# Patient Record
Sex: FEMALE | Race: WHITE | Employment: UNEMPLOYED | URBAN - NONMETROPOLITAN AREA
[De-identification: names, ages, dates, MRNs, and addresses within clinical notes are randomized per-mention and may not be internally consistent; named-entity substitution may affect disease eponyms.]

---

## 2017-11-16 DIAGNOSIS — R51.9 CHRONIC NONINTRACTABLE HEADACHE, UNSPECIFIED HEADACHE TYPE: ICD-10-CM

## 2017-11-16 DIAGNOSIS — G89.29 CHRONIC NONINTRACTABLE HEADACHE, UNSPECIFIED HEADACHE TYPE: ICD-10-CM

## 2017-11-16 NOTE — TELEPHONE ENCOUNTER
From: Susan Ellis  Sent: 11/16/2017 2:30 PM EST  Subject: Medication Renewal Request    Susan Ellis would like a refill of the following medications:  butalbital-acetaminophen-caffeine (FIORICET, ESGIC) -40 MG per tablet Yao Martinez NP]  meloxicam (MOBIC) 15 MG tablet Yao Martinez NP]    Preferred pharmacy: [facility] [address],  Emili Botello [id number] - F [phone]    Comment:

## 2017-11-20 RX ORDER — BUTALBITAL, ACETAMINOPHEN AND CAFFEINE 50; 325; 40 MG/1; MG/1; MG/1
1 TABLET ORAL EVERY 6 HOURS PRN
Qty: 30 TABLET | Refills: 3 | Status: SHIPPED | OUTPATIENT
Start: 2017-11-20 | End: 2017-11-22 | Stop reason: SDUPTHER

## 2017-11-20 RX ORDER — MELOXICAM 15 MG/1
15 TABLET ORAL DAILY
Qty: 30 TABLET | Refills: 3 | Status: SHIPPED | OUTPATIENT
Start: 2017-11-20 | End: 2017-11-22 | Stop reason: SDUPTHER

## 2017-11-22 ENCOUNTER — OFFICE VISIT (OUTPATIENT)
Dept: FAMILY MEDICINE CLINIC | Age: 62
End: 2017-11-22

## 2017-11-22 VITALS
SYSTOLIC BLOOD PRESSURE: 112 MMHG | HEART RATE: 72 BPM | DIASTOLIC BLOOD PRESSURE: 72 MMHG | HEIGHT: 65 IN | BODY MASS INDEX: 22.06 KG/M2 | WEIGHT: 132.4 LBS | TEMPERATURE: 97.5 F | OXYGEN SATURATION: 99 %

## 2017-11-22 DIAGNOSIS — G89.29 CHRONIC NONINTRACTABLE HEADACHE, UNSPECIFIED HEADACHE TYPE: Primary | ICD-10-CM

## 2017-11-22 DIAGNOSIS — Z12.39 BREAST CANCER SCREENING: ICD-10-CM

## 2017-11-22 DIAGNOSIS — Z12.11 COLON CANCER SCREENING: ICD-10-CM

## 2017-11-22 DIAGNOSIS — R51.9 CHRONIC NONINTRACTABLE HEADACHE, UNSPECIFIED HEADACHE TYPE: Primary | ICD-10-CM

## 2017-11-22 PROCEDURE — 99213 OFFICE O/P EST LOW 20 MIN: CPT | Performed by: NURSE PRACTITIONER

## 2017-11-22 RX ORDER — BUTALBITAL, ACETAMINOPHEN AND CAFFEINE 50; 325; 40 MG/1; MG/1; MG/1
1 TABLET ORAL EVERY 6 HOURS PRN
Qty: 30 TABLET | Refills: 3 | Status: SHIPPED | OUTPATIENT
Start: 2017-11-22 | End: 2019-05-02 | Stop reason: SDUPTHER

## 2017-11-22 RX ORDER — MELOXICAM 15 MG/1
15 TABLET ORAL DAILY
Qty: 30 TABLET | Refills: 3 | Status: SHIPPED | OUTPATIENT
Start: 2017-11-22 | End: 2021-10-21 | Stop reason: CLARIF

## 2017-11-22 ASSESSMENT — ENCOUNTER SYMPTOMS: SHORTNESS OF BREATH: 0

## 2017-11-22 NOTE — PROGRESS NOTES
Subjective  Chief Complaint   Patient presents with    Annual Exam     LOV 4/2016    Headache    Health Maintenance     mammogram and flu declined. HPI   Pt here for her annual exam    Still gets headaches from time to time  Current medication regimen is working well  Has jaw pain, feels like it's stiff and pops  Her dentist is aware but her dental insurance doesn't cover TMJ    There are no active problems to display for this patient. Past Medical History:   Diagnosis Date    Osteoarthritis     neck     Past Surgical History:   Procedure Laterality Date    BREAST CYST EXCISION      in pt's 20's     Family History   Problem Relation Age of Onset    Diabetes Father      Social History     Social History    Marital status:      Spouse name: N/A    Number of children: N/A    Years of education: N/A     Social History Main Topics    Smoking status: Never Smoker    Smokeless tobacco: Never Used    Alcohol use No    Drug use: No    Sexual activity: Not Asked     Other Topics Concern    None     Social History Narrative    None     Current Outpatient Prescriptions on File Prior to Visit   Medication Sig Dispense Refill    cetirizine (ZYRTEC ALLERGY) 10 MG tablet Take 1 tablet by mouth daily 30 tablet 3    fluticasone (FLONASE) 50 MCG/ACT nasal spray 1 spray by Nasal route daily 1 Bottle 3    olopatadine (PATADAY) 0.2 % SOLN ophthalmic solution 1 drop as needed       No current facility-administered medications on file prior to visit. Allergies   Allergen Reactions    Pcn [Penicillins] Swelling and Rash       Review of Systems   Constitutional: Negative for activity change, fatigue and fever. Respiratory: Negative for shortness of breath. Cardiovascular: Negative for chest pain and leg swelling. Neurological: Positive for headaches. Negative for dizziness, syncope and light-headedness.        Objective  Vitals:    11/22/17 1254   BP: 112/72   Site: Left Arm   Position: Sitting rationale and result expectations have been discussed with the patient who expresses understanding and desires to proceed. Close follow up to evaluate treatment results and for coordination of care. I have reviewed the patient's medical history in detail and updated the computerized patient record. As always, patient is advised that if symptoms worsen in any way they will proceed to the nearest emergency room. Return in about 1 year (around 11/22/2018).     Margot Perez NP

## 2017-11-29 ENCOUNTER — HOSPITAL ENCOUNTER (OUTPATIENT)
Dept: WOMENS IMAGING | Age: 62
Discharge: HOME | End: 2017-11-29
Payer: COMMERCIAL

## 2017-11-29 DIAGNOSIS — Z12.39 BREAST CANCER SCREENING: ICD-10-CM

## 2017-11-29 PROCEDURE — 77063 BREAST TOMOSYNTHESIS BI: CPT

## 2017-12-11 DIAGNOSIS — Z12.11 COLON CANCER SCREENING: ICD-10-CM

## 2017-12-11 LAB — HEMOCCULT STL QL: NORMAL

## 2019-05-02 ENCOUNTER — OFFICE VISIT (OUTPATIENT)
Dept: FAMILY MEDICINE CLINIC | Age: 64
End: 2019-05-02
Payer: COMMERCIAL

## 2019-05-02 VITALS
HEART RATE: 76 BPM | SYSTOLIC BLOOD PRESSURE: 104 MMHG | OXYGEN SATURATION: 98 % | DIASTOLIC BLOOD PRESSURE: 60 MMHG | BODY MASS INDEX: 22.73 KG/M2 | HEIGHT: 65 IN | TEMPERATURE: 96.8 F | WEIGHT: 136.4 LBS

## 2019-05-02 DIAGNOSIS — Z13.220 LIPID SCREENING: ICD-10-CM

## 2019-05-02 DIAGNOSIS — Z12.11 COLON CANCER SCREENING: ICD-10-CM

## 2019-05-02 DIAGNOSIS — R51.9 CHRONIC NONINTRACTABLE HEADACHE, UNSPECIFIED HEADACHE TYPE: Primary | ICD-10-CM

## 2019-05-02 DIAGNOSIS — G89.29 CHRONIC NONINTRACTABLE HEADACHE, UNSPECIFIED HEADACHE TYPE: Primary | ICD-10-CM

## 2019-05-02 LAB
CHOLESTEROL, TOTAL: 236 MG/DL (ref 0–199)
HDLC SERPL-MCNC: 59 MG/DL (ref 40–59)
LDL CHOLESTEROL CALCULATED: 152 MG/DL (ref 0–129)
TRIGL SERPL-MCNC: 126 MG/DL (ref 0–150)

## 2019-05-02 PROCEDURE — 99213 OFFICE O/P EST LOW 20 MIN: CPT | Performed by: NURSE PRACTITIONER

## 2019-05-02 RX ORDER — MELOXICAM 15 MG/1
15 TABLET ORAL DAILY
Qty: 30 TABLET | Refills: 3 | Status: CANCELLED | OUTPATIENT
Start: 2019-05-02

## 2019-05-02 RX ORDER — CYCLOBENZAPRINE HCL 5 MG
5 TABLET ORAL NIGHTLY PRN
Qty: 30 TABLET | Refills: 1 | Status: SHIPPED | OUTPATIENT
Start: 2019-05-02 | End: 2019-05-12

## 2019-05-02 RX ORDER — BUTALBITAL, ACETAMINOPHEN AND CAFFEINE 50; 325; 40 MG/1; MG/1; MG/1
1 TABLET ORAL EVERY 6 HOURS PRN
Qty: 30 TABLET | Refills: 3 | Status: SHIPPED | OUTPATIENT
Start: 2019-05-02 | End: 2020-10-22 | Stop reason: SDUPTHER

## 2019-05-02 RX ORDER — CELECOXIB 200 MG/1
200 CAPSULE ORAL DAILY
Qty: 30 CAPSULE | Refills: 3 | Status: SHIPPED | OUTPATIENT
Start: 2019-05-02 | End: 2020-10-22 | Stop reason: SDUPTHER

## 2019-05-02 ASSESSMENT — PATIENT HEALTH QUESTIONNAIRE - PHQ9
1. LITTLE INTEREST OR PLEASURE IN DOING THINGS: 0
2. FEELING DOWN, DEPRESSED OR HOPELESS: 0
SUM OF ALL RESPONSES TO PHQ QUESTIONS 1-9: 0
SUM OF ALL RESPONSES TO PHQ9 QUESTIONS 1 & 2: 0
SUM OF ALL RESPONSES TO PHQ QUESTIONS 1-9: 0

## 2019-05-02 ASSESSMENT — ENCOUNTER SYMPTOMS
BLURRED VISION: 0
PHOTOPHOBIA: 1
VOMITING: 0
NAUSEA: 1

## 2019-05-02 NOTE — PROGRESS NOTES
None     Attends [employer]an service: None     Active member of club or organization: None     Attends meetings of clubs or organizations: None     Relationship status: None    Intimate partner violence:     Fear of current or ex partner: None     Emotionally abused: None     Physically abused: None     Forced sexual activity: None   Other Topics Concern    None   Social History Narrative    None     Current Outpatient Medications on File Prior to Visit   Medication Sig Dispense Refill    Tetrahydrozoline-Zn Sulfate (EYE DROPS ALLERGY RELIEF OP) Apply to eye as needed      meloxicam (MOBIC) 15 MG tablet Take 1 tablet by mouth daily 30 tablet 3    cetirizine (ZYRTEC ALLERGY) 10 MG tablet Take 1 tablet by mouth daily 30 tablet 3    fluticasone (FLONASE) 50 MCG/ACT nasal spray 1 spray by Nasal route daily 1 Bottle 3     No current facility-administered medications on file prior to visit. Allergies   Allergen Reactions    Pcn [Penicillins] Swelling and Rash       Review of Systems   Eyes: Positive for photophobia. Negative for blurred vision and visual disturbance. Gastrointestinal: Positive for nausea. Negative for vomiting. Neurological: Positive for headaches. Negative for light-headedness. Objective  Vitals:    05/02/19 0918   BP: 104/60   Pulse: 76   Temp: 96.8 °F (36 °C)   SpO2: 98%   Weight: 136 lb 6.4 oz (61.9 kg)   Height: 5' 5\" (1.651 m)     Physical Exam   Constitutional: She is oriented to person, place, and time. She appears well-developed and well-nourished. HENT:   Head: Normocephalic. Eyes: Pupils are equal, round, and reactive to light. Conjunctivae and EOM are normal.   Neck: Neck supple. No thyromegaly present. Cardiovascular: Normal rate, regular rhythm, normal heart sounds and intact distal pulses. Pulmonary/Chest: Effort normal and breath sounds normal.   Lymphadenopathy:     She has no cervical adenopathy.    Neurological: She is alert and oriented to person, place, and time.   Psychiatric: She has a normal mood and affect. Her behavior is normal. Judgment and thought content normal.   Nursing note and vitals reviewed. Assessment & Plan     Diagnosis Orders   1. Chronic nonintractable headache, unspecified headache type  butalbital-acetaminophen-caffeine (FIORICET, ESGIC) -40 MG per tablet    cyclobenzaprine (FLEXERIL) 5 MG tablet    celecoxib (CELEBREX) 200 MG capsule   2. Lipid screening  Lipid Panel   3. Colon cancer screening  COLOGUARD       Orders Placed This Encounter   Procedures    COLOGUARD     This test is performed by an external laboratory and is used for result attachment only. Please fill out the appropriate paperwork required by the processing laboratory. See [email or web address] for further information. Standing Status:   Future     Standing Expiration Date:   5/2/2020    Lipid Panel     Standing Status:   Future     Standing Expiration Date:   5/2/2020     Order Specific Question:   Is Patient Fasting?/# of Hours     Answer:   12       Orders Placed This Encounter   Medications    butalbital-acetaminophen-caffeine (FIORICET, ESGIC) -40 MG per tablet     Sig: Take 1 tablet by mouth every 6 hours as needed for Headaches     Dispense:  30 tablet     Refill:  3    cyclobenzaprine (FLEXERIL) 5 MG tablet     Sig: Take 1 tablet by mouth nightly as needed for Muscle spasms     Dispense:  30 tablet     Refill:  1    celecoxib (CELEBREX) 200 MG capsule     Sig: Take 1 capsule by mouth daily     Dispense:  30 capsule     Refill:  3     Side effects, adverse effects of the medication prescribed today, as well as treatment plan/ rationale and result expectations have been discussed with the patient who expresses understanding and desires to proceed. Close follow up to evaluate treatment results and for coordination of care. I have reviewed the patient's medical history in detail and updated the computerized patient record.     As always, patient is

## 2019-05-23 ENCOUNTER — TELEPHONE (OUTPATIENT)
Dept: FAMILY MEDICINE CLINIC | Age: 64
End: 2019-05-23

## 2019-05-23 DIAGNOSIS — Z12.11 COLON CANCER SCREENING: ICD-10-CM

## 2019-06-03 ENCOUNTER — OFFICE VISIT (OUTPATIENT)
Dept: FAMILY MEDICINE CLINIC | Age: 64
End: 2019-06-03
Payer: COMMERCIAL

## 2019-06-03 VITALS
SYSTOLIC BLOOD PRESSURE: 120 MMHG | HEART RATE: 74 BPM | HEIGHT: 65 IN | WEIGHT: 137.2 LBS | TEMPERATURE: 97 F | OXYGEN SATURATION: 99 % | DIASTOLIC BLOOD PRESSURE: 74 MMHG | BODY MASS INDEX: 22.86 KG/M2

## 2019-06-03 DIAGNOSIS — G89.29 CHRONIC NONINTRACTABLE HEADACHE, UNSPECIFIED HEADACHE TYPE: Primary | ICD-10-CM

## 2019-06-03 DIAGNOSIS — R51.9 CHRONIC NONINTRACTABLE HEADACHE, UNSPECIFIED HEADACHE TYPE: Primary | ICD-10-CM

## 2019-06-03 PROCEDURE — 99212 OFFICE O/P EST SF 10 MIN: CPT | Performed by: NURSE PRACTITIONER

## 2019-06-03 ASSESSMENT — ENCOUNTER SYMPTOMS
COUGH: 0
SHORTNESS OF BREATH: 0

## 2019-06-03 NOTE — PROGRESS NOTES
Subjective  Chief Complaint   Patient presents with    1 Month Follow-Up    Medication Check       HPI     Pt here for a fu of headaches. Has only taken pills-fioricet 3 times since last time she was here. Has found that falling asleep on her back tends to trigger headaches. Feels like it got worse when going to the dentist.     There are no active problems to display for this patient.     Past Medical History:   Diagnosis Date    Osteoarthritis     neck     Past Surgical History:   Procedure Laterality Date    BREAST CYST EXCISION      in pt's 20's     Family History   Problem Relation Age of Onset    Diabetes Father      Social History     Socioeconomic History    Marital status:      Spouse name: None    Number of children: None    Years of education: None    Highest education level: None   Occupational History    None   Social Needs    Financial resource strain: None    Food insecurity:     Worry: None     Inability: None    Transportation needs:     Medical: None     Non-medical: None   Tobacco Use    Smoking status: Never Smoker    Smokeless tobacco: Never Used   Substance and Sexual Activity    Alcohol use: No    Drug use: No    Sexual activity: None   Lifestyle    Physical activity:     Days per week: None     Minutes per session: None    Stress: None   Relationships    Social connections:     Talks on phone: None     Gets together: None     Attends Denominational service: None     Active member of club or organization: None     Attends meetings of clubs or organizations: None     Relationship status: None    Intimate partner violence:     Fear of current or ex partner: None     Emotionally abused: None     Physically abused: None     Forced sexual activity: None   Other Topics Concern    None   Social History Narrative    None     Current Outpatient Medications on File Prior to Visit   Medication Sig Dispense Refill    Tetrahydrozoline-Zn Sulfate (EYE DROPS ALLERGY RELIEF OP) Apply to eye as needed      butalbital-acetaminophen-caffeine (FIORICET, ESGIC) -40 MG per tablet Take 1 tablet by mouth every 6 hours as needed for Headaches 30 tablet 3    celecoxib (CELEBREX) 200 MG capsule Take 1 capsule by mouth daily 30 capsule 3    meloxicam (MOBIC) 15 MG tablet Take 1 tablet by mouth daily 30 tablet 3    cetirizine (ZYRTEC ALLERGY) 10 MG tablet Take 1 tablet by mouth daily 30 tablet 3    fluticasone (FLONASE) 50 MCG/ACT nasal spray 1 spray by Nasal route daily 1 Bottle 3     No current facility-administered medications on file prior to visit. Allergies   Allergen Reactions    Pcn [Penicillins] Swelling and Rash       Review of Systems   Respiratory: Negative for cough and shortness of breath. Cardiovascular: Negative for chest pain. Neurological: Positive for headaches (improved). Objective  Vitals:    06/03/19 0917   BP: 120/74   Pulse: 74   Temp: 97 °F (36.1 °C)   SpO2: 99%   Weight: 137 lb 3.2 oz (62.2 kg)   Height: 5' 5\" (1.651 m)     Physical Exam   Constitutional: She is oriented to person, place, and time. She appears well-developed and well-nourished. Neurological: She is alert and oriented to person, place, and time. Psychiatric: She has a normal mood and affect. Her behavior is normal. Judgment and thought content normal.   Nursing note and vitals reviewed. Assessment & Plan     Diagnosis Orders   1. Chronic nonintractable headache, unspecified headache type       Pt is going to continue to tx headaches pormptly and try to avoid triggers. Fu prn    Side effects, adverse effects of the medication prescribed today, as well as treatment plan/ rationale and result expectations have been discussed with the patient who expresses understanding and desires to proceed. Close follow up to evaluate treatment results and for coordination of care. I have reviewed the patient's medical history in detail and updated the computerized patient record.     As always, patient is advised that if symptoms worsen in any way they will proceed to the nearest emergency room.        Anna Feliz, APRN - CNP

## 2019-06-27 ENCOUNTER — OFFICE VISIT (OUTPATIENT)
Dept: FAMILY MEDICINE CLINIC | Age: 64
End: 2019-06-27
Payer: COMMERCIAL

## 2019-06-27 VITALS
OXYGEN SATURATION: 98 % | BODY MASS INDEX: 22.76 KG/M2 | TEMPERATURE: 98.2 F | HEART RATE: 76 BPM | SYSTOLIC BLOOD PRESSURE: 118 MMHG | WEIGHT: 136.6 LBS | DIASTOLIC BLOOD PRESSURE: 74 MMHG | HEIGHT: 65 IN

## 2019-06-27 DIAGNOSIS — H66.001 NON-RECURRENT ACUTE SUPPURATIVE OTITIS MEDIA OF RIGHT EAR WITHOUT SPONTANEOUS RUPTURE OF TYMPANIC MEMBRANE: Primary | ICD-10-CM

## 2019-06-27 PROCEDURE — 99213 OFFICE O/P EST LOW 20 MIN: CPT | Performed by: NURSE PRACTITIONER

## 2019-06-27 RX ORDER — AZITHROMYCIN 250 MG/1
TABLET, FILM COATED ORAL
Qty: 6 TABLET | Refills: 0 | Status: SHIPPED | OUTPATIENT
Start: 2019-06-27 | End: 2019-07-02

## 2019-06-27 ASSESSMENT — ENCOUNTER SYMPTOMS
NAUSEA: 0
RHINORRHEA: 0
ALLERGIC/IMMUNOLOGIC NEGATIVE: 1
SHORTNESS OF BREATH: 0
SORE THROAT: 0
EYES NEGATIVE: 1
VOMITING: 0
WHEEZING: 0
BACK PAIN: 0
COUGH: 1
ABDOMINAL PAIN: 0

## 2019-06-27 NOTE — PROGRESS NOTES
Subjective  Serina Rank, 59 y.o. female presents today with:  Chief Complaint   Patient presents with    Otalgia     Right Ear Pain. x6 days Pt also has had dizziness spells some days during this and C/O feeling foggy. Pt is taking zyrtec, flonase, and motrin. Mild cough. Otalgia    There is pain in the right ear. This is a new problem. The current episode started in the past 7 days. There has been no fever. The pain is moderate (Pressure). Associated symptoms include coughing. Pertinent negatives include no abdominal pain, ear discharge, hearing loss, rhinorrhea, sore throat or vomiting. Associated symptoms comments: Has mild dizziness and right ear pressure when bending over. . She has tried nothing for the symptoms. Review of Systems   Constitutional: Negative for appetite change, fatigue, fever and unexpected weight change. HENT: Positive for ear pain. Negative for congestion, ear discharge, hearing loss, rhinorrhea and sore throat. Eyes: Negative. Respiratory: Positive for cough. Negative for shortness of breath and wheezing. Cardiovascular: Negative for chest pain. Gastrointestinal: Negative for abdominal pain, nausea and vomiting. Endocrine: Negative. Genitourinary: Negative for difficulty urinating, dysuria, menstrual problem, pelvic pain, vaginal bleeding, vaginal discharge and vaginal pain. Musculoskeletal: Negative for back pain. Skin: Negative. Allergic/Immunologic: Negative. Neurological: Negative. Hematological: Negative. Psychiatric/Behavioral: Negative.         Past Medical History:   Diagnosis Date    Osteoarthritis     neck     Past Surgical History:   Procedure Laterality Date    BREAST CYST EXCISION      in pt's 20's     Social History     Socioeconomic History    Marital status:      Spouse name: Not on file    Number of children: Not on file    Years of education: Not on file    Highest education level: Not on file Occupational History    Not on file   Social Needs    Financial resource strain: Not on file    Food insecurity:     Worry: Not on file     Inability: Not on file    Transportation needs:     Medical: Not on file     Non-medical: Not on file   Tobacco Use    Smoking status: Never Smoker    Smokeless tobacco: Never Used   Substance and Sexual Activity    Alcohol use: No    Drug use: No    Sexual activity: Not on file   Lifestyle    Physical activity:     Days per week: Not on file     Minutes per session: Not on file    Stress: Not on file   Relationships    Social connections:     Talks on phone: Not on file     Gets together: Not on file     Attends Taoism service: Not on file     Active member of club or organization: Not on file     Attends meetings of clubs or organizations: Not on file     Relationship status: Not on file    Intimate partner violence:     Fear of current or ex partner: Not on file     Emotionally abused: Not on file     Physically abused: Not on file     Forced sexual activity: Not on file   Other Topics Concern    Not on file   Social History Narrative    Not on file     Family History   Problem Relation Age of Onset    Diabetes Father      Allergies   Allergen Reactions    Pcn [Penicillins] Swelling and Rash     Current Outpatient Medications   Medication Sig Dispense Refill    azithromycin (ZITHROMAX) 250 MG tablet 500 mg on day 1, then 250 mg days 2-5. 6 tablet 0    Tetrahydrozoline-Zn Sulfate (EYE DROPS ALLERGY RELIEF OP) Apply to eye as needed      butalbital-acetaminophen-caffeine (FIORICET, ESGIC) -40 MG per tablet Take 1 tablet by mouth every 6 hours as needed for Headaches 30 tablet 3    celecoxib (CELEBREX) 200 MG capsule Take 1 capsule by mouth daily (Patient taking differently: Take 200 mg by mouth as needed ) 30 capsule 3    cetirizine (ZYRTEC ALLERGY) 10 MG tablet Take 1 tablet by mouth daily 30 tablet 3    fluticasone (FLONASE) 50 MCG/ACT nasal spray 1 spray by Nasal route daily 1 Bottle 3    meloxicam (MOBIC) 15 MG tablet Take 1 tablet by mouth daily 30 tablet 3     No current facility-administered medications for this visit. PMH, Surgical Hx, Family Hx, and Social Hxreviewed and updated. Health Maintenance reviewed. Objective    Vitals:    19 1239   BP: 118/74   Pulse: 76   Temp: 98.2 °F (36.8 °C)   SpO2: 98%   Weight: 136 lb 9.6 oz (62 kg)   Height: 5' 5\" (1.651 m)       Physical Exam   Constitutional: She is oriented to person, place, and time. She appears well-developed and well-nourished. No distress. HENT:   Right Ear: External ear normal. A middle ear effusion (TM intact) is present. Left Ear: Tympanic membrane and external ear normal.   Nose: Nose normal.   Mouth/Throat: Oropharynx is clear and moist.   Eyes: Pupils are equal, round, and reactive to light. Neck: Normal range of motion. Cardiovascular: Normal rate and regular rhythm. Pulmonary/Chest: Effort normal and breath sounds normal. No respiratory distress. She has no wheezes. She has no rales. Abdominal: Soft. Bowel sounds are normal. She exhibits no distension and no mass. There is no tenderness. There is no rebound and no guarding. No hernia. Musculoskeletal: Normal range of motion. Neurological: She is alert and oriented to person, place, and time. Skin: Skin is warm and dry. Capillary refill takes less than 2 seconds. Psychiatric: She has a normal mood and affect. Nursing note and vitals reviewed. Assessment & Plan    Diagnosis Orders   1. Non-recurrent acute suppurative otitis media of right ear without spontaneous rupture of tympanic membrane       No orders of the defined types were placed in this encounter. Orders Placed This Encounter   Medications    azithromycin (ZITHROMAX) 250 MG tablet     Si mg on day 1, then 250 mg days 2-5. Dispense:  6 tablet     Refill:  0     There are no discontinued medications.   Return if symptoms worsen or fail to improve. Reviewed with the patient: current clinical status, medications, activities and diet. Side effects, adverse effects of the medication prescribed today, as well as treatment plan/ rationale and resultexpectations have been discussed with the patient who expresses understanding and desires to proceed. Close follow up to evaluate treatment resultsand for coordination of care. I have reviewed the patient's medical history in detail and updated the computerized patient record.     Sebas Kendall RN, NP

## 2020-10-22 RX ORDER — CELECOXIB 200 MG/1
200 CAPSULE ORAL DAILY
Status: CANCELLED | OUTPATIENT
Start: 2020-10-22

## 2020-10-22 RX ORDER — BUTALBITAL, ACETAMINOPHEN AND CAFFEINE 50; 325; 40 MG/1; MG/1; MG/1
1 TABLET ORAL EVERY 6 HOURS PRN
Qty: 30 TABLET | Refills: 0 | Status: SHIPPED | OUTPATIENT
Start: 2020-10-22 | End: 2021-03-26 | Stop reason: SDUPTHER

## 2020-10-22 RX ORDER — CELECOXIB 200 MG/1
200 CAPSULE ORAL DAILY
Qty: 30 CAPSULE | Refills: 0 | Status: SHIPPED | OUTPATIENT
Start: 2020-10-22 | End: 2021-03-26 | Stop reason: SDUPTHER

## 2020-10-27 ENCOUNTER — VIRTUAL VISIT (OUTPATIENT)
Dept: FAMILY MEDICINE CLINIC | Age: 65
End: 2020-10-27
Payer: COMMERCIAL

## 2020-10-27 PROCEDURE — 99213 OFFICE O/P EST LOW 20 MIN: CPT | Performed by: NURSE PRACTITIONER

## 2020-10-27 RX ORDER — CELECOXIB 200 MG/1
200 CAPSULE ORAL DAILY
Qty: 30 CAPSULE | Refills: 0 | Status: CANCELLED | OUTPATIENT
Start: 2020-10-27

## 2020-10-27 RX ORDER — BUTALBITAL, ACETAMINOPHEN AND CAFFEINE 50; 325; 40 MG/1; MG/1; MG/1
1 TABLET ORAL EVERY 6 HOURS PRN
Qty: 30 TABLET | Refills: 0 | Status: CANCELLED | OUTPATIENT
Start: 2020-10-27

## 2020-10-27 RX ORDER — TIZANIDINE 4 MG/1
4 TABLET ORAL NIGHTLY PRN
Qty: 30 TABLET | Refills: 2 | Status: SHIPPED | OUTPATIENT
Start: 2020-10-27 | End: 2021-10-21 | Stop reason: CLARIF

## 2020-10-27 SDOH — ECONOMIC STABILITY: TRANSPORTATION INSECURITY
IN THE PAST 12 MONTHS, HAS LACK OF TRANSPORTATION KEPT YOU FROM MEETINGS, WORK, OR FROM GETTING THINGS NEEDED FOR DAILY LIVING?: NO

## 2020-10-27 SDOH — ECONOMIC STABILITY: FOOD INSECURITY: WITHIN THE PAST 12 MONTHS, YOU WORRIED THAT YOUR FOOD WOULD RUN OUT BEFORE YOU GOT MONEY TO BUY MORE.: NEVER TRUE

## 2020-10-27 SDOH — ECONOMIC STABILITY: FOOD INSECURITY: WITHIN THE PAST 12 MONTHS, THE FOOD YOU BOUGHT JUST DIDN'T LAST AND YOU DIDN'T HAVE MONEY TO GET MORE.: NEVER TRUE

## 2020-10-27 SDOH — ECONOMIC STABILITY: INCOME INSECURITY: HOW HARD IS IT FOR YOU TO PAY FOR THE VERY BASICS LIKE FOOD, HOUSING, MEDICAL CARE, AND HEATING?: NOT HARD AT ALL

## 2020-10-27 SDOH — ECONOMIC STABILITY: TRANSPORTATION INSECURITY
IN THE PAST 12 MONTHS, HAS THE LACK OF TRANSPORTATION KEPT YOU FROM MEDICAL APPOINTMENTS OR FROM GETTING MEDICATIONS?: NO

## 2020-10-27 ASSESSMENT — ENCOUNTER SYMPTOMS
SHORTNESS OF BREATH: 0
COUGH: 0

## 2020-10-27 ASSESSMENT — PATIENT HEALTH QUESTIONNAIRE - PHQ9
SUM OF ALL RESPONSES TO PHQ9 QUESTIONS 1 & 2: 0
2. FEELING DOWN, DEPRESSED OR HOPELESS: 0
1. LITTLE INTEREST OR PLEASURE IN DOING THINGS: 0
SUM OF ALL RESPONSES TO PHQ QUESTIONS 1-9: 0

## 2020-10-27 NOTE — PROGRESS NOTES
ALLERGY) 10 MG tablet Take 1 tablet by mouth daily  Patient not taking: Reported on 10/27/2020  Lurdes Luz, APRN - CNP       Social History     Tobacco Use    Smoking status: Never Smoker    Smokeless tobacco: Never Used   Substance Use Topics    Alcohol use: No    Drug use: No        Allergies   Allergen Reactions    Pcn [Penicillins] Swelling and Rash   ,   Past Medical History:   Diagnosis Date    Osteoarthritis     neck   ,   Past Surgical History:   Procedure Laterality Date    BREAST CYST EXCISION      in pt's 20's   ,   Social History     Tobacco Use    Smoking status: Never Smoker    Smokeless tobacco: Never Used   Substance Use Topics    Alcohol use: No    Drug use: No   ,   Family History   Problem Relation Age of Onset    Diabetes Father        PHYSICAL EXAMINATION:  [ INSTRUCTIONS:  \"[x]\" Indicates a positive item  \"[]\" Indicates a negative item  -- DELETE ALL ITEMS NOT EXAMINED]  [x] Alert  [x] Oriented to person/place/time    [x] No apparent distress  [] Toxic appearing    [] Face flushed appearing [x] Sclera clear  [] Lips are cyanotic      [x] Breathing appears normal  [] Appears tachypneic      [] Rash on visible skin    [x] Cranial Nerves II-XII grossly intact    [x] Motor grossly intact in visible upper extremities    [x] Motor grossly intact in visible lower extremities    [x] Normal Mood  [] Anxious appearing    [] Depressed appearing  [] Confused appearing      [] Poor short term memory  [] Poor long term memory    [] OTHER:      Due to this being a TeleHealth encounter, evaluation of the following organ systems is limited: Vitals/Constitutional/EENT/Resp/CV/GI//MS/Neuro/Skin/Heme-Lymph-Imm. ASSESSMENT/PLAN:  1. Chronic nonintractable headache, unspecified headache type      2. Encounter for screening mammogram for malignant neoplasm of breast    - SHAQU[facility]LE DIGITAL SCREEN W OR WO CAD BILATERAL; Future    3.  Arthralgia of temporomandibular joint, unspecified laterality    - tiZANidine (ZANAFLEX) 4 MG tablet; Take 1 tablet by mouth nightly as needed (jaw pain)  Dispense: 30 tablet; Refill: 2      Side effects, adverse effects of the medication prescribed today, as well as treatment plan/ rationale and result expectations have been discussed with the patient who expresses understanding and desires to proceed. Close follow up to evaluate treatment results and for coordination of care. I have reviewed the patient's medical history in detail and updated the computerized patient record. As always, patient is advised that if symptoms worsen in any way they will proceed to the nearest emergency room. Return in about 1 year (around 10/27/2021). An  electronic signature was used to authenticate this note. --CHAU Spangler CNP on 10/27/2020 at 10:51 AM        Pursuant to the emergency declaration under the [facility] waiver authority and the [employer] and [employer] Act, this Virtual  Visit was conducted, with patient's consent, to reduce the patient's risk of exposure to COVID-19 and provide continuity of care for an established patient. Services were provided through a video synchronous discussion virtually to substitute for in-person clinic visit.

## 2020-12-09 ENCOUNTER — HOSPITAL ENCOUNTER (OUTPATIENT)
Dept: WOMENS IMAGING | Age: 65
Discharge: HOME | End: 2020-12-11
Payer: COMMERCIAL

## 2020-12-09 PROCEDURE — 77063 BREAST TOMOSYNTHESIS BI: CPT

## 2021-03-26 DIAGNOSIS — R51.9 CHRONIC NONINTRACTABLE HEADACHE, UNSPECIFIED HEADACHE TYPE: ICD-10-CM

## 2021-03-26 DIAGNOSIS — G89.29 CHRONIC NONINTRACTABLE HEADACHE, UNSPECIFIED HEADACHE TYPE: ICD-10-CM

## 2021-03-26 RX ORDER — CELECOXIB 200 MG/1
200 CAPSULE ORAL DAILY
Qty: 30 CAPSULE | Refills: 0 | Status: SHIPPED | OUTPATIENT
Start: 2021-03-26 | End: 2021-10-13 | Stop reason: SDUPTHER

## 2021-03-26 RX ORDER — BUTALBITAL, ACETAMINOPHEN AND CAFFEINE 50; 325; 40 MG/1; MG/1; MG/1
1 TABLET ORAL EVERY 6 HOURS PRN
Qty: 30 TABLET | Refills: 0 | Status: SHIPPED | OUTPATIENT
Start: 2021-03-26 | End: 2021-10-13 | Stop reason: SDUPTHER

## 2021-06-22 ENCOUNTER — OFFICE VISIT (OUTPATIENT)
Dept: FAMILY MEDICINE CLINIC | Age: 66
End: 2021-06-22
Payer: COMMERCIAL

## 2021-06-22 VITALS
DIASTOLIC BLOOD PRESSURE: 68 MMHG | HEART RATE: 77 BPM | OXYGEN SATURATION: 99 % | HEIGHT: 65 IN | BODY MASS INDEX: 19.13 KG/M2 | SYSTOLIC BLOOD PRESSURE: 122 MMHG | TEMPERATURE: 97.2 F | WEIGHT: 114.8 LBS

## 2021-06-22 DIAGNOSIS — R42 DIZZINESS: ICD-10-CM

## 2021-06-22 DIAGNOSIS — H65.01 NON-RECURRENT ACUTE SEROUS OTITIS MEDIA OF RIGHT EAR: Primary | ICD-10-CM

## 2021-06-22 DIAGNOSIS — Z20.822 EXPOSURE TO COVID-19 VIRUS: ICD-10-CM

## 2021-06-22 PROCEDURE — 99213 OFFICE O/P EST LOW 20 MIN: CPT | Performed by: NURSE PRACTITIONER

## 2021-06-22 RX ORDER — MECLIZINE HYDROCHLORIDE 25 MG/1
25 TABLET ORAL 3 TIMES DAILY PRN
Qty: 30 TABLET | Refills: 0 | Status: SHIPPED | OUTPATIENT
Start: 2021-06-22 | End: 2021-07-02

## 2021-06-22 RX ORDER — AZITHROMYCIN 250 MG/1
TABLET, FILM COATED ORAL
Qty: 6 TABLET | Refills: 0 | Status: SHIPPED | OUTPATIENT
Start: 2021-06-22 | End: 2021-10-21 | Stop reason: ALTCHOICE

## 2021-06-22 ASSESSMENT — PATIENT HEALTH QUESTIONNAIRE - PHQ9
SUM OF ALL RESPONSES TO PHQ QUESTIONS 1-9: 0
SUM OF ALL RESPONSES TO PHQ QUESTIONS 1-9: 0
2. FEELING DOWN, DEPRESSED OR HOPELESS: 0
1. LITTLE INTEREST OR PLEASURE IN DOING THINGS: 0
SUM OF ALL RESPONSES TO PHQ QUESTIONS 1-9: 0
SUM OF ALL RESPONSES TO PHQ9 QUESTIONS 1 & 2: 0

## 2021-06-22 ASSESSMENT — ENCOUNTER SYMPTOMS
SORE THROAT: 1
SHORTNESS OF BREATH: 0
CHEST TIGHTNESS: 0
TROUBLE SWALLOWING: 0
VOICE CHANGE: 0
RHINORRHEA: 0
WHEEZING: 0
SINUS PAIN: 0
NAUSEA: 1
SINUS PRESSURE: 0
DIARRHEA: 0
ABDOMINAL PAIN: 0
COUGH: 0

## 2021-06-22 NOTE — PATIENT INSTRUCTIONS
Patient Education        Middle Ear Fluid: Care Instructions  Your Care Instructions     Fluid often builds up inside the ear during a cold or allergies. Usually the fluid drains away, but sometimes a small tube in the ear, called the eustachian tube, stays blocked for months. Symptoms of fluid buildup may include:  · Popping, ringing, or a feeling of fullness or pressure in the ear. · Trouble hearing. · Balance problems and dizziness. In most cases, you can treat yourself at home. Follow-up care is a key part of your treatment and safety. Be sure to make and go to all appointments, and call your doctor if you are having problems. It's also a good idea to know your test results and keep a list of the medicines you take. How can you care for yourself at home? · In most cases, the fluid clears up within a few months without treatment. You may need more tests if the fluid does not clear up after 3 months. · If your doctor prescribed antibiotics, take them as directed. Do not stop taking them just because you feel better. You need to take the full course of antibiotics. When should you call for help? Call your doctor now or seek immediate medical care if:    · You have symptoms of infection, such as:  ? Increased pain, swelling, warmth, or redness. ? Pus draining from the area. ? A fever. Watch closely for changes in your health, and be sure to contact your doctor if:    · You notice changes in hearing.     · You do not get better as expected. Where can you learn more? Go to https://[employer] [facility] [employer] [facility]Via6. org and sign in to your [facility] account. Enter U289 in the [facility] box to learn more about \"Middle Ear Fluid: Care Instructions. \"     If you do not have an account, please click on the \"Sign Up Now\" link. Current as of: December 2, 2020               Content Version: 12.9  © 7641-2705 [employer]. Care instructions adapted under license by [facility] ([facility]).  If you have questions about a medical condition or this instruction, always ask your healthcare professional. [place] [address] any warranty or liability for your use of this information.

## 2021-06-22 NOTE — PROGRESS NOTES
Subjective  Althea Jurado, 60 y.o. female presents today with:  Chief Complaint   Patient presents with    Dizziness    Otalgia    Pharyngitis    Headache       HPI  Presents to [facility] otalgia   Right ear feels \"sore\" and \"plugged up\"  Got up yesterday morning and felt dizzy  Dizziness made her nauseous. Emesis 5 times yesterday   Today still dizzy but no emesis or dry heaving   Throat \"scratchy\" \"dry\"  Fatigued today   Head feels \"congested'  Didn't take temperature yesterday   Drinking well today     Unsure of sick contacts   Not wanting COVID-19 vaccines   Does take Zyrtec for allergies                 Past Medical History:   Diagnosis Date    Osteoarthritis     neck      Past Surgical History:   Procedure Laterality Date    BREAST CYST EXCISION      in pt's 20's     Family History   Problem Relation Age of Onset    Diabetes Father              Review of Systems   Constitutional: Positive for appetite change and fatigue. Negative for activity change, chills and diaphoresis. Fever: unsure. not taking temperature. HENT: Positive for ear pain and sore throat. Negative for congestion, ear discharge, rhinorrhea, sinus pressure, sinus pain, trouble swallowing and voice change. Respiratory: Negative for cough, chest tightness, shortness of breath and wheezing. Cardiovascular: Negative for chest pain and palpitations. Gastrointestinal: Positive for nausea. Negative for abdominal pain and diarrhea. Musculoskeletal: Negative for arthralgias and myalgias. Neurological: Positive for dizziness and headaches. Negative for weakness and light-headedness. PMH, Surgical Hx, Family Hx, and Social Hx reviewed and updated. Health Maintenance reviewed.           Objective  Vitals:    06/22/21 1049   BP: 122/68   Site: Right Upper Arm   Position: Sitting   Cuff Size: Medium Adult   Pulse: 77   Temp: 97.2 °F (36.2 °C)   TempSrc: Tympanic   SpO2: 99%   Weight: 114 lb 12.8 oz (52.1 kg)   Height: 5' 5\" (1.651 m)     BP Readings from Last 3 Encounters:   06/22/21 122/68   06/27/19 118/74   06/03/19 120/74     Wt Readings from Last 3 Encounters:   06/22/21 114 lb 12.8 oz (52.1 kg)   06/27/19 136 lb 9.6 oz (62 kg)   06/03/19 137 lb 3.2 oz (62.2 kg)           Physical Exam  Vitals reviewed. Constitutional:       General: She is not in acute distress. Appearance: She is well-developed. She is not ill-appearing or toxic-appearing. HENT:      Right Ear: Ear canal and external ear normal. Tenderness present. No drainage or swelling. A middle ear effusion is present. No foreign body. No mastoid tenderness. Tympanic membrane is retracted. Tympanic membrane is not perforated or erythematous. Left Ear: Tympanic membrane, ear canal and external ear normal.      Nose: Nose normal.      Right Sinus: No maxillary sinus tenderness or frontal sinus tenderness. Left Sinus: No maxillary sinus tenderness or frontal sinus tenderness. Mouth/Throat:      Lips: Pink. Mouth: Mucous membranes are moist.      Pharynx: Oropharynx is clear. Uvula midline. No oropharyngeal exudate, posterior oropharyngeal erythema or uvula swelling. Tonsils: No tonsillar exudate. 0 on the right. 0 on the left. Eyes:      General: Lids are normal.      Conjunctiva/sclera: Conjunctivae normal.   Cardiovascular:      Rate and Rhythm: Normal rate. Pulmonary:      Effort: Pulmonary effort is normal.      Breath sounds: Normal breath sounds and air entry. Musculoskeletal:         General: Normal range of motion. Cervical back: Normal range of motion. No rigidity. No pain with movement. Lymphadenopathy:      Head:      Right side of head: No submental, submandibular, tonsillar, preauricular or posterior auricular adenopathy. Left side of head: No submental, submandibular, tonsillar, preauricular or posterior auricular adenopathy. Cervical: No cervical adenopathy. Skin:     General: Skin is warm and dry. Dispense:  30 tablet     Refill:  0     Return if symptoms worsen or fail to improve, for follow up with PCP. Reviewed with the patient: current clinical status & medications. Side effects, adverse effects of themedication prescribed today, as well as treatment plan/rationale and result expectations have been discussed with the patient who expressed understanding. How can you care for yourself at home? · In most cases, the fluid clears up within a few months without treatment. You may need more tests if the fluid does not clear up after 3 months. · If your doctor prescribed antibiotics, take them as directed. Do not stop taking them just because you feel better. You need to take the full course of antibiotics. When should you call for help? Call your doctor now or seek immediate medical care if:    · You have symptoms of infection, such as:  ? Increased pain, swelling, warmth, or redness. ? Pus draining from the area. ? A fever. Watch closely for changes in your health, and be sure to contact your doctor if:    · You notice changes in hearing.     · You do not get better as expected. Close follow up to evaluate treatment results and for coordination of care. I have reviewed the patient's medical history in detail and updated the computerized patient record.           CHAU Jackson NP

## 2021-06-23 LAB — SARS-COV-2, PCR: NOT DETECTED

## 2021-10-13 DIAGNOSIS — R51.9 CHRONIC NONINTRACTABLE HEADACHE, UNSPECIFIED HEADACHE TYPE: ICD-10-CM

## 2021-10-13 DIAGNOSIS — G89.29 CHRONIC NONINTRACTABLE HEADACHE, UNSPECIFIED HEADACHE TYPE: ICD-10-CM

## 2021-10-14 RX ORDER — CELECOXIB 200 MG/1
200 CAPSULE ORAL DAILY
Qty: 30 CAPSULE | Refills: 0 | Status: SHIPPED | OUTPATIENT
Start: 2021-10-14 | End: 2021-10-21 | Stop reason: SDUPTHER

## 2021-10-14 RX ORDER — BUTALBITAL, ACETAMINOPHEN AND CAFFEINE 50; 325; 40 MG/1; MG/1; MG/1
1 TABLET ORAL EVERY 6 HOURS PRN
Qty: 30 TABLET | Refills: 0 | Status: SHIPPED | OUTPATIENT
Start: 2021-10-14 | End: 2021-10-21 | Stop reason: SDUPTHER

## 2021-10-14 NOTE — TELEPHONE ENCOUNTER
Appointments    This patient does not currently have any appointments scheduled.   Recent Visits    06/22/2021 Non-recurrent acute serous otitis media of right ear [facility]   CHAU Murphy NP    10/27/2020 Encounter for screening mammogram for malignant neoplasm of breast 25 June Alex, APRN - CNP    06/27/2019 Non-recurrent acute suppurative otitis media of right ear without spontaneous rupture of tympanic membrane [facility]   [place] CNP    06/03/2019 Chronic nonintractable headache, unspecified headache type 25 June Alex, CHAU - CNP      Sending [facility] appt reminder, overdue    Both last filled March

## 2021-10-21 ENCOUNTER — OFFICE VISIT (OUTPATIENT)
Dept: FAMILY MEDICINE CLINIC | Age: 66
End: 2021-10-21
Payer: MEDICARE

## 2021-10-21 VITALS
WEIGHT: 128 LBS | SYSTOLIC BLOOD PRESSURE: 112 MMHG | BODY MASS INDEX: 21.33 KG/M2 | DIASTOLIC BLOOD PRESSURE: 82 MMHG | HEART RATE: 86 BPM | OXYGEN SATURATION: 98 % | HEIGHT: 65 IN

## 2021-10-21 DIAGNOSIS — R94.31 ABNORMAL EKG: ICD-10-CM

## 2021-10-21 DIAGNOSIS — L98.9 SKIN LESION: ICD-10-CM

## 2021-10-21 DIAGNOSIS — R53.83 FATIGUE, UNSPECIFIED TYPE: ICD-10-CM

## 2021-10-21 DIAGNOSIS — R06.00 DYSPNEA, UNSPECIFIED TYPE: ICD-10-CM

## 2021-10-21 DIAGNOSIS — D64.9 ANEMIA, UNSPECIFIED TYPE: ICD-10-CM

## 2021-10-21 DIAGNOSIS — R07.9 CHEST PAIN, UNSPECIFIED TYPE: Primary | ICD-10-CM

## 2021-10-21 DIAGNOSIS — R51.9 CHRONIC NONINTRACTABLE HEADACHE, UNSPECIFIED HEADACHE TYPE: ICD-10-CM

## 2021-10-21 DIAGNOSIS — G89.29 CHRONIC NONINTRACTABLE HEADACHE, UNSPECIFIED HEADACHE TYPE: ICD-10-CM

## 2021-10-21 LAB
ALBUMIN SERPL-MCNC: 4.9 G/DL (ref 3.5–4.6)
ALP BLD-CCNC: 77 U/L (ref 40–130)
ALT SERPL-CCNC: 13 U/L (ref 0–33)
ANION GAP SERPL CALCULATED.3IONS-SCNC: 14 MEQ/L (ref 9–15)
AST SERPL-CCNC: 18 U/L (ref 0–35)
BASOPHILS ABSOLUTE: 0 K/UL (ref 0–0.2)
BASOPHILS RELATIVE PERCENT: 0.6 %
BILIRUB SERPL-MCNC: 0.4 MG/DL (ref 0.2–0.7)
BUN BLDV-MCNC: 34 MG/DL (ref 8–23)
CALCIUM SERPL-MCNC: 10.2 MG/DL (ref 8.5–9.9)
CHLORIDE BLD-SCNC: 101 MEQ/L (ref 95–107)
CO2: 26 MEQ/L (ref 20–31)
CREAT SERPL-MCNC: 0.81 MG/DL (ref 0.5–0.9)
EOSINOPHILS ABSOLUTE: 0.1 K/UL (ref 0–0.7)
EOSINOPHILS RELATIVE PERCENT: 1.8 %
GFR AFRICAN AMERICAN: >60
GFR NON-AFRICAN AMERICAN: >60
GLOBULIN: 2.1 G/DL (ref 2.3–3.5)
GLUCOSE BLD-MCNC: 86 MG/DL (ref 70–99)
HCT VFR BLD CALC: 39.3 % (ref 37–47)
HEMOGLOBIN: 13.2 G/DL (ref 12–16)
IRON SATURATION: 31 % (ref 11–46)
IRON: 99 UG/DL (ref 37–145)
LYMPHOCYTES ABSOLUTE: 1.1 K/UL (ref 1–4.8)
LYMPHOCYTES RELATIVE PERCENT: 19.8 %
MCH RBC QN AUTO: 31.8 PG (ref 27–31.3)
MCHC RBC AUTO-ENTMCNC: 33.6 % (ref 33–37)
MCV RBC AUTO: 94.6 FL (ref 82–100)
MONOCYTES ABSOLUTE: 0.4 K/UL (ref 0.2–0.8)
MONOCYTES RELATIVE PERCENT: 7.4 %
NEUTROPHILS ABSOLUTE: 3.9 K/UL (ref 1.4–6.5)
NEUTROPHILS RELATIVE PERCENT: 70.4 %
PDW BLD-RTO: 13.1 % (ref 11.5–14.5)
PLATELET # BLD: 221 K/UL (ref 130–400)
POTASSIUM SERPL-SCNC: 4.6 MEQ/L (ref 3.4–4.9)
RBC # BLD: 4.15 M/UL (ref 4.2–5.4)
SODIUM BLD-SCNC: 141 MEQ/L (ref 135–144)
TOTAL IRON BINDING CAPACITY: 317 UG/DL (ref 178–450)
TOTAL PROTEIN: 7 G/DL (ref 6.3–8)
WBC # BLD: 5.6 K/UL (ref 4.8–10.8)

## 2021-10-21 PROCEDURE — 99214 OFFICE O/P EST MOD 30 MIN: CPT | Performed by: NURSE PRACTITIONER

## 2021-10-21 PROCEDURE — 93000 ELECTROCARDIOGRAM COMPLETE: CPT | Performed by: NURSE PRACTITIONER

## 2021-10-21 RX ORDER — CELECOXIB 200 MG/1
200 CAPSULE ORAL DAILY
Qty: 30 CAPSULE | Refills: 5 | Status: SHIPPED | OUTPATIENT
Start: 2021-10-21 | End: 2022-10-26 | Stop reason: SDUPTHER

## 2021-10-21 RX ORDER — BUTALBITAL, ACETAMINOPHEN AND CAFFEINE 50; 325; 40 MG/1; MG/1; MG/1
1 TABLET ORAL EVERY 6 HOURS PRN
Qty: 30 TABLET | Refills: 0 | Status: SHIPPED | OUTPATIENT
Start: 2021-10-21 | End: 2021-12-06 | Stop reason: SDUPTHER

## 2021-10-21 ASSESSMENT — ENCOUNTER SYMPTOMS
SHORTNESS OF BREATH: 1
COUGH: 0
CONSTIPATION: 0
DIARRHEA: 0

## 2021-10-28 ENCOUNTER — TELEPHONE (OUTPATIENT)
Dept: FAMILY MEDICINE CLINIC | Age: 66
End: 2021-10-28

## 2021-10-28 ENCOUNTER — OFFICE VISIT (OUTPATIENT)
Dept: FAMILY MEDICINE CLINIC | Age: 66
End: 2021-10-28
Payer: MEDICARE

## 2021-10-28 VITALS — OXYGEN SATURATION: 98 % | HEART RATE: 82 BPM | TEMPERATURE: 96.8 F

## 2021-10-28 DIAGNOSIS — L57.0 ACTINIC KERATOSES: ICD-10-CM

## 2021-10-28 DIAGNOSIS — E75.5 XANTHOMA: Primary | ICD-10-CM

## 2021-10-28 DIAGNOSIS — L82.1 SEBORRHEIC KERATOSES: ICD-10-CM

## 2021-10-28 PROCEDURE — 99214 OFFICE O/P EST MOD 30 MIN: CPT | Performed by: FAMILY MEDICINE

## 2021-10-28 PROCEDURE — 17000 DESTRUCT PREMALG LESION: CPT | Performed by: FAMILY MEDICINE

## 2021-10-28 SDOH — ECONOMIC STABILITY: FOOD INSECURITY: WITHIN THE PAST 12 MONTHS, YOU WORRIED THAT YOUR FOOD WOULD RUN OUT BEFORE YOU GOT MONEY TO BUY MORE.: NEVER TRUE

## 2021-10-28 SDOH — ECONOMIC STABILITY: FOOD INSECURITY: WITHIN THE PAST 12 MONTHS, THE FOOD YOU BOUGHT JUST DIDN'T LAST AND YOU DIDN'T HAVE MONEY TO GET MORE.: NEVER TRUE

## 2021-10-28 ASSESSMENT — SOCIAL DETERMINANTS OF HEALTH (SDOH): HOW HARD IS IT FOR YOU TO PAY FOR THE VERY BASICS LIKE FOOD, HOUSING, MEDICAL CARE, AND HEATING?: NOT HARD AT ALL

## 2021-10-28 ASSESSMENT — ENCOUNTER SYMPTOMS: COLOR CHANGE: 1

## 2021-10-28 NOTE — PROGRESS NOTES
Diagnosis Orders   1. Xanthoma     2. Actinic keratoses  [place] DESTRUC PREMALIGNANT, FIRST LESION   3. Seborrheic keratoses         Return if symptoms worsen or fail to improve. Orders Placed This Encounter   Procedures     [facility], [address] [facility]. was seen today for skin problem. Diagnoses and all orders for this visit:    Xanthoma    Actinic keratoses  -     [place] DESTRUC PREMALIGNANT, FIRST LESION    Seborrheic keratoses        Return if symptoms worsen or fail to improve. Patient Instructions   Cryotherapy instructions    Post op instructions given. A printed copy provided. It is best to leave blisters alone if they form for the first 1-3 days to allow the desired damaged tissue (precancer lesion, wart, or whatever lesion is being removed) to separate from healthy tissue. The area should be covered with a bandage to prevent blister breakage and dirt exposure. The wounds should remain dry while there is a blister, therefore if this is a sweaty location like the foot you may need to change socks multiple times per day. When the blister(s) pop or patient removes the top as instructed between day 3-5, apply antibiotic (NOT triple antibiotic, one brand is Neosporin) ointment and a bandage to affected area(s). The ointment should be applied to the open area as long as it is not covered with skin. Exposed tissue is meant to be moist.      Once a scab is formed the patient may stop applying ointment. The scab may appear yellow while moist, don't confuse this with infection. If the wound is infection pus will drain from the site. If this treatment was for a large wart you may note that a plug of skin may fall out of the area that was treated. That is the center of the wart and it is appropriate for it to come out. If exposed skin remains, treat that area as you would a ruptured blister as mentioned above.     Bacitracin sample supplied                      Pt is her for changing lesion in her right eyebrow. Also has a few other lesions she questions    No prior history of skin cancer      Current Outpatient Medications on File Prior to Visit   Medication Sig Dispense Refill    butalbital-acetaminophen-caffeine (FIORICET, ESGIC) -40 MG per tablet Take 1 tablet by mouth every 6 hours as needed for Headaches 30 tablet 0    celecoxib (CELEBREX) 200 MG capsule Take 1 capsule by mouth daily 30 capsule 5    cetirizine (ZYRTEC ALLERGY) 10 MG tablet Take 1 tablet by mouth daily 30 tablet 3     No current facility-administered medications on file prior to visit. Pcn [penicillins]    Review of Systems   Constitutional: Negative for chills and fever. Skin: Positive for color change. Allergic/Immunologic: Negative for environmental allergies, food allergies and immunocompromised state. Hematological: Negative for adenopathy. Does not bruise/bleed easily. Psychiatric/Behavioral: Negative for behavioral problems and sleep disturbance. Pulse 82   Temp 96.8 °F (36 °C)   SpO2 98%   Physical Exam  Constitutional:       General: She is not in acute distress. Appearance: Normal appearance. She is well-developed. She is not toxic-appearing. HENT:      Head: Normocephalic and atraumatic. Right Ear: Hearing and tympanic membrane normal.      Left Ear: Hearing and tympanic membrane normal.      Nose: Nose normal. No nasal deformity. Eyes:      General: Lids are normal.         Right eye: No discharge. Left eye: No discharge. Conjunctiva/sclera: Conjunctivae normal.      Pupils: Pupils are equal, round, and reactive to light. Neck:      Thyroid: No thyroid mass or thyromegaly. Vascular: No JVD. Trachea: Trachea and phonation normal.   Cardiovascular:      Rate and Rhythm: Normal rate and regular rhythm. Pulmonary:      Effort: No accessory muscle usage or respiratory distress.    Musculoskeletal:      Cervical back: Full passive range of motion without pain. Comments: FROM all large muscle groups and joints as witnessed when walking to exam table, getting on, and getting off the exam table. Skin:     General: Skin is warm and dry. Findings: No rash. Comments: R eyebrow and L face above the L upper lip yellow/white raised smooth regular lesion    L nare 1 mm pearly vascular lesion   Neurological:      Mental Status: She is alert. Motor: No tremor or atrophy. Gait: Gait normal.   Psychiatric:         Speech: Speech normal.         Behavior: Behavior normal.         Thought Content: Thought content normal.           Procedure consent L edmundo  The procedure was discussed with the patient. All questions were answered and alternative options discussed. The patient is aware of the risks of bleeding, infection, unsatisfactory scar result. Informed consent paperwork was signed by the patient. Liquid nitrogen was applied for 2-6 seconds to affected areas with thaw allowed between dosing for a total of 2 applications. Applied to 1 lesion(s). The patient tolerated the procedure well. Diagnosis Orders   1. Xanthoma     2. Actinic keratoses  [place] DESTRUC PREMALIG[place]NT, FIRST LESION   3. Seborrheic keratoses         Return if symptoms worsen or fail to improve. Orders Placed This Encounter   Procedures     [facility], FIRST LESION               Patient Instructions   Cryotherapy instructions    Post op instructions given. A printed copy provided. It is best to leave blisters alone if they form for the first 1-3 days to allow the desired damaged tissue (precancer lesion, wart, or whatever lesion is being removed) to separate from healthy tissue. The area should be covered with a bandage to prevent blister breakage and dirt exposure. The wounds should remain dry while there is a blister, therefore if this is a sweaty location like the foot you may need to change socks multiple times per day. When the blister(s) pop or patient removes the top as instructed between day 3-5, apply antibiotic (NOT triple antibiotic, one brand is Neosporin) ointment and a bandage to affected area(s). The ointment should be applied to the open area as long as it is not covered with skin. Exposed tissue is meant to be moist.      Once a scab is formed the patient may stop applying ointment. The scab may appear yellow while moist, don't confuse this with infection. If the wound is infection pus will drain from the site. If this treatment was for a large wart you may note that a plug of skin may fall out of the area that was treated. That is the center of the wart and it is appropriate for it to come out. If exposed skin remains, treat that area as you would a ruptured blister as mentioned above. Bacitracin sample supplied                  DO NOT USE TRIPLE ANTIBIOTIC ON THE WOUND    It is best to leave blisters alone if they form. They should be covered with a bandage to prevent blister breakage and dirt exposure. The wounds should remain dry while there is a blister, therefore if this is a sweaty location like the foot you may need to change socks multiple times per day. If blister(s) pop or patient pops with a sterilized needle, apply antibiotic (NOT triple antibiotic, one brand is Neosporin) ointment and a bandage to affected area(s). The ointment should be applied to the open area as long as it is not covered with skin. Exposed tissue is meant to be moist.  Once a scab formed she may stop applying ointment. If this treatment was for a large wart you may note that a plug of skin may fall out of the area that was treated. That is the center of the wart and it is appropriate for it to come out. If exposed skin remains, treat that area as you would a ruptured blister as mentioned above.

## 2021-10-28 NOTE — TELEPHONE ENCOUNTER
Spoke with Giselle Jensen at scheduling- clarified the orders.  Giselle Jensen will call the patient to schedule the nuclear med stress test.

## 2021-10-28 NOTE — TELEPHONE ENCOUNTER
Cedrick Stokes from scheduling calling -   Ph. [phone]    Pt scheduled for a stress test for tomorrow. Nuclear medicine stress test was ordered. However Pt wanting the exercise stress test.     Pt is currently scheduled for exercise stress test.     If ok to complete the exercise one, a new order needs placed. If wanting the nuclear medicine one, scheduling will need to be called at the number listed above. Please advise.

## 2021-10-28 NOTE — PATIENT INSTRUCTIONS
Cryotherapy instructions    Post op instructions given. A printed copy provided. It is best to leave blisters alone if they form for the first 1-3 days to allow the desired damaged tissue (precancer lesion, wart, or whatever lesion is being removed) to separate from healthy tissue. The area should be covered with a bandage to prevent blister breakage and dirt exposure. The wounds should remain dry while there is a blister, therefore if this is a sweaty location like the foot you may need to change socks multiple times per day. When the blister(s) pop or patient removes the top as instructed between day 3-5, apply antibiotic (NOT triple antibiotic, one brand is Neosporin) ointment and a bandage to affected area(s). The ointment should be applied to the open area as long as it is not covered with skin. Exposed tissue is meant to be moist.      Once a scab is formed the patient may stop applying ointment. The scab may appear yellow while moist, don't confuse this with infection. If the wound is infection pus will drain from the site. If this treatment was for a large wart you may note that a plug of skin may fall out of the area that was treated. That is the center of the wart and it is appropriate for it to come out. If exposed skin remains, treat that area as you would a ruptured blister as mentioned above.     Bacitracin sample supplied

## 2021-11-10 ENCOUNTER — HOSPITAL ENCOUNTER (OUTPATIENT)
Dept: NUCLEAR MEDICINE | Age: 66
Discharge: HOME | End: 2021-11-12
Payer: MEDICARE

## 2021-11-10 ENCOUNTER — HOSPITAL ENCOUNTER (OUTPATIENT)
Dept: NON INVASIVE DIAGNOSTICS | Age: 66
Discharge: HOME | End: 2021-11-10
Payer: MEDICARE

## 2021-11-10 DIAGNOSIS — R06.00 DYSPNEA, UNSPECIFIED TYPE: ICD-10-CM

## 2021-11-10 DIAGNOSIS — R94.31 ABNORMAL EKG: ICD-10-CM

## 2021-11-10 DIAGNOSIS — R07.9 CHEST PAIN, UNSPECIFIED TYPE: ICD-10-CM

## 2021-11-10 LAB
LV EF: 69 %
LVEF MODALITY: NORMAL

## 2021-11-10 PROCEDURE — 78452 HT MUSCLE IMAGE SPECT MULT: CPT

## 2021-11-10 PROCEDURE — 78452 HT MUSCLE IMAGE SPECT MULT: CPT | Performed by: INTERNAL MEDICINE

## 2021-11-10 PROCEDURE — 2580000003 HC RX 258: Performed by: NURSE PRACTITIONER

## 2021-11-10 PROCEDURE — A9502 TC99M TETROFOSMIN: HCPCS | Performed by: NURSE PRACTITIONER

## 2021-11-10 PROCEDURE — 93017 CV STRESS TEST TRACING ONLY: CPT

## 2021-11-10 PROCEDURE — 3430000000 HC RX DIAGNOSTIC RADIOPHARMACEUTICAL: Performed by: NURSE PRACTITIONER

## 2021-11-10 RX ORDER — SODIUM CHLORIDE 0.9 % (FLUSH) 0.9 %
10 SYRINGE (ML) INJECTION PRN
Status: COMPLETED | OUTPATIENT
Start: 2021-11-10 | End: 2021-11-10

## 2021-11-10 RX ADMIN — SODIUM CHLORIDE, PRESERVATIVE FREE 10 ML: 5 INJECTION INTRAVENOUS at 10:09

## 2021-11-10 RX ADMIN — TETROFOSMIN 32 MILLICURIE: 1.38 INJECTION, POWDER, LYOPHILIZED, FOR SOLUTION INTRAVENOUS at 10:05

## 2021-11-10 RX ADMIN — SODIUM CHLORIDE, PRESERVATIVE FREE 10 ML: 5 INJECTION INTRAVENOUS at 08:54

## 2021-11-10 RX ADMIN — SODIUM CHLORIDE, PRESERVATIVE FREE 10 ML: 5 INJECTION INTRAVENOUS at 10:10

## 2021-11-10 RX ADMIN — TETROFOSMIN 11.8 MILLICURIE: 1.38 INJECTION, POWDER, LYOPHILIZED, FOR SOLUTION INTRAVENOUS at 08:54

## 2021-11-10 NOTE — PROGRESS NOTES
Hx,allergies and medications reviewed. Patient took no medications prior to testing. [address] [place] [address] here. Injected patient with isotope and Myoview. Tolerated procedure fairly well for 3:15 minutes. Reached 97 % target HR. SOB reported. Returned to baseline in recovery. Denied chest pain or pressure. EKG shows no noted ectopy.

## 2021-11-11 ENCOUNTER — TELEPHONE (OUTPATIENT)
Dept: FAMILY MEDICINE CLINIC | Age: 66
End: 2021-11-11

## 2021-12-06 ENCOUNTER — OFFICE VISIT (OUTPATIENT)
Dept: FAMILY MEDICINE CLINIC | Age: 66
End: 2021-12-06
Payer: MEDICARE

## 2021-12-06 VITALS
WEIGHT: 131 LBS | DIASTOLIC BLOOD PRESSURE: 72 MMHG | OXYGEN SATURATION: 99 % | BODY MASS INDEX: 21.83 KG/M2 | HEART RATE: 68 BPM | HEIGHT: 65 IN | SYSTOLIC BLOOD PRESSURE: 122 MMHG

## 2021-12-06 DIAGNOSIS — R53.81 PHYSICAL DECONDITIONING: Primary | ICD-10-CM

## 2021-12-06 DIAGNOSIS — G89.29 CHRONIC NONINTRACTABLE HEADACHE, UNSPECIFIED HEADACHE TYPE: ICD-10-CM

## 2021-12-06 DIAGNOSIS — R51.9 CHRONIC NONINTRACTABLE HEADACHE, UNSPECIFIED HEADACHE TYPE: ICD-10-CM

## 2021-12-06 PROCEDURE — 99213 OFFICE O/P EST LOW 20 MIN: CPT | Performed by: NURSE PRACTITIONER

## 2021-12-06 RX ORDER — BUTALBITAL, ACETAMINOPHEN AND CAFFEINE 50; 325; 40 MG/1; MG/1; MG/1
1 TABLET ORAL EVERY 6 HOURS PRN
Qty: 30 TABLET | Refills: 2 | Status: SHIPPED | OUTPATIENT
Start: 2021-12-06 | End: 2022-10-26 | Stop reason: SDUPTHER

## 2021-12-06 ASSESSMENT — ENCOUNTER SYMPTOMS
CHEST TIGHTNESS: 0
SHORTNESS OF BREATH: 0

## 2021-12-06 NOTE — PROGRESS NOTES
Subjective  Chief Complaint   Patient presents with   [facility] Maintenance     pt declined flu, dexa    Follow-up     discuss imaging results       HPI     Here to discuss stress test.     Overall results were normal with exception of deconditioning. Pt states that she feels like she works herself up before physical exercise  Admits to not participating in much physical activity     There are no problems to display for this patient. Past Medical History:   Diagnosis Date    Osteoarthritis     neck     Past Surgical History:   Procedure Laterality Date    BREAST CYST EXCISION      in pt's 20's     Family History   Problem Relation Age of Onset    Diabetes Father      Social History     Socioeconomic History    Marital status:      Spouse name: None    Number of children: None    Years of education: None    Highest education level: None   Occupational History    None   Tobacco Use    Smoking status: Never Smoker    Smokeless tobacco: Never Used   Substance and Sexual Activity    Alcohol use: No    Drug use: No    Sexual activity: None   Other Topics Concern    None   Social History Narrative    None     Social Determinants of Health     Financial Resource Strain: Low Risk     Difficulty of Paying Living Expenses: Not hard at all   Food Insecurity: No Food Insecurity    Worried About Running Out of Food in the Last Year: Never true    [address] in the Last Year: Never true   Transportation Needs:     Lack of Transportation (Medical): Not on file    Lack of Transportation (Non-Medical):  Not on file   Physical Activity:     Days of Exercise per Week: Not on file    Minutes of Exercise per Session: Not on file   Stress:     Feeling of Stress : Not on file   Social Connections:     Frequency of Communication with Friends and Family: Not on file    Frequency of Social Gatherings with Friends and Family: Not on file    Attends Mu-ism Services: Not on file   [employer] of Clubs or Organizations: Not on file    Attends Club or Organization Meetings: Not on file    Marital Status: Not on file   Intimate Partner Violence:     Fear of Current or Ex-Partner: Not on file    Emotionally Abused: Not on file    Physically Abused: Not on file    Sexually Abused: Not on file   Housing Stability:     Unable to Pay for Housing in the Last Year: Not on file    Number of [place] in the Last Year: Not on file    Unstable Housing in the Last Year: Not on file     Current Outpatient Medications on File Prior to Visit   Medication Sig Dispense Refill    celecoxib (CELEBREX) 200 MG capsule Take 1 capsule by mouth daily 30 capsule 5    cetirizine (ZYRTEC ALLERGY) 10 MG tablet Take 1 tablet by mouth daily 30 tablet 3     No current facility-administered medications on file prior to visit. Allergies   Allergen Reactions    Pcn [Penicillins] Swelling and Rash       Review of Systems   Constitutional: Negative. HENT: Negative. Respiratory: Negative for chest tightness and shortness of breath. Cardiovascular: Negative for chest pain and palpitations. Musculoskeletal:        Bilateral knee pain    Neurological: Negative. Psychiatric/Behavioral: The patient is nervous/anxious. Objective  Vitals:    12/06/21 1546   BP: 122/72   Pulse: 68   SpO2: 99%   Weight: 131 lb (59.4 kg)   Height: 5' 5\" (1.651 m)     Physical Exam  Vitals and nursing note reviewed. Exam conducted with a chaperone present. Constitutional:       Appearance: Normal appearance. She is normal weight. HENT:      Head: Normocephalic. Eyes:      Pupils: Pupils are equal, round, and reactive to light. Cardiovascular:      Rate and Rhythm: Normal rate and regular rhythm. Pulses: Normal pulses. Heart sounds: Normal heart sounds. Pulmonary:      Effort: Pulmonary effort is normal.      Breath sounds: Normal breath sounds. Abdominal:      General: Abdomen is flat.    Skin:     General: Skin

## 2021-12-29 ENCOUNTER — HOSPITAL ENCOUNTER (INPATIENT)
Age: 66
LOS: 2 days | Discharge: HOME | DRG: 100 | End: 2021-12-31
Attending: INTERNAL MEDICINE | Admitting: INTERNAL MEDICINE
Payer: MEDICARE

## 2021-12-29 ENCOUNTER — APPOINTMENT (OUTPATIENT)
Dept: CT IMAGING | Age: 66
DRG: 100 | End: 2021-12-29
Payer: MEDICARE

## 2021-12-29 ENCOUNTER — APPOINTMENT (OUTPATIENT)
Dept: GENERAL RADIOLOGY | Age: 66
DRG: 100 | End: 2021-12-29
Payer: MEDICARE

## 2021-12-29 ENCOUNTER — APPOINTMENT (OUTPATIENT)
Dept: MRI IMAGING | Age: 66
DRG: 100 | End: 2021-12-29
Payer: MEDICARE

## 2021-12-29 DIAGNOSIS — R56.9 SEIZURE (HCC): Primary | ICD-10-CM

## 2021-12-29 DIAGNOSIS — U07.1 COVID-19: ICD-10-CM

## 2021-12-29 LAB
ALBUMIN SERPL-MCNC: 3.8 G/DL (ref 3.5–4.6)
ALP BLD-CCNC: 85 U/L (ref 40–130)
ALT SERPL-CCNC: 17 U/L (ref 0–33)
ANION GAP SERPL CALCULATED.3IONS-SCNC: 11 MEQ/L (ref 9–15)
APTT: 38.2 SEC (ref 24.4–36.8)
AST SERPL-CCNC: 25 U/L (ref 0–35)
BASOPHILS ABSOLUTE: 0 K/UL (ref 0–0.2)
BASOPHILS RELATIVE PERCENT: 0.1 %
BILIRUB SERPL-MCNC: 0.4 MG/DL (ref 0.2–0.7)
BILIRUBIN URINE: NEGATIVE
BLOOD, URINE: NEGATIVE
BUN BLDV-MCNC: 20 MG/DL (ref 8–23)
CALCIUM SERPL-MCNC: 9.2 MG/DL (ref 8.5–9.9)
CHLORIDE BLD-SCNC: 101 MEQ/L (ref 95–107)
CHP ED QC CHECK: NORMAL
CLARITY: CLEAR
CO2: 25 MEQ/L (ref 20–31)
COLOR: YELLOW
CREAT SERPL-MCNC: 1.01 MG/DL (ref 0.5–0.9)
D DIMER: <0.27 MG/L FEU (ref 0–0.5)
EKG ATRIAL RATE: 70 BPM
EKG P AXIS: 74 DEGREES
EKG P-R INTERVAL: 152 MS
EKG Q-T INTERVAL: 406 MS
EKG QRS DURATION: 78 MS
EKG QTC CALCULATION (BAZETT): 438 MS
EKG R AXIS: 40 DEGREES
EKG T AXIS: 38 DEGREES
EKG VENTRICULAR RATE: 70 BPM
EOSINOPHILS ABSOLUTE: 0 K/UL (ref 0–0.7)
EOSINOPHILS RELATIVE PERCENT: 0.1 %
GFR AFRICAN AMERICAN: >60
GFR AFRICAN AMERICAN: >60
GFR NON-AFRICAN AMERICAN: 54.9
GFR NON-AFRICAN AMERICAN: 56
GLOBULIN: 3.3 G/DL (ref 2.3–3.5)
GLUCOSE BLD-MCNC: 113 MG/DL
GLUCOSE BLD-MCNC: 113 MG/DL (ref 60–115)
GLUCOSE BLD-MCNC: 135 MG/DL (ref 70–99)
GLUCOSE URINE: NEGATIVE MG/DL
HCT VFR BLD CALC: 35.1 % (ref 37–47)
HEMOGLOBIN: 11.7 G/DL (ref 12–16)
INFLUENZA A BY PCR: NEGATIVE
INFLUENZA B BY PCR: NEGATIVE
INR BLD: 1
KETONES, URINE: NEGATIVE MG/DL
LACTIC ACID: 1.2 MMOL/L (ref 0.5–2.2)
LEUKOCYTE ESTERASE, URINE: NEGATIVE
LYMPHOCYTES ABSOLUTE: 0.7 K/UL (ref 1–4.8)
LYMPHOCYTES RELATIVE PERCENT: 12 %
MAGNESIUM: 2.4 MG/DL (ref 1.7–2.4)
MCH RBC QN AUTO: 30.5 PG (ref 27–31.3)
MCHC RBC AUTO-ENTMCNC: 33.3 % (ref 33–37)
MCV RBC AUTO: 91.7 FL (ref 82–100)
MONOCYTES ABSOLUTE: 0.4 K/UL (ref 0.2–0.8)
MONOCYTES RELATIVE PERCENT: 7.4 %
NEUTROPHILS ABSOLUTE: 4.5 K/UL (ref 1.4–6.5)
NEUTROPHILS RELATIVE PERCENT: 80.4 %
NITRITE, URINE: NEGATIVE
PDW BLD-RTO: 13.2 % (ref 11.5–14.5)
PERFORMED ON: ABNORMAL
PERFORMED ON: NORMAL
PH UA: 6 (ref 5–9)
PLATELET # BLD: 172 K/UL (ref 130–400)
POC CREATININE WHOLE BLOOD: 1
POC CREATININE: 1 MG/DL (ref 0.6–1.2)
POC SAMPLE TYPE: ABNORMAL
POTASSIUM SERPL-SCNC: 3.9 MEQ/L (ref 3.4–4.9)
PRO-BNP: 54 PG/ML
PROCALCITONIN: 0.07 NG/ML (ref 0–0.15)
PROLACTIN: 30.9 NG/ML
PROTEIN UA: NEGATIVE MG/DL
PROTHROMBIN TIME: 13.2 SEC (ref 12.3–14.9)
RBC # BLD: 3.83 M/UL (ref 4.2–5.4)
SARS-COV-2, NAAT: DETECTED
SODIUM BLD-SCNC: 137 MEQ/L (ref 135–144)
SPECIFIC GRAVITY UA: 1.02 (ref 1–1.03)
TOTAL CK: 65 U/L (ref 0–170)
TOTAL PROTEIN: 7.1 G/DL (ref 6.3–8)
TROPONIN: <0.01 NG/ML (ref 0–0.01)
URINE REFLEX TO CULTURE: NORMAL
UROBILINOGEN, URINE: 0.2 E.U./DL
WBC # BLD: 5.5 K/UL (ref 4.8–10.8)

## 2021-12-29 PROCEDURE — G0378 HOSPITAL OBSERVATION PER HR: HCPCS

## 2021-12-29 PROCEDURE — 6360000004 HC RX CONTRAST MEDICATION: Performed by: STUDENT IN AN ORGANIZED HEALTH CARE EDUCATION/TRAINING PROGRAM

## 2021-12-29 PROCEDURE — 85025 COMPLETE CBC W/AUTO DIFF WBC: CPT

## 2021-12-29 PROCEDURE — 2580000003 HC RX 258: Performed by: PSYCHIATRY & NEUROLOGY

## 2021-12-29 PROCEDURE — 2580000003 HC RX 258

## 2021-12-29 PROCEDURE — 96365 THER/PROPH/DIAG IV INF INIT: CPT

## 2021-12-29 PROCEDURE — 93005 ELECTROCARDIOGRAM TRACING: CPT | Performed by: STUDENT IN AN ORGANIZED HEALTH CARE EDUCATION/TRAINING PROGRAM

## 2021-12-29 PROCEDURE — 70553 MRI BRAIN STEM W/O & W/DYE: CPT

## 2021-12-29 PROCEDURE — 36415 COLL VENOUS BLD VENIPUNCTURE: CPT

## 2021-12-29 PROCEDURE — 6360000002 HC RX W HCPCS: Performed by: STUDENT IN AN ORGANIZED HEALTH CARE EDUCATION/TRAINING PROGRAM

## 2021-12-29 PROCEDURE — 85730 THROMBOPLASTIN TIME PARTIAL: CPT

## 2021-12-29 PROCEDURE — 6830039000 HC L3 TRAUMA ALERT

## 2021-12-29 PROCEDURE — 2580000003 HC RX 258: Performed by: STUDENT IN AN ORGANIZED HEALTH CARE EDUCATION/TRAINING PROGRAM

## 2021-12-29 PROCEDURE — 96376 TX/PRO/DX INJ SAME DRUG ADON: CPT

## 2021-12-29 PROCEDURE — 6370000000 HC RX 637 (ALT 250 FOR IP): Performed by: INTERNAL MEDICINE

## 2021-12-29 PROCEDURE — 87502 INFLUENZA DNA AMP PROBE: CPT

## 2021-12-29 PROCEDURE — 83880 ASSAY OF NATRIURETIC PEPTIDE: CPT

## 2021-12-29 PROCEDURE — 6360000002 HC RX W HCPCS: Performed by: PSYCHIATRY & NEUROLOGY

## 2021-12-29 PROCEDURE — 70450 CT HEAD/BRAIN W/O DYE: CPT

## 2021-12-29 PROCEDURE — 99291 CRITICAL CARE FIRST HOUR: CPT | Performed by: PSYCHIATRY & NEUROLOGY

## 2021-12-29 PROCEDURE — 80053 COMPREHEN METABOLIC PANEL: CPT

## 2021-12-29 PROCEDURE — A9579 GAD-BASE MR CONTRAST NOS,1ML: HCPCS | Performed by: PSYCHIATRY & NEUROLOGY

## 2021-12-29 PROCEDURE — 71275 CT ANGIOGRAPHY CHEST: CPT

## 2021-12-29 PROCEDURE — 83735 ASSAY OF MAGNESIUM: CPT

## 2021-12-29 PROCEDURE — 87635 SARS-COV-2 COVID-19 AMP PRB: CPT

## 2021-12-29 PROCEDURE — 99285 EMERGENCY DEPT VISIT HI MDM: CPT

## 2021-12-29 PROCEDURE — 6360000002 HC RX W HCPCS: Performed by: INTERNAL MEDICINE

## 2021-12-29 PROCEDURE — 82550 ASSAY OF CK (CPK): CPT

## 2021-12-29 PROCEDURE — 85379 FIBRIN DEGRADATION QUANT: CPT

## 2021-12-29 PROCEDURE — 1210000000 HC MED SURG R&B

## 2021-12-29 PROCEDURE — 6360000004 HC RX CONTRAST MEDICATION: Performed by: PSYCHIATRY & NEUROLOGY

## 2021-12-29 PROCEDURE — 84146 ASSAY OF PROLACTIN: CPT

## 2021-12-29 PROCEDURE — 84484 ASSAY OF TROPONIN QUANT: CPT

## 2021-12-29 PROCEDURE — 81003 URINALYSIS AUTO W/O SCOPE: CPT

## 2021-12-29 PROCEDURE — 96361 HYDRATE IV INFUSION ADD-ON: CPT

## 2021-12-29 PROCEDURE — 93010 ELECTROCARDIOGRAM REPORT: CPT | Performed by: INTERNAL MEDICINE

## 2021-12-29 PROCEDURE — 83605 ASSAY OF LACTIC ACID: CPT

## 2021-12-29 PROCEDURE — 96372 THER/PROPH/DIAG INJ SC/IM: CPT

## 2021-12-29 PROCEDURE — 84145 PROCALCITONIN (PCT): CPT

## 2021-12-29 PROCEDURE — 85610 PROTHROMBIN TIME: CPT

## 2021-12-29 PROCEDURE — 87040 BLOOD CULTURE FOR BACTERIA: CPT

## 2021-12-29 PROCEDURE — 72125 CT NECK SPINE W/O DYE: CPT

## 2021-12-29 RX ORDER — LORAZEPAM 2 MG/ML
1 INJECTION INTRAMUSCULAR
Status: ACTIVE | OUTPATIENT
Start: 2021-12-29 | End: 2021-12-29

## 2021-12-29 RX ORDER — SODIUM CHLORIDE 0.9 % (FLUSH) 0.9 %
5-40 SYRINGE (ML) INJECTION EVERY 12 HOURS SCHEDULED
Status: DISCONTINUED | OUTPATIENT
Start: 2021-12-29 | End: 2021-12-31 | Stop reason: HOSPADM

## 2021-12-29 RX ORDER — ONDANSETRON 4 MG/1
4 TABLET, ORALLY DISINTEGRATING ORAL EVERY 8 HOURS PRN
Status: DISCONTINUED | OUTPATIENT
Start: 2021-12-29 | End: 2021-12-31 | Stop reason: HOSPADM

## 2021-12-29 RX ORDER — ACETAMINOPHEN 325 MG/1
650 TABLET ORAL EVERY 6 HOURS PRN
Status: DISCONTINUED | OUTPATIENT
Start: 2021-12-29 | End: 2021-12-31 | Stop reason: HOSPADM

## 2021-12-29 RX ORDER — 0.9 % SODIUM CHLORIDE 0.9 %
1000 INTRAVENOUS SOLUTION INTRAVENOUS ONCE
Status: COMPLETED | OUTPATIENT
Start: 2021-12-29 | End: 2021-12-29

## 2021-12-29 RX ORDER — SODIUM CHLORIDE 9 MG/ML
25 INJECTION, SOLUTION INTRAVENOUS PRN
Status: DISCONTINUED | OUTPATIENT
Start: 2021-12-29 | End: 2021-12-31 | Stop reason: HOSPADM

## 2021-12-29 RX ORDER — SODIUM CHLORIDE 0.9 % (FLUSH) 0.9 %
5-40 SYRINGE (ML) INJECTION PRN
Status: DISCONTINUED | OUTPATIENT
Start: 2021-12-29 | End: 2021-12-31 | Stop reason: HOSPADM

## 2021-12-29 RX ORDER — ACETAMINOPHEN 650 MG/1
650 SUPPOSITORY RECTAL EVERY 6 HOURS PRN
Status: DISCONTINUED | OUTPATIENT
Start: 2021-12-29 | End: 2021-12-31 | Stop reason: HOSPADM

## 2021-12-29 RX ORDER — POLYETHYLENE GLYCOL 3350 17 G/17G
17 POWDER, FOR SOLUTION ORAL DAILY PRN
Status: DISCONTINUED | OUTPATIENT
Start: 2021-12-29 | End: 2021-12-31 | Stop reason: HOSPADM

## 2021-12-29 RX ORDER — LORAZEPAM 2 MG/ML
1 INJECTION INTRAMUSCULAR EVERY 5 MIN PRN
Status: DISCONTINUED | OUTPATIENT
Start: 2021-12-29 | End: 2021-12-31 | Stop reason: HOSPADM

## 2021-12-29 RX ORDER — ONDANSETRON 2 MG/ML
4 INJECTION INTRAMUSCULAR; INTRAVENOUS EVERY 6 HOURS PRN
Status: DISCONTINUED | OUTPATIENT
Start: 2021-12-29 | End: 2021-12-31 | Stop reason: HOSPADM

## 2021-12-29 RX ORDER — GUAIFENESIN 600 MG/1
600 TABLET, EXTENDED RELEASE ORAL 2 TIMES DAILY
Status: DISCONTINUED | OUTPATIENT
Start: 2021-12-29 | End: 2021-12-31 | Stop reason: HOSPADM

## 2021-12-29 RX ORDER — SODIUM CHLORIDE 0.9 % (FLUSH) 0.9 %
10 SYRINGE (ML) INJECTION PRN
Status: DISCONTINUED | OUTPATIENT
Start: 2021-12-29 | End: 2021-12-31 | Stop reason: HOSPADM

## 2021-12-29 RX ORDER — SODIUM CHLORIDE 0.9 % (FLUSH) 0.9 %
10 SYRINGE (ML) INJECTION 2 TIMES DAILY
Status: DISCONTINUED | OUTPATIENT
Start: 2021-12-29 | End: 2021-12-31 | Stop reason: HOSPADM

## 2021-12-29 RX ADMIN — LEVETIRACETAM 1000 MG: 100 INJECTION INTRAVENOUS at 06:29

## 2021-12-29 RX ADMIN — GUAIFENESIN 600 MG: 600 TABLET ORAL at 20:52

## 2021-12-29 RX ADMIN — Medication 10 ML: at 20:49

## 2021-12-29 RX ADMIN — LEVETIRACETAM 750 MG: 100 INJECTION INTRAVENOUS at 13:26

## 2021-12-29 RX ADMIN — IOPAMIDOL 100 ML: 612 INJECTION, SOLUTION INTRAVENOUS at 06:49

## 2021-12-29 RX ADMIN — SODIUM CHLORIDE 1000 ML: 9 INJECTION, SOLUTION INTRAVENOUS at 06:31

## 2021-12-29 RX ADMIN — SODIUM CHLORIDE 1000 ML: 9 INJECTION, SOLUTION INTRAVENOUS at 09:08

## 2021-12-29 RX ADMIN — ENOXAPARIN SODIUM 40 MG: 100 INJECTION SUBCUTANEOUS at 12:04

## 2021-12-29 RX ADMIN — GADOTERIDOL 15 ML: 279.3 INJECTION, SOLUTION INTRAVENOUS at 14:19

## 2021-12-29 ASSESSMENT — ENCOUNTER SYMPTOMS
BACK PAIN: 0
DIARRHEA: 1
WHEEZING: 0
TROUBLE SWALLOWING: 0
SHORTNESS OF BREATH: 1
VOMITING: 0
SHORTNESS OF BREATH: 0
COLOR CHANGE: 0
CHOKING: 0
CONSTIPATION: 0
ABDOMINAL PAIN: 0
COUGH: 1
PHOTOPHOBIA: 0
ABDOMINAL DISTENTION: 0
NAUSEA: 0

## 2021-12-29 ASSESSMENT — PAIN DESCRIPTION - LOCATION: LOCATION: NECK

## 2021-12-29 ASSESSMENT — PAIN DESCRIPTION - PAIN TYPE: TYPE: ACUTE PAIN

## 2021-12-29 ASSESSMENT — PAIN SCALES - GENERAL: PAINLEVEL_OUTOF10: 5

## 2021-12-29 NOTE — ED NOTES
Patient resting in bed with lights on and call light within reach. Equal rise and fall of chest. No distress noted. Nurse will continue to monitor.       Jefferson Malagon RN  12/29/21 [id number]

## 2021-12-29 NOTE — ED NOTES
With patient permission, updated patients  on POC. All questions answered.       Miryam An RN  12/29/21 [id number]

## 2021-12-29 NOTE — ED NOTES
Patient medicated per MAR. All questions answered and allergies reviewed. Will continue to monitor. MRI tech at bedside.      Jong Goss RN  12/29/21 [id number]

## 2021-12-29 NOTE — ED NOTES
Pt provided with lunch tray. Will medicate patient once med is received from [facility].       Darrian Valladares RN  12/29/21 0491 Fany Dumont RN  12/29/21 9345

## 2021-12-29 NOTE — ED NOTES
Patient asleep in room. Equal chest rise and fall. No distress noted. Will continue to monitor.       Gladys Vallecillo RN  12/29/21 [id number]

## 2021-12-29 NOTE — ED NOTES
Patient resting in bed with lights on and call light within reach. Equal rise and fall of chest. No distress noted. Nurse will continue to monitor.       Kandy Motta RN  12/29/21 6036

## 2021-12-29 NOTE — ED NOTES
Pt requesting Mucinex. Physician perfect served at [phone].       Jefferson Malagon, RN  12/29/21 9054

## 2021-12-29 NOTE — ED PROVIDER NOTES
[address] [facility] ED  EMERGENCY DEPARTMENT ENCOUNTER      Pt Name: Luna Elliott  MRN: [id number]  [place] 1955  Date of evaluation: 12/29/2021  Provider: DIMPLE Brown    CHIEF COMPLAINT     No chief complaint on file. HISTORY OF PRESENT ILLNESS   (Location/Symptom, Timing/Onset, Context/Setting, Quality, Duration, Modifying Factors, Severity)  Note limiting factors. Luna Elliott is a 73 y.o. female who per chart review has pmhx of OA presents to the emergency department for evaluation of possible seizure-like activity. Patient has been sick with flulike symptoms since last Tuesday (1 week). She has had dry cough, fatigue, poor appetite, diarrhea, chest pain with coughing only, chills, sweating. Has not been able to eat or drink for about 3-4 days. Taking Mucinex at home.  has been sick with similar sx, had a positive home test. Pt has not been tested for covid yet. Was not vaccinated. They have been sleeping in separate beds,  heard her get up to use the bathroom tonight and then a large thud. Found her in the bathroom with one had draped over the skin and rest of the body on the floor. He rolled her over and she had \"glossed over\" eyes and was not responding with facial grimacing, states she was totally limp. This lasted about 1 minute. She was a bit confused after, at baseline now. She did hit the back of her head. No thinners. No hx of CVA or seizures. In triage, triage nurse said she turned her head to the right was not responding and left leg appeared to be stiff, only lasting about 30 seconds. She states she has felt very lightheaded since being sick, cannot stand up without feeling lightheaded. She does not remember the episode but knows it happened after she stood up from the toilet. She reports neck pain since the fall.  She denies current chest pain, sob, abd pain,  Nausea vomiting, back pain, leg swelling, hemoptysis, headache, dizziness, visual changes, numbness, tingling, weakness, facial droop, urinary sx. HPI    Nursing Notes were reviewed. REVIEW OF SYSTEMS    (2-9 systems for level 4, 10 or more for level 5)     Review of Systems   Constitutional: Positive for activity change, appetite change, chills, diaphoresis and fatigue. Negative for fever. HENT: Negative for congestion. Eyes: Negative for photophobia. Respiratory: Positive for cough. Negative for shortness of breath and wheezing. Cardiovascular: Positive for chest pain (with coughing only). Negative for palpitations and leg swelling. Gastrointestinal: Positive for diarrhea. Negative for abdominal distention, abdominal pain, constipation, nausea and vomiting. Genitourinary: Negative for dysuria, frequency and hematuria. Musculoskeletal: Positive for neck pain. Negative for myalgias. Skin: Negative for rash. Allergic/Immunologic: Negative for immunocompromised state. Neurological: Positive for seizures, syncope and light-headedness. Negative for dizziness, facial asymmetry, weakness, numbness and headaches. All other systems reviewed and are negative. Except as noted above the remainder of the review of systems was reviewed and negative.        PAST MEDICAL HISTORY     Past Medical History:   Diagnosis Date    Osteoarthritis     neck    Seizure ([facility] [address] 12/29/2021         SURGICAL HISTORY       Past Surgical History:   Procedure Laterality Date    BREAST CYST EXCISION      in pt's 20's         CURRENT MEDICATIONS       Previous Medications    BUTALBITAL-ACETAMINOPHEN-CAFFEINE (FIORICET, ESGIC) -40 MG PER TABLET    Take 1 tablet by mouth every 6 hours as needed for Headaches    CELECOXIB (CELEBREX) 200 MG CAPSULE    Take 1 capsule by mouth daily    CETIRIZINE (ZYRTEC ALLERGY) 10 MG TABLET    Take 1 tablet by mouth daily       ALLERGIES     Pcn [penicillins]    FAMILY HISTORY       Family History   Problem Relation Age of Onset    Diabetes Father           SOCIAL Response: Obeys commands  Kelsie Coma Scale Score: 15                     [facility] Assessment  BP: 90/64  Pulse: 79                 PHYSICAL EXAM    (up to 7 for level 4, 8 or more for level 5)     ED Triage Vitals   BP Temp Temp src Pulse Resp SpO2 Height Weight   -- -- -- -- -- -- -- --       Physical Exam  Constitutional:       General: She is not in acute distress. Appearance: She is well-developed. She is ill-appearing. She is not toxic-appearing or diaphoretic. Comments: pale   HENT:      Head: Normocephalic and atraumatic. Nose: Nose normal.      Mouth/Throat:      Mouth: Mucous membranes are moist.   Eyes:      Pupils: Pupils are equal, round, and reactive to light. Cardiovascular:      Rate and Rhythm: Normal rate and regular rhythm. Heart sounds: No murmur heard. No friction rub. No gallop. Pulmonary:      Effort: Pulmonary effort is normal.      Breath sounds: Rales (bilateral lower lobes) present. No wheezing or rhonchi. Abdominal:      General: Bowel sounds are normal. There is no distension. Palpations: Abdomen is soft. Tenderness: There is no abdominal tenderness. There is no guarding or rebound. Musculoskeletal:         General: No swelling. Cervical back: Normal range of motion. Tenderness present. No edema, deformity, erythema or crepitus. No pain with movement. Thoracic back: Normal.      Lumbar back: Normal.      Left lower leg: No edema. Comments: c-collar in place    Skin:     General: Skin is warm and dry. Capillary Refill: Capillary refill takes less than 2 seconds. Findings: No rash. Neurological:      General: No focal deficit present. Mental Status: She is alert and oriented to person, place, and time. GCS: GCS eye subscore is 4. GCS verbal subscore is 5. GCS motor subscore is 6. Cranial Nerves: Cranial nerves are intact. Sensory: Sensation is intact.       Motor: Weakness (generalized, no focal deficit) present. Coordination: Coordination is intact. Deep Tendon Reflexes: Reflexes are normal and symmetric. DIAGNOSTIC RESULTS     EKG: All EKG's are interpreted by the Emergency Department Physician who either signs or Co-signs this chart in the absence of a cardiologist.    EKG shows NSR with HR 70, normal axis, normal intervals, no ST changes.         RADIOLOGY:   Non-plain film images such as CT, Ultrasound and MRI are read by the radiologist. Plain radiographic images are visualized and preliminarily interpreted by the emergency physician with the below findings:      Interpretation per the Radiologist below, if available at the time of this note:    CTA Chest W WO  (PE study)    (Results Pending)   CT HEAD WO CONTRAST    (Results Pending)   CT CERVICAL SPINE WO CONTRAST    (Results Pending)         ED BEDSIDE ULTRASOUND:   Performed by ED Physician - none    LABS:  Labs Reviewed   COVID-19, RAPID - Abnormal; Notable for the following components:       Result Value    SARS-CoV-2, NAAT DETECTED (*)     All other components within normal limits    Narrative:     CALL  Scales  [facility] tel. [phone],   COMPREHENSIVE METABOLIC PANEL - Abnormal; Notable for the following components:    Glucose 135 (*)     CREATININE 1.01 (*)     GFR Non- 54.9 (*)     All other components within normal limits   CBC WITH AUTO DIFFERENTIAL - Abnormal; Notable for the following components:    RBC 3.83 (*)     Hemoglobin 11.7 (*)     Hematocrit 35.1 (*)     Lymphocytes Absolute 0.7 (*)     All other components within normal limits   APTT - Abnormal; Notable for the following components:    aPTT 38.2 (*)     All other components within normal limits   POCT GLUCOSE - Normal   POCT CREATININE - URINE - Normal   RAPID INFLUENZA A/B ANTIGENS   CULTURE, BLOOD 1   CULTURE, BLOOD 2   BRAIN NATRIURETIC PEPTIDE   LACTIC ACID, PLASMA   MAGNESIUM   TROPONIN   URINE RT REFLEX TO CULTURE   PROTIME-INR   PROCALCITONIN PROLACTIN   D-DIMER, QUANTITATIVE   POCT GLUCOSE       All other labs were within normal range or not returned as of this dictation. EMERGENCY DEPARTMENT COURSE and DIFFERENTIAL DIAGNOSIS/MDM:   Vitals:    Vitals:    12/29/21 0630 12/29/21 0845 12/29/21 0900 12/29/21 1100   BP: (!) 100/57 (!) 96/58 95/68 90/64   Pulse: 74 74 101 79   Resp: 16 14 14 16   Temp:       TempSrc:       SpO2: 99% 94% 95% 99%   Weight:       Height:             MDM       73year old female patient presents to ED for seizure-like activity. Reported to have 1 episode of 1 minute seizure at home. Episode described as body going limp with patient being \"glossed over. \"  No noted tonic-clonic activity. She fell and hit her head at this time, no thinners, presents with neck pain. Noted to have similar episode in ED triage with staring off and stiff left leg. No history of seizure like activity. Denies recollection of both events. Patient continues to complain of lightheadedness as only symptom. No focal neurologic symptoms. Given 1g Keppra in ED. Patient has also had FLS for the past 8 days is covid positive in ED. No respiratory complaints or hypoxia. Given 2L IVF states she has not eaten/drank much over past several days due to illness. Afebrile hemodynamically stable. Lactic normal. CT cervical spine negative for acute fracture or subluxation, notes moderate degenerative disc disease worst at C6/7. CT head negative for acute process, notable for age related cerebral volume loss. CTA chest remarkable for mild bilateral pneumonia, negative for acute embolus or vascular changes. With new onset seizure activity will admit Dr. Lisbeth Grimm hospitalist accepts. Page placed to Dr. Marcos Watkins who will see on consult. REASSESSMENT          CRITICAL CARE TIME   Total Critical Care time was 0 minutes, excluding separately reportable procedures.   There was a high probability of clinically significant/life threatening deterioration in the patient's condition which required my urgent intervention. CONSULTS:  IP CONSULT TO NEUROLOGY  IP CONSULT TO NEUROLOGY    PROCEDURES:  Unless otherwise noted below, none     Procedures        FINAL IMPRESSION      1. Seizure ([address]    2. COVID-19          DISPOSITION/PLAN   DISPOSITION        PATIENT REFERRED TO:  No follow-up provider specified. DISCHARGE MEDICATIONS:  New Prescriptions    No medications on file     Controlled Substances Monitoring:     No flowsheet data found.     (Please note that portions of this note were completed with a voice recognition program.  Efforts were made to edit the dictations but occasionally words are mis-transcribed.)    DIMPLE Caputo (electronically signed)             DIMPLE Caputo  12/29/21 1106

## 2021-12-29 NOTE — ED NOTES
Patient resting in bed with lights on and call light within reach. Equal rise and fall of chest. No distress noted. Nurse will continue to monitor.       Chandrika García RN  12/29/21 7507

## 2021-12-29 NOTE — ED NOTES
Patient resting in bed with lights on and call light within reach. Equal rise and fall of chest. No distress noted. Nurse will continue to monitor.       Mirta Valladares RN  12/29/21 3229

## 2021-12-29 NOTE — H&P
[facility]  MEDICINE    HISTORY AND PHYSICAL EXAM    PATIENT NAME:  Marylin Powers    MRN:  [id number]  SERVICE DATE:  12/29/2021   SERVICE TIME:  10:22 AM    Primary Care Physician: CHAU Blood CNP     SUBJECTIVE  CHIEF COMPLAINT:  Syncopal episode, seizure like activity. HPI:  Marylin Powers is a 73 y.o., , female who presents with complaint of Syncopal episode, seizure like activity. PMH significant for  has a past medical history of Osteoarthritis and Seizure ([facility] [address] .      Patient is a 60-year-old female who presented to ED after syncopal episode at home during which she fell and struck her head. There is associated seizure-like activity both at home and during triage in the ED. Patient has no known history of seizures. Only pertinent past medical history is osteoarthritis. Evaluation in the ED found that she was newly Covid positive. Spouse has been Covid positive recently. Neither patient nor spouse are vaccinated. Patient had no hypoxia since presentation, but still feels lightheaded and generalized weak. CT head neck in ED was negative for hemorrhagic CVA or acute fracture. CT angiogram was negative for PE, but did demonstrate diffuse bilateral changes consistent with Covid viral pneumonia. Neurology service is being consulted by ED. Hospitalist service is consulted for admission. Patient was loaded with 1000 mg Keppra in ED, which will be continued pending further neurology recommendations. Patient is a full code. On examination, patient complains only of persistent lightheadedness and generalized nonfocal weakness. She denies any shortness of breath, chest pain, focal numbness/tingling, confusion, memory problems, disorientation, or other new/acute complaint other than previously noted.        PAST MEDICAL HISTORY:    Past Medical History:   Diagnosis Date    Osteoarthritis     neck    Seizure ([facility] [address] 12/29/2021     PAST SURGICAL HISTORY:    Past Surgical History:   Procedure Laterality Date    BREAST CYST EXCISION      in pt's 20's     FAMILY HISTORY:    Family History   Problem Relation Age of Onset    Diabetes Father      SOCIAL HISTORY:    Social History     Socioeconomic History    Marital status:      Spouse name: Not on file    Number of children: Not on file    Years of education: Not on file    Highest education level: Not on file   Occupational History    Not on file   Tobacco Use    Smoking status: Never Smoker    Smokeless tobacco: Never Used   Substance and Sexual Activity    Alcohol use: No    Drug use: No    Sexual activity: Not on file   Other Topics Concern    Not on file   Social History Narrative    Not on file     Social Determinants of Health     Financial Resource Strain: Low Risk     Difficulty of Paying Living Expenses: Not hard at all   Food Insecurity: No Food Insecurity    Worried About [address] in the Last Year: Never true    [address] [place] in the Last Year: Never true   Transportation Needs:     Lack of Transportation (Medical): Not on file    Lack of Transportation (Non-Medical):  Not on file   Physical Activity:     Days of Exercise per Week: Not on file    Minutes of Exercise per Session: Not on file   Stress:     Feeling of Stress : Not on file   Social Connections:     Frequency of Communication with Friends and Family: Not on file    Frequency of Social Gatherings with Friends and Family: Not on file    Attends Temple Services: Not on file    Active Member of Clubs or Organizations: Not on file    Attends Club or Organization Meetings: Not on file    Marital Status: Not on file   Intimate Partner Violence:     Fear of Current or Ex-Partner: Not on file    Emotionally Abused: Not on file    Physically Abused: Not on file    Sexually Abused: Not on file   Housing Stability:     Unable to Pay for Housing in the Last Year: Not on file    Number of [place] in the Last Year: Not on file    Unstable Housing in the Last Year: Not on file     MEDICATIONS:   Prior to Admission medications    Medication Sig Start Date End Date Taking? Authorizing Provider   butalbital-acetaminophen-caffeine (FIORICET, ESGIC) -89 MG per tablet Take 1 tablet by mouth every 6 hours as needed for Headaches 12/6/21 3/6/22  CHAU Rivero Mai, CNP   celecoxib (CELEBREX) 200 MG capsule Take 1 capsule by mouth daily 10/21/21   CHAU Rivero Mai, CNP   cetirizine (ZYRTEC ALLERGY) 10 MG tablet Take 1 tablet by mouth daily 4/14/16   CHAU Rivero Mai, CNP       ALLERGIES: Pcn [penicillins] - Rash    REVIEW OF SYSTEM:   A full 12 point review of systems was completed, and was unremarkable except as specified above. OBJECTIVE    BP 95/68   Pulse 101   Temp 97.9 °F (36.6 °C) (Oral)   Resp 14   Ht 5' 5\" (1.651 m)   Wt 131 lb (59.4 kg)   SpO2 95%   BMI 21.80 kg/m²     PHYSICAL EXAM:   Constitutional: Elderly adult female reclining in ED bed no acute distress. Spouse present at bedside, who reports also having been positive recently. Head: No obvious trauma apparent. No bleeding. ENT: Hearing grossly intact. No rhinorrhea. No obvious anterior oropharyngeal lesions. Neck: Trachea midline, phonation normal  Cardiovascular: RRR, no significant murmurs appreciated. Warm and well perfused. No pretibial edema. Pulmonary: Normal rate and effort of respiration on room air. Faint diffuse bilateral fine Velcro crackles. No coughing during exam.  Abdomen: Flat, soft, nontense abdomen. Hypoactive bowel sounds. Nontender to palpation. Neurologic: Alert, grossly oriented. Mild generalized +4/5 weakness, nonfocal.  CN II through XII grossly intact. Psychiatric: Pleasant, calm, cooperative. DATA:     Diagnostic tests reviewed for today's visit:    Most recent labs and imaging results reviewed.      LABS:    Recent Results (from the past 24 hour(s))   EKG 12 Lead - Chest Pain Collection Time: 12/29/21  5:28 AM   Result Value Ref Range    Ventricular Rate 70 BPM    Atrial Rate 70 BPM    P-R Interval 152 ms    QRS Duration 78 ms    Q-T Interval 406 ms    QTc Calculation (Bazett) 438 ms    P Axis 74 degrees    R Axis 40 degrees    T Axis 38 degrees   Brain Natriuretic Peptide    Collection Time: 12/29/21  5:45 AM   Result Value Ref Range    Pro-BNP 54 pg/mL   Comprehensive Metabolic Panel    Collection Time: 12/29/21  5:45 AM   Result Value Ref Range    Sodium 137 135 - 144 mEq/L    Potassium 3.9 3.4 - 4.9 mEq/L    Chloride 101 95 - 107 mEq/L    CO2 25 20 - 31 mEq/L    Anion Gap 11 9 - 15 mEq/L    Glucose 135 (H) 70 - 99 mg/dL    BUN 20 8 - 23 mg/dL    CREATININE 1.01 (H) 0.50 - 0.90 mg/dL    GFR Non-African American 54.9 (L) >60    GFR  >60.0 >60    Calcium 9.2 8.5 - 9.9 mg/dL    Total Protein 7.1 6.3 - 8.0 g/dL    Albumin 3.8 3.5 - 4.6 g/dL    Total Bilirubin 0.4 0.2 - 0.7 mg/dL    Alkaline Phosphatase 85 40 - 130 U/L    ALT 17 0 - 33 U/L    AST 25 0 - 35 U/L    Globulin 3.3 2.3 - 3.5 g/dL   CBC Auto Differential    Collection Time: 12/29/21  5:45 AM   Result Value Ref Range    WBC 5.5 4.8 - 10.8 K/uL    RBC 3.83 (L) 4.20 - 5.40 M/uL    Hemoglobin 11.7 (L) 12.0 - 16.0 g/dL    Hematocrit 35.1 (L) 37.0 - 47.0 %    MCV 91.7 82.0 - 100.0 fL    MCH 30.5 27.0 - 31.3 pg    MCHC 33.3 33.0 - 37.0 %    RDW 13.2 11.5 - 14.5 %    Platelets 796 913 - 734 K/uL    Neutrophils % 80.4 %    Lymphocytes % 12.0 %    Monocytes % 7.4 %    Eosinophils % 0.1 %    Basophils % 0.1 %    Neutrophils Absolute 4.5 1.4 - 6.5 K/uL    Lymphocytes Absolute 0.7 (L) 1.0 - 4.8 K/uL    Monocytes Absolute 0.4 0.2 - 0.8 K/uL    Eosinophils Absolute 0.0 0.0 - 0.7 K/uL    Basophils Absolute 0.0 0.0 - 0.2 K/uL   Lactic Acid, Plasma    Collection Time: 12/29/21  5:45 AM   Result Value Ref Range    Lactic Acid 1.2 0.5 - 2.2 mmol/L   Magnesium    Collection Time: 12/29/21  5:45 AM   Result Value Ref Range    Magnesium 2. 4 1.7 - 2.4 mg/dL   Troponin    Collection Time: 12/29/21  5:45 AM   Result Value Ref Range    Troponin <0.010 0.000 - 0.010 ng/mL   Urine Reflex to Culture    Collection Time: 12/29/21  5:45 AM    Specimen: Urine, clean catch   Result Value Ref Range    Color, UA Yellow Straw/Yellow    Clarity, UA Clear Clear    Glucose, Ur Negative Negative mg/dL    Bilirubin Urine Negative Negative    Ketones, Urine Negative Negative mg/dL    Specific Gravity, UA 1.023 1.005 - 1.030    Blood, Urine Negative Negative    pH, UA 6.0 5.0 - 9.0    Protein, UA Negative Negative mg/dL    Urobilinogen, Urine 0.2 <2.0 E.U./dL    Nitrite, Urine Negative Negative    Leukocyte Esterase, Urine Negative Negative    Urine Reflex to Culture Not Indicated    APTT    Collection Time: 12/29/21  5:45 AM   Result Value Ref Range    aPTT 38.2 (H) 24.4 - 36.8 sec   Protime-INR    Collection Time: 12/29/21  5:45 AM   Result Value Ref Range    Protime 13.2 12.3 - 14.9 sec    INR 1.0    PROCALCITONIN    Collection Time: 12/29/21  5:45 AM   Result Value Ref Range    Procalcitonin 0.07 0.00 - 0.15 ng/mL   COVID-19, Rapid    Collection Time: 12/29/21  6:08 AM    Specimen: Nasopharyngeal Swab   Result Value Ref Range    SARS-CoV-2, NAAT DETECTED (A) Not Detected   Rapid Influenza A/B Antigens    Collection Time: 12/29/21  6:09 AM    Specimen: Nasopharyngeal   Result Value Ref Range    Influenza A by PCR Negative     Influenza B by PCR Negative    POCT Glucose    Collection Time: 12/29/21  6:09 AM   Result Value Ref Range    POC Glucose 113 60 - 115 mg/dl    Performed on ACCU-CHEK    POCT Glucose    Collection Time: 12/29/21  6:24 AM   Result Value Ref Range    Glucose 113 mg/dL    QC OK? pass    POCT CREATININE    Collection Time: 12/29/21  6:24 AM   Result Value Ref Range    POC CREATININE WHOLE BLOOD 1.0        IMAGING:  No results found.       ASSESSMENT AND PLAN    Active Problems:  #Seizure-like activity with syncopal episode  #Acute COVID-19 in unvaccinated patient, without hypoxia    Plan:  -Admit inpatient on telemetry  -COVID-19 precautions  -No indication for Decadron as no current hypoxia  -Neurology consulted by ED for syncopal episodes with witnessed seizure-like activity, appreciate recommendations.  -Continue Keppra initiated in ED, pending further neurology recommendations  -Seizure and fall precautions  -Supplemental O2 as needed to maintain SPO2 greater than 92% if incidence of new hypoxia  -Lovenox for DVT prophylaxis  -Labs collected in ED still pending some results including prolactin, follow-up when available.   -Trend Covid associated inflammatory markers including LDH and CRP      SIGNATURE: Ortiz العلي DO  DATE: December 29, 2021  TIME: 10:22 AM

## 2021-12-29 NOTE — ED NOTES
Patient resting in bed with lights on and call light within reach. Equal rise and fall of chest. No distress noted. Nurse will continue to monitor.       Maureen Fowler RN  12/29/21 1347

## 2021-12-29 NOTE — CONSULTS
Subjective:      Patient ID: Heather Irwin is a 73 y.o. female who presents today for seizure with focal onset seen in the emergency room urgently. HPI 73 right-handed female who was brought into the emergency room with a passing out episode at home. Patient was in the shower this occurred in the produce some focal onset of the same. She had one episode in the emergency room as well with some focal onset with tightness of the left lower extremity and spasm. Patient has no previous history of seizures patient reports in the past few days patient has dizziness upon sitting and standing up. She has had a dry cough fatigue poor appetite diarrhea chest pain chills sweating. She has not been able to drink much for the past 2 to 3 days. Her  is sick also with similar symptoms and had a positive home test.  Patient was brought to emergency room she had not yet had a diagnosis of COVID though when I saw her she had COVID. Patient was found in her bathroom with 1 and draped over the skin and the rest of the body she was not very close. Her eyes were glassy and she did not respond and had facial grimacing. She likely hit her head she was somewhat confused and then reached baseline. The triage she had a 1 minute episode again when she turned her head to the right and was not responding and the left leg appeared to be stiff. This lasted 30 seconds. When seen patient was asymptomatic did not report a headache and had no recall of events except she notes that she passed out    Review of Systems   Constitutional: Negative for fever. HENT: Negative for ear pain, tinnitus and trouble swallowing. Eyes: Negative for photophobia and visual disturbance. Respiratory: Positive for shortness of breath. Negative for choking. Cardiovascular: Negative for chest pain and palpitations. Gastrointestinal: Negative for nausea and vomiting.    Musculoskeletal: Negative for back pain, gait problem, joint swelling, myalgias, neck pain and neck stiffness. Skin: Negative for color change. Allergic/Immunologic: Negative for food allergies. Neurological: Positive for dizziness, weakness and light-headedness. Negative for tremors, seizures, syncope, facial asymmetry, speech difficulty, numbness and headaches. Psychiatric/Behavioral: Negative for behavioral problems, confusion, hallucinations and sleep disturbance. Past Medical History:   Diagnosis Date    Osteoarthritis     neck    Seizure ([address] 12/29/2021     Past Surgical History:   Procedure Laterality Date    BREAST CYST EXCISION      in pt's 20's     Social History     Socioeconomic History    Marital status:      Spouse name: Not on file    Number of children: Not on file    Years of education: Not on file    Highest education level: Not on file   Occupational History    Not on file   Tobacco Use    Smoking status: Never Smoker    Smokeless tobacco: Never Used   Substance and Sexual Activity    Alcohol use: No    Drug use: No    Sexual activity: Not on file   Other Topics Concern    Not on file   Social History Narrative    Not on file     Social Determinants of Health     Financial Resource Strain: Low Risk     Difficulty of Paying Living Expenses: Not hard at all   Food Insecurity: No Food Insecurity    Worried About [address] in the Last Year: Never true    [address]  in the Last Year: Never true   Transportation Needs:     Lack of Transportation (Medical): Not on file    Lack of Transportation (Non-Medical):  Not on file   Physical Activity:     Days of Exercise per Week: Not on file    Minutes of Exercise per Session: Not on file   Stress:     Feeling of Stress : Not on file   Social Connections:     Frequency of Communication with Friends and Family: Not on file    Frequency of Social Gatherings with Friends and Family: Not on file    Attends Religion Services: Not on file   [employer] or Organizations: Not on file    Attends Club or Organization Meetings: Not on file    Marital Status: Not on file   Intimate Partner Violence:     Fear of Current or Ex-Partner: Not on file    Emotionally Abused: Not on file    Physically Abused: Not on file    Sexually Abused: Not on file   Housing Stability:     Unable to Pay for Housing in the Last Year: Not on file    Number of [place] in the Last Year: Not on file    Unstable Housing in the Last Year: Not on file     Family History   Problem Relation Age of Onset    Diabetes Father      Allergies   Allergen Reactions    Pcn [Penicillins] Swelling and Rash     Current Facility-Administered Medications   Medication Dose Route Frequency Provider Last Rate Last Admin    sodium chloride flush 0.9 % injection 10 mL  10 mL IntraVENous BID Marie Dolan, PA-C        sodium chloride flush 0.9 % injection 5-40 mL  5-40 mL IntraVENous 2 times per day Aurther Savant, DO        sodium chloride flush 0.9 % injection 5-40 mL  5-40 mL IntraVENous PRN Aurther Savant, DO        0.9 % sodium chloride infusion  25 mL IntraVENous PRN Aurther Savant, DO        LORazepam (ATIVAN) injection 1 mg  1 mg IntraVENous Q5 Min PRN Aurther Savant, DO        enoxaparin (LOVENOX) injection 40 mg  40 mg SubCUTAneous Daily Aurther Savant, DO   40 mg at 12/29/21 1204    ondansetron (ZOFRAN-ODT) disintegrating tablet 4 mg  4 mg Oral Q8H PRN Aurther Savant, DO        Or    ondansetron (ZOFRAN) injection 4 mg  4 mg IntraVENous Q6H PRN Aurther Savant, DO        polyethylene glycol (GLYCOLAX) packet 17 g  17 g Oral Daily PRN Aurther Savant, DO        acetaminophen (TYLENOL) tablet 650 mg  650 mg Oral Q6H PRN Aurther Savant, DO        Or    acetaminophen (TYLENOL) suppository 650 mg  650 mg Rectal Q6H PRN Aurther Savant, DO         Current Outpatient Medications   Medication Sig Dispense Refill    butalbital-acetaminophen-caffeine (FIORICET, ESGIC) -40 MG per tablet Take 1 tablet workstation. COMPARISON:none FINDINGS:  Pulmonary arteries: No intraluminal filling defects. Thoracic aorta: Normal in course and caliber. Cardiac Size: Normal. Pericardial effusion: None. Right lung: No nodules, masses, pleural effusion, pneumothorax. Diffuse peripheral multifocal groundglass opacities, right lung. Left lung: No nodules, masses, pleural effusion, pneumothorax. Diffuse, peripheral, groundglass opacities, left lung. Lymph nodes: No hilar, mediastinal, or axillary lymph node enlargement. Upper abdomen:Limited imaging upper abdomen shows 1 cm cyst, anterior left hepatic lobe. Musculoskeletal:No osteoblastic, and no osteolytic lesions. No CT evidence pulmonary embolism. Bilateral, multifocal, peripheral, pulmonary groundglass opacities. These findings may be seen in patients with covid 19 pneumonia. However, other infectious and inflammatory etiologies may result in a similar radiographic appearance. All CT scans at this facility use dose modulation, iterative reconstruction, and/or weight based dosing when appropriate to reduce radiation dose to as low as reasonably achievable.        Lab Results   Component Value Date    WBC 5.5 12/29/2021    RBC 3.83 12/29/2021    HGB 11.7 12/29/2021    HCT 35.1 12/29/2021    MCV 91.7 12/29/2021    MCH 30.5 12/29/2021    MCHC 33.3 12/29/2021    RDW 13.2 12/29/2021     12/29/2021     Lab Results   Component Value Date     12/29/2021    K 3.9 12/29/2021     12/29/2021    CO2 25 12/29/2021    BUN 20 12/29/2021    CREATININE 1.0 12/29/2021    CREATININE 1.01 12/29/2021    GFRAA >60 12/29/2021    LABGLOM 56 12/29/2021    GLUCOSE 113 12/29/2021    PROT 7.1 12/29/2021    LABALBU 3.8 12/29/2021    CALCIUM 9.2 12/29/2021    BILITOT 0.4 12/29/2021    ALKPHOS 85 12/29/2021    AST 25 12/29/2021    ALT 17 12/29/2021     Lab Results   Component Value Date    PROTIME 13.2 12/29/2021    INR 1.0 12/29/2021     Lab Results   Component Value Date    TSH 2.545 11/08/2013    IRON 99 10/21/2021    TIBC 317 10/21/2021     Lab Results   Component Value Date    TRIG 126 05/02/2019    HDL 59 05/02/2019    LDLCALC 152 05/02/2019     No results found for: LABAMPH, BARBSCNU, LABBENZ, CANNAB, COCAINESCRN, LABMETH, OPIATESCREENURINE, PHENCYCLIDINESCREENURINE, PPXUR, ETOH  No results found for: LITHIUM, DILFRTOT, VALPROATE    Assessment:   Convulsive syncope or a focal partial seizure with secondary generalization. The episode that occurred in the triage was quite focal in onset. This is of concern as focal onset seizures likely to have some pathologies of concern. Patient should be admitted to hospital with a follow-up MRI with contrast and an EEG. We will start her on Keppra 1000 mg twice daily for now and continue to observe. Patient should have orthostatic blood pressure recordings done. While we were examining her her blood pressure was only 90/64 which could be contributory to a convulsive syncope and may not be a true seizure. We though we will treat this as a seizure for now. Patient has now just been diagnosed with COVID. Further management will depend on patient's responses. We will follow in a few hours. Next well this is a urgent consultation from the emergency room and therefore critical care time of 45 minutes      iSna Hinds MD, Shana Sharpe, American Board of Psychiatry & Neurology  Board Certified in Vascular Neurology  Board Certified in Neuromuscular Medicine  Certified in [facility]:

## 2021-12-29 NOTE — ED NOTES
Patient medicated per MAR. All questions answered and allergies reviewed. Will continue to monitor.       Darrian Valladares RN  12/29/21 4814

## 2021-12-30 LAB
ALBUMIN SERPL-MCNC: 3.2 G/DL (ref 3.5–4.6)
ALP BLD-CCNC: 65 U/L (ref 40–130)
ALT SERPL-CCNC: 14 U/L (ref 0–33)
ANION GAP SERPL CALCULATED.3IONS-SCNC: 7 MEQ/L (ref 9–15)
AST SERPL-CCNC: 19 U/L (ref 0–35)
BASOPHILS ABSOLUTE: 0 K/UL (ref 0–0.2)
BASOPHILS RELATIVE PERCENT: 0.4 %
BILIRUB SERPL-MCNC: 0.3 MG/DL (ref 0.2–0.7)
BUN BLDV-MCNC: 13 MG/DL (ref 8–23)
C-REACTIVE PROTEIN: 52.8 MG/L (ref 0–5)
CALCIUM SERPL-MCNC: 8.3 MG/DL (ref 8.5–9.9)
CHLORIDE BLD-SCNC: 101 MEQ/L (ref 95–107)
CO2: 24 MEQ/L (ref 20–31)
CREAT SERPL-MCNC: 0.74 MG/DL (ref 0.5–0.9)
D DIMER: 0.3 MG/L FEU (ref 0–0.5)
EOSINOPHILS ABSOLUTE: 0 K/UL (ref 0–0.7)
EOSINOPHILS RELATIVE PERCENT: 0.1 %
GFR AFRICAN AMERICAN: >60
GFR NON-AFRICAN AMERICAN: >60
GLOBULIN: 2.7 G/DL (ref 2.3–3.5)
GLUCOSE BLD-MCNC: 96 MG/DL (ref 70–99)
HCT VFR BLD CALC: 30.3 % (ref 37–47)
HEMOGLOBIN: 10.1 G/DL (ref 12–16)
LACTATE DEHYDROGENASE: 254 U/L (ref 135–214)
LYMPHOCYTES ABSOLUTE: 1 K/UL (ref 1–4.8)
LYMPHOCYTES RELATIVE PERCENT: 17.7 %
MCH RBC QN AUTO: 30.4 PG (ref 27–31.3)
MCHC RBC AUTO-ENTMCNC: 33.2 % (ref 33–37)
MCV RBC AUTO: 91.7 FL (ref 82–100)
MONOCYTES ABSOLUTE: 0.4 K/UL (ref 0.2–0.8)
MONOCYTES RELATIVE PERCENT: 7.4 %
NEUTROPHILS ABSOLUTE: 4.2 K/UL (ref 1.4–6.5)
NEUTROPHILS RELATIVE PERCENT: 74.4 %
PDW BLD-RTO: 13.3 % (ref 11.5–14.5)
PLATELET # BLD: ABNORMAL K/UL (ref 130–400)
PLATELET SLIDE REVIEW: ABNORMAL
POTASSIUM REFLEX MAGNESIUM: 3.8 MEQ/L (ref 3.4–4.9)
RBC # BLD: 3.31 M/UL (ref 4.2–5.4)
RBC # BLD: NORMAL 10*6/UL
REASON FOR REJECTION: NORMAL
REJECTED TEST: NORMAL
SODIUM BLD-SCNC: 132 MEQ/L (ref 135–144)
TOTAL PROTEIN: 5.9 G/DL (ref 6.3–8)
WBC # BLD: 5.7 K/UL (ref 4.8–10.8)

## 2021-12-30 PROCEDURE — 1210000000 HC MED SURG R&B

## 2021-12-30 PROCEDURE — 85379 FIBRIN DEGRADATION QUANT: CPT

## 2021-12-30 PROCEDURE — 2580000003 HC RX 258: Performed by: INTERNAL MEDICINE

## 2021-12-30 PROCEDURE — 6370000000 HC RX 637 (ALT 250 FOR IP): Performed by: INTERNAL MEDICINE

## 2021-12-30 PROCEDURE — 96372 THER/PROPH/DIAG INJ SC/IM: CPT

## 2021-12-30 PROCEDURE — 85025 COMPLETE CBC W/AUTO DIFF WBC: CPT

## 2021-12-30 PROCEDURE — 99233 SBSQ HOSP IP/OBS HIGH 50: CPT | Performed by: PSYCHIATRY & NEUROLOGY

## 2021-12-30 PROCEDURE — 6360000002 HC RX W HCPCS: Performed by: INTERNAL MEDICINE

## 2021-12-30 PROCEDURE — 6370000000 HC RX 637 (ALT 250 FOR IP): Performed by: PSYCHIATRY & NEUROLOGY

## 2021-12-30 PROCEDURE — G0378 HOSPITAL OBSERVATION PER HR: HCPCS

## 2021-12-30 PROCEDURE — 2580000003 HC RX 258: Performed by: STUDENT IN AN ORGANIZED HEALTH CARE EDUCATION/TRAINING PROGRAM

## 2021-12-30 PROCEDURE — 96366 THER/PROPH/DIAG IV INF ADDON: CPT

## 2021-12-30 PROCEDURE — 36415 COLL VENOUS BLD VENIPUNCTURE: CPT

## 2021-12-30 PROCEDURE — 97166 OT EVAL MOD COMPLEX 45 MIN: CPT

## 2021-12-30 PROCEDURE — 80053 COMPREHEN METABOLIC PANEL: CPT

## 2021-12-30 PROCEDURE — 2580000003 HC RX 258: Performed by: PSYCHIATRY & NEUROLOGY

## 2021-12-30 PROCEDURE — 86140 C-REACTIVE PROTEIN: CPT

## 2021-12-30 PROCEDURE — 6360000002 HC RX W HCPCS: Performed by: PSYCHIATRY & NEUROLOGY

## 2021-12-30 PROCEDURE — 97161 PT EVAL LOW COMPLEX 20 MIN: CPT

## 2021-12-30 PROCEDURE — 83615 LACTATE (LD) (LDH) ENZYME: CPT

## 2021-12-30 RX ORDER — BUTALBITAL, ACETAMINOPHEN AND CAFFEINE 300; 40; 50 MG/1; MG/1; MG/1
1 CAPSULE ORAL EVERY 6 HOURS PRN
Status: DISCONTINUED | OUTPATIENT
Start: 2021-12-30 | End: 2021-12-31 | Stop reason: HOSPADM

## 2021-12-30 RX ORDER — LEVETIRACETAM 500 MG/1
500 TABLET ORAL 2 TIMES DAILY
Status: DISCONTINUED | OUTPATIENT
Start: 2021-12-30 | End: 2021-12-31 | Stop reason: HOSPADM

## 2021-12-30 RX ORDER — BUTALBITAL, ACETAMINOPHEN AND CAFFEINE 50; 325; 40 MG/1; MG/1; MG/1
1 CAPSULE ORAL EVERY 6 HOURS PRN
Status: DISCONTINUED | OUTPATIENT
Start: 2021-12-30 | End: 2021-12-30 | Stop reason: CLARIF

## 2021-12-30 RX ORDER — BUTALBITAL, ACETAMINOPHEN AND CAFFEINE 50; 325; 40 MG/1; MG/1; MG/1
1 TABLET ORAL EVERY 6 HOURS PRN
Status: DISCONTINUED | OUTPATIENT
Start: 2021-12-30 | End: 2021-12-30 | Stop reason: CLARIF

## 2021-12-30 RX ORDER — CELECOXIB 200 MG/1
200 CAPSULE ORAL DAILY PRN
Status: DISCONTINUED | OUTPATIENT
Start: 2021-12-30 | End: 2021-12-31 | Stop reason: HOSPADM

## 2021-12-30 RX ORDER — CELECOXIB 200 MG/1
200 CAPSULE ORAL DAILY
Status: DISCONTINUED | OUTPATIENT
Start: 2021-12-30 | End: 2021-12-30

## 2021-12-30 RX ADMIN — SODIUM CHLORIDE, PRESERVATIVE FREE 10 ML: 5 INJECTION INTRAVENOUS at 23:43

## 2021-12-30 RX ADMIN — LEVETIRACETAM 500 MG: 500 TABLET, FILM COATED ORAL at 23:24

## 2021-12-30 RX ADMIN — SODIUM CHLORIDE, PRESERVATIVE FREE 10 ML: 5 INJECTION INTRAVENOUS at 11:15

## 2021-12-30 RX ADMIN — ACETAMINOPHEN 650 MG: 325 TABLET ORAL at 12:01

## 2021-12-30 RX ADMIN — CELECOXIB 200 MG: 200 CAPSULE ORAL at 23:43

## 2021-12-30 RX ADMIN — LEVETIRACETAM 750 MG: 100 INJECTION INTRAVENOUS at 00:39

## 2021-12-30 RX ADMIN — Medication 10 ML: at 23:44

## 2021-12-30 RX ADMIN — ENOXAPARIN SODIUM 40 MG: 100 INJECTION SUBCUTANEOUS at 08:08

## 2021-12-30 RX ADMIN — GUAIFENESIN 600 MG: 600 TABLET ORAL at 08:09

## 2021-12-30 RX ADMIN — GUAIFENESIN 600 MG: 600 TABLET ORAL at 23:24

## 2021-12-30 RX ADMIN — LEVETIRACETAM 500 MG: 500 TABLET, FILM COATED ORAL at 11:15

## 2021-12-30 RX ADMIN — BUTALBITA,ACETAMINOPHEN AND CAFFEINE 1 CAPSULE: 50; 300; 40 CAPSULE ORAL at 23:43

## 2021-12-30 ASSESSMENT — PAIN DESCRIPTION - LOCATION
LOCATION: HEAD

## 2021-12-30 ASSESSMENT — PAIN SCALES - GENERAL
PAINLEVEL_OUTOF10: 9
PAINLEVEL_OUTOF10: 3
PAINLEVEL_OUTOF10: 5

## 2021-12-30 ASSESSMENT — PAIN DESCRIPTION - PAIN TYPE
TYPE: ACUTE PAIN

## 2021-12-30 ASSESSMENT — PAIN DESCRIPTION - DESCRIPTORS
DESCRIPTORS: ACHING
DESCRIPTORS: HEADACHE
DESCRIPTORS: HEADACHE

## 2021-12-30 ASSESSMENT — PAIN DESCRIPTION - PROGRESSION: CLINICAL_PROGRESSION: GRADUALLY IMPROVING

## 2021-12-30 ASSESSMENT — PAIN - FUNCTIONAL ASSESSMENT: PAIN_FUNCTIONAL_ASSESSMENT: ACTIVITIES ARE NOT PREVENTED

## 2021-12-30 ASSESSMENT — ENCOUNTER SYMPTOMS
PHOTOPHOBIA: 0
VOMITING: 0
COLOR CHANGE: 0
CHOKING: 0
BACK PAIN: 0
TROUBLE SWALLOWING: 0
SHORTNESS OF BREATH: 0
NAUSEA: 0

## 2021-12-30 ASSESSMENT — PAIN DESCRIPTION - ONSET: ONSET: ON-GOING

## 2021-12-30 ASSESSMENT — PAIN DESCRIPTION - FREQUENCY: FREQUENCY: CONTINUOUS

## 2021-12-30 NOTE — PROGRESS NOTES
Physical Therapy Med Surg Initial Assessment  Facility/Department: [facility] ED  Room:      NAME: Laurent Hawkins  :  (73 y.o.)  MRN: [id number]  CODE STATUS: Full Code    Date of Service: 2021    Patient Diagnosis(es): Seizure [facility]) [R56.9]   No chief complaint on file.     Patient Active Problem List    Diagnosis Date Noted    Seizure [facility]) 2021        Past Medical History:   Diagnosis Date    Osteoarthritis     neck    Seizure ([facility] [address] 2021     Past Surgical History:   Procedure Laterality Date    BREAST CYST EXCISION      in pt's      Chart Reviewed: Yes  Patient assessed for rehabilitation services?: Yes  Family / Caregiver Present: No  General Comment  Comments: Pt resting in bed, agreeable to PT eval    Restrictions:  Restrictions/Precautions: Seizure,Isolation (COVID 19+:droplet +)     SUBJECTIVE: Subjective: Pt reports    Pain  Pre Treatment Pain Screening  Pain at present: 3  Scale Used: Numeric Score  Intervention List: Patient able to continue with treatment;Patient declined any intervention    Post Treatment Pain Screening:   Pain Screening  Patient Currently in Pain: Yes  Pain Assessment  Pain Assessment: 0-10  Pain Level: 3  Pain Type: Acute pain  Pain Location: Head  Pain Descriptors: Headache    Prior Level of Function:  Social/Functional History  Lives With: Spouse  Type of Home: House  Home Layout: Multi-level,Bed/Bath upstairs (6 steps with handrail, full bath on main and upper level)  Home Access: Stairs to enter without rails  Entrance Stairs - Number of Steps: 2  Bathroom Equipment:  (none)  Home Equipment:  (none)  ADL Assistance: Independent  Homemaking Assistance: Independent  Homemaking Responsibilities: Yes  Ambulation Assistance: Independent (none)  Transfer Assistance: Independent  Active : Yes    OBJECTIVE:   Vision: Impaired  Vision Exceptions: Wears glasses at all times  Hearing: Within functional limits    Cognition:  Overall Orientation Status: Impaired  Orientation Level: Oriented to situation,Oriented to person,Oriented to place,Disoriented to time (December 2021, aware of end of month)  Follows Commands: Within Functional Limits    Observation/Palpation  Observation: pleasant, cooperative, no acute distress noted, on RA, BP supine 103/52mmHg (68), seated 121/81 mmHg (94), standing 117/68 mmHg (80)    ROM:  RLE AROM: WFL  LLE AROM : WFL    Strength:  Strength RLE  Comment: grossly 4/5  Strength LLE  Comment: grossly 4/5    Neuro:  Balance  Sitting - Static: Good  Sitting - Dynamic: Good  Standing - Static: Fair;+  Standing - Dynamic: Fair;+     Tone RLE  RLE Tone: Normotonic  Tone LLE  LLE Tone: Normotonic  Motor Control  Gross Motor?: WFL  Sensation  Overall Sensation Status: WFL    Bed mobility  Supine to Sit: Supervision  Sit to Supine: Supervision    Transfers  Sit to Stand: Supervision  Stand to sit: Supervision    Ambulation  Ambulation?: Yes  Ambulation 1  Surface: level tile  Device: No Device  Assistance: Stand by assistance  Gait Deviations: Slow Clarice; Increased BRIAN; Decreased step length  Distance: 40ft distances limited in room d/t covid isolation  Comments: SBA d/t seizure precautions     Activity Tolerance  Activity Tolerance: Patient Tolerated treatment well      PT Education  PT Education: PT Role;General Safety    ASSESSMENT:   Body structures, Functions, Activity limitations: Decreased functional mobility ; Decreased strength;Decreased cognition;Decreased balance; Increased pain;Decreased coordination;Decreased posture;Decreased safe awareness;Decreased ROM  Decision Making: Medium Complexity  History: med  Exam: med  Clinical Presentation: med    Prognosis: Good  Barriers to Learning: none    DISCHARGE RECOMMENDATIONS:  Discharge Recommendations: Continue to assess pending progress,Patient would benefit from continued therapy after discharge  Assessment: Pt demonstrates safe functional mobility for home going with no need for AD. Pt ed in safety techniques and activity pacing in setting of recent fall at home and decreased energy levels as she is recovering from COVID-19. REQUIRES PT FOLLOW UP: Yes      PLAN OF CARE:  Plan  Times per week: 3-6  Current Treatment Recommendations: Functional Mobility Training,Strengthening,Neuromuscular Re-education,Home Exercise Program,Equipment Evaluation, Education, & procurement,Modalities,Safety Education & Training,Gait Training,Transfer Training,Balance Training,Stair training,Patient/Caregiver Education & Training,Positioning  Safety Devices  Type of devices: Left in bed,Call light within reach    Goals:  Patient goals : to go home    [facility] (6 CLICK) [place] Raw Score : 18      Therapy Time:   Individual   Time In [facility]   Time Out 1547   Minutes [address] [facility], [address], 12/30/21 at 4:13 PM     Definitions for assistance levels  Independent = pt does not require any physical supervision or assistance from another person for activity completion. Device may be needed.   Stand by assistance = pt requires verbal cues or instructions from another person, close to but not touching, to perform the activity  Minimal assistance= pt performs 75% or more of the activity; assistance is required to complete the activity  Moderate assistance= pt performs 50% of the activity; assistance is required to complete the activity  Maximal assistance = pt performs 25% of the activity; assistance is required to complete the activity  Dependent = pt requires total physical assistance to accomplish the task

## 2021-12-30 NOTE — ED NOTES
Pt remains in [facility] [address], [facility]  12/30/21 8229

## 2021-12-30 NOTE — ED NOTES
Pt remains asleep in ED cot at this time w/resps even and unlabored. No s/s of distress noted. Will continue to monitor pt.       Ann Mccord RN  12/30/21 2410

## 2021-12-30 NOTE — ED NOTES
Patient resting in bed with lights on and call light within reach. Equal rise and fall of chest. No distress noted. Nurse will continue to monitor. Bed in lowest position. No complaints at this time.       Catalino Maxwell RN  12/29/21 2671

## 2021-12-30 NOTE — PROGRESS NOTES
Progress Note  Date:2021       Room:  Patient Name:Emili Faulkner     YOB: 1955     Age:66 y.o. Subjective    Subjective   No acute events overnight. Mild temperature elevation 99.3 °F.  Continues to saturate well on room air, 96%. Sinus blood pressure borderline low in the 90s overnight, asymptomatic. Headache this morning, for which home medications were resumed. Sodium slightly low at 132. Review of Systems   Headache, improved after resumption of home medications  Generalized malaise, unchanged from prior. No other new/acute complaints. Objective         Vitals Last 24 Hours:  TEMPERATURE:  Temp  Av.3 °F (37.4 °C)  Min: 99.3 °F (37.4 °C)  Max: 99.3 °F (37.4 °C)  RESPIRATIONS RANGE: Resp  Av.4  Min: 14  Max: 19  PULSE OXIMETRY RANGE: SpO2  Av.4 %  Min: 94 %  Max: 99 %  PULSE RANGE: Pulse  Av  Min: 72  Max: 101  BLOOD PRESSURE RANGE: Systolic (21LWQ), AQS:884 , Min:90 , OXO:962   ; Diastolic (92HLQ), TANYA:45, Min:54, Max:69    I/O (24Hr): Intake/Output Summary (Last 24 hours) at 2021 4011  Last data filed at 2021 0228  Gross per 24 hour   Intake 100 ml   Output --   Net 100 ml     Objective     Constitutional: Elderly adult female reclining in ED bed no acute distress. ENT: Hearing grossly intact. No rhinorrhea. Neck: Trachea midline, phonation normal  Cardiovascular: RRR, no significant murmurs appreciated. Warm and well perfused. No pretibial edema. Pulmonary: Normal rate and effort of respiration on room air. Faint diffuse bilateral fine Velcro crackles. No coughing during exam.  Abdomen: Flat, soft, nontense abdomen. Hypoactive bowel sounds. Nontender to palpation. Neurologic: Alert, grossly oriented. Mild generalized weakness. Psychiatric: Pleasant, calm, cooperative.       Labs/Imaging/Diagnostics    Labs:  CBC:  Recent Labs     21  0545   WBC 5.5   RBC 3.83*   HGB 11.7*   HCT 35.1*   MCV 91.7   RDW 13.2      CHEMISTRIES:  Recent Labs     12/29/21  0545 12/29/21  0610 12/29/21  0624     --   --    K 3.9  --   --      --   --    CO2 25  --   --    BUN 20  --   --    CREATININE 1.01* 1.0  --    GLUCOSE 135*  --  113   MG 2.4  --   --      PT/INR:  Recent Labs     12/29/21 0545   PROTIME 13.2   INR 1.0     APTT:  Recent Labs     12/29/21 0545   APTT 38.2*     LIVER PROFILE:  Recent Labs     12/29/21 0545   AST 25   ALT 17   BILITOT 0.4   ALKPHOS 85       Imaging Last 24 Hours:  CT HEAD WO CONTRAST    Result Date: 12/29/2021  CT Brain. Contrast medium:  without contrast.. History:  Fall, seizure. Technical factors: CT imaging of the brain was obtained and formatted as 5 mm contiguous axial images. 2.5 mm contiguous axial images were obtained through the osseous structures. Sagittal and coronal reconstruction obtained during postprocessing. Comparison:  None. Findings: Extra-axial spaces:  Normal. Intracranial hemorrhage:  None. Ventricular system: [Negative. Basal Cisterns:  Without anomaly. Cerebral Parenchyma: Without anomaly. Midline Shift:  None. Cerebellum:  No anomaly identified. Paranasal sinuses and mastoid air cells:  No anomaly identified. Visualized Orbits:  Negative. Impression: Negative CT of the brain. All CT scans at this facility use dose modulation, iterative reconstruction, and/or weight based dosing when appropriate to reduce radiation dose to as low as reasonably achievable. CTA Chest W WO  (PE study)    Result Date: 12/29/2021  CT PULMONARY ANGIOGRAM WITH INTRAVENOUS CONTRAST MEDIUM. REASON FOR EXAMINATION:  SYNCOPE TECHNIQUE: Helical CTA was performed through the chest utilizing 100 ml of Isovue-300 intravenous contrast.  Images were obtained with bolus tracking in order to opacify the pulmonary arteries. Thick section coronal MIP 3D reconstructions were performed  on a separate workstation.  COMPARISON:none FINDINGS:  Pulmonary arteries: No intraluminal filling defects. Thoracic aorta: Normal in course and caliber. Cardiac Size: Normal. Pericardial effusion: None. Right lung: No nodules, masses, pleural effusion, pneumothorax. Diffuse peripheral multifocal groundglass opacities, right lung. Left lung: No nodules, masses, pleural effusion, pneumothorax. Diffuse, peripheral, groundglass opacities, left lung. Lymph nodes: No hilar, mediastinal, or axillary lymph node enlargement. Upper abdomen:Limited imaging upper abdomen shows 1 cm cyst, anterior left hepatic lobe. Musculoskeletal:No osteoblastic, and no osteolytic lesions. No CT evidence pulmonary embolism. Bilateral, multifocal, peripheral, pulmonary groundglass opacities. These findings may be seen in patients with covid 19 pneumonia. However, other infectious and inflammatory etiologies may result in a similar radiographic appearance. All CT scans at this facility use dose modulation, iterative reconstruction, and/or weight based dosing when appropriate to reduce radiation dose to as low as reasonably achievable. CT CERVICAL SPINE WO CONTRAST    Result Date: 12/29/2021  CT cervical spine without intravenous contrast medium. HISTORY: Fall. Neck pain TECHNICAL FACTORS: CT cervical spine obtained and formatted as 2.5 mm contiguous axial images from skull base to the level of. Sagittal and coronal reconstructions were obtained during postprocessing. No contrast medium was utilized. COMPARISON: None FINDINGS: Cervical vertebral bodies are normal in height and alignment. Atlantooccipital articulation maintained. Atlantoaxial interval preserved. Neural foramina intact. Disc space narrowing C6-7. No fractures, dislocations, bone lesions. Limited imaging lung apices demonstrates biapical fibrosis. Carotid arteries and soft tissues are without anomaly. No fracture.  Degenerative change, lower cervical spine All CT scans at this facility use dose modulation, iterative reconstruction, and/or weight based dosing when appropriate to reduce radiation dose to as low as reasonably achievable. [facility] MRI BRAIN W WO CONTRAST    Result Date: 12/29/2021  MRI BRAIN W WO CONTRAST : 12/29/2021 CLINICAL HISTORY:  seizures/ r/o AVM. COMPARISON: CT abdomen and pelvis from earlier 12/29/2021. TECHNIQUE: Multiplanar MR imaging of the head was performed before and after intravenous administration of approximately 15 mL of ProHance gadolinium contrast. FINDINGS: There is no infarct, intracranial hemorrhage, mass effect, midline shift, abnormal enhancement, extra-axial collection, or hydrocephalus. There is no significant atrophy or white matter changes, for age. Fluid within some inferior left mastoid air cells is again noted. NO ACUTE INTRACRANIAL PROCESS OR EVIDENCE OF VASCULAR MALFORMATION IDENTIFIED. Assessment//Plan           Hospital Problems           Last Modified POA    Seizure [facility]) 12/29/2021 Yes        Assessment & Plan    Active Problems:  #Seizure-like activity with syncopal episode  #Acute COVID-19 in unvaccinated patient, without hypoxia     Plan:  -Admit inpatient on telemetry  -COVID-19 precautions  -No indication for Decadron as no current hypoxia  -Neurology consulted by ED for syncopal episodes with witnessed seizure-like activity, appreciate recommendations.  -Continue Keppra initiated in ED, pending further neurology recommendations  -Seizure and fall precautions  -Supplemental O2 as needed to maintain SPO2 greater than 92% if incidence of new hypoxia  -Lovenox for DVT prophylaxis  -Labs collected in ED still pending some results including prolactin, follow-up when available. -Trend Covid associated inflammatory markers including LDH and CRP  12/30: Neurology planning MRI and EEG. Recommended Orthostatics, which were negative. BP remained borderline hypotensive throughout yesterday afternoon, but not requiring bolus or pressors.   MRI brain 12/29: \"NO ACUTE INTRACRANIAL PROCESS OR EVIDENCE OF VASCULAR MALFORMATION IDENTIFIED. \"  Awaiting results of EEG and subsequent neurology recommendations for disposition. Will discontinue checking D-dimer, given resumption of normal levels.         Electronically signed by Anny Llamas DO on 12/30/21 at 7:42 AM EST

## 2021-12-30 NOTE — PLAN OF CARE
See OT evaluation for all goals and OT POC.  Electronically signed by KEILY Reed/L on 12/30/2021 at 4:44 PM

## 2021-12-30 NOTE — PROGRESS NOTES
Subjective:      Patient ID: Jane Tee is a 73 y.o. female who presents today for patient seen in the ER to the nose is patient is quite positive and full examination was not done. No reports of further seizures and she is ambulatory and her blood pressures are somewhat better but the hypotensive. She is still dizzy. HPI    Review of Systems   Constitutional: Negative for fever. HENT: Negative for ear pain, tinnitus and trouble swallowing. Eyes: Negative for photophobia and visual disturbance. Respiratory: Negative for choking and shortness of breath. Cardiovascular: Negative for chest pain and palpitations. Gastrointestinal: Negative for nausea and vomiting. Musculoskeletal: Negative for back pain, gait problem, joint swelling, myalgias, neck pain and neck stiffness. Skin: Negative for color change. Allergic/Immunologic: Negative for food allergies. Neurological: Negative for dizziness, tremors, seizures, syncope, facial asymmetry, speech difficulty, weakness, light-headedness, numbness and headaches. Psychiatric/Behavioral: Negative for behavioral problems, confusion, hallucinations and sleep disturbance.        Past Medical History:   Diagnosis Date    Osteoarthritis     neck    Seizure ([facility] [address] 12/29/2021     Past Surgical History:   Procedure Laterality Date    BREAST CYST EXCISION      in pt's 20's     Social History     Socioeconomic History    Marital status:      Spouse name: Not on file    Number of children: Not on file    Years of education: Not on file    Highest education level: Not on file   Occupational History    Not on file   Tobacco Use    Smoking status: Never Smoker    Smokeless tobacco: Never Used   Substance and Sexual Activity    Alcohol use: No    Drug use: No    Sexual activity: Not on file   Other Topics Concern    Not on file   Social History Narrative    Not on file     Social Determinants of Health     Financial Resource Strain: Low Risk     Difficulty of Paying Living Expenses: Not hard at all   Food Insecurity: No Food Insecurity    Worried About Running Out of Food in the Last Year: Never true    Jonas of Food in the Last Year: Never true   Transportation Needs:     Lack of Transportation (Medical): Not on file    Lack of Transportation (Non-Medical):  Not on file   Physical Activity:     Days of Exercise per Week: Not on file    Minutes of Exercise per Session: Not on file   Stress:     Feeling of Stress : Not on file   Social Connections:     Frequency of Communication with Friends and Family: Not on file    Frequency of Social Gatherings with Friends and Family: Not on file    Attends Voodoo Services: Not on file    Active Member of [address] [place] [address] [employer] or Organizations: Not on file    Attends Club or Organization Meetings: Not on file    Marital Status: Not on file   Intimate Partner Violence:     Fear of Current or Ex-Partner: Not on file    Emotionally Abused: Not on file    Physically Abused: Not on file    Sexually Abused: Not on file   Housing Stability:     Unable to Pay for Housing in the Last Year: Not on file    Number of [place] in the Last Year: Not on file    Unstable Housing in the Last Year: Not on file     Family History   Problem Relation Age of Onset    Diabetes Father      Allergies   Allergen Reactions    Pcn [Penicillins] Swelling and Rash     Current Facility-Administered Medications   Medication Dose Route Frequency Provider Last Rate Last Admin    celecoxib (CELEBREX) capsule 200 mg  200 mg Oral Daily PRN Sangeetha Foster DO        butalbital-APAP-caffeine -40 MG per capsule 1 capsule  1 capsule Oral Q6H PRN Sangeetha Foster DO        levETIRAcetam (KEPPRA) tablet 500 mg  500 mg Oral BID Roderick Henry MD        sodium chloride flush 0.9 % injection 10 mL  10 mL IntraVENous BID Chris Golden PA-C   10 mL at 12/29/21 2049    sodium chloride flush 0.9 % injection 5-40 mL  5-40 mL IntraVENous 2 times per day Carie Shivers, DO        sodium chloride flush 0.9 % injection 5-40 mL  5-40 mL IntraVENous PRN Carie Shivers, DO        0.9 % sodium chloride infusion  25 mL IntraVENous PRN Carie Shivers, DO        LORazepam (ATIVAN) injection 1 mg  1 mg IntraVENous Q5 Min PRN Carie Shivers, DO        enoxaparin (LOVENOX) injection 40 mg  40 mg SubCUTAneous Daily Carie Shivers, DO   40 mg at 12/30/21 7814    ondansetron (ZOFRAN-ODT) disintegrating tablet 4 mg  4 mg Oral Q8H PRN Carie Shivers, DO        Or    ondansetron [facility]) injection 4 mg  4 mg IntraVENous Q6H PRN Carie Shivers, DO        polyethylene glycol (GLYCOLAX) packet 17 g  17 g Oral Daily PRN Carie Shivers, DO        acetaminophen (TYLENOL) tablet 650 mg  650 mg Oral Q6H PRN Carie Shivers, DO        Or    acetaminophen (TYLENOL) suppository 650 mg  650 mg Rectal Q6H PRN Carie Shivers, DO        sodium chloride flush 0.9 % injection 10 mL  10 mL IntraVENous PRN Quan Duckworth MD        guaiFENesin [facility]) extended release tablet 600 mg  600 mg Oral BID Carie Shivers, DO   600 mg at 12/30/21 0809     Current Outpatient Medications   Medication Sig Dispense Refill    butalbital-acetaminophen-caffeine (FIORICET, ESGIC) -40 MG per tablet Take 1 tablet by mouth every 6 hours as needed for Headaches 30 tablet 2    celecoxib (CELEBREX) 200 MG capsule Take 1 capsule by mouth daily 30 capsule 5    cetirizine (ZYRTEC ALLERGY) 10 MG tablet Take 1 tablet by mouth daily 30 tablet 3        Objective:   /69   Pulse 107   Temp 99.3 °F (37.4 °C) (Oral)   Resp 26   Ht 5' 5\" (1.651 m)   Wt 131 lb (59.4 kg)   SpO2 97%   BMI 21.80 kg/m²     Physical Exam physical examination not done due to COVID and avoiding exposure. CT HEAD WO CONTRAST    Result Date: 12/29/2021  CT Brain. Contrast medium:  without contrast.. History:  Fall, seizure.  Technical factors: CT imaging of the brain was obtained and formatted as 5 mm contiguous axial images. 2.5 mm contiguous axial images were obtained through the osseous structures. Sagittal and coronal reconstruction obtained during postprocessing. Comparison:  None. Findings: Extra-axial spaces:  Normal. Intracranial hemorrhage:  None. Ventricular system: [Negative. Basal Cisterns:  Without anomaly. Cerebral Parenchyma: Without anomaly. Midline Shift:  None. Cerebellum:  No anomaly identified. Paranasal sinuses and mastoid air cells:  No anomaly identified. Visualized Orbits:  Negative. Impression: Negative CT of the brain. All CT scans at this facility use dose modulation, iterative reconstruction, and/or weight based dosing when appropriate to reduce radiation dose to as low as reasonably achievable. CTA Chest W WO  (PE study)    Result Date: 12/29/2021  CT PULMONARY ANGIOGRAM WITH INTRAVENOUS CONTRAST MEDIUM. REASON FOR EXAMINATION:  SYNCOPE TECHNIQUE: Helical CTA was performed through the chest utilizing 100 ml of Isovue-300 intravenous contrast.  Images were obtained with bolus tracking in order to opacify the pulmonary arteries. Thick section coronal MIP 3D reconstructions were performed  on a separate workstation. COMPARISON:none FINDINGS:  Pulmonary arteries: No intraluminal filling defects. Thoracic aorta: Normal in course and caliber. Cardiac Size: Normal. Pericardial effusion: None. Right lung: No nodules, masses, pleural effusion, pneumothorax. Diffuse peripheral multifocal groundglass opacities, right lung. Left lung: No nodules, masses, pleural effusion, pneumothorax. Diffuse, peripheral, groundglass opacities, left lung. Lymph nodes: No hilar, mediastinal, or axillary lymph node enlargement. Upper abdomen:Limited imaging upper abdomen shows 1 cm cyst, anterior left hepatic lobe. Musculoskeletal:No osteoblastic, and no osteolytic lesions. No CT evidence pulmonary embolism. Bilateral, multifocal, peripheral, pulmonary groundglass opacities.  These findings may be seen in patients with covid 19 pneumonia. However, other infectious and inflammatory etiologies may result in a similar radiographic appearance. All CT scans at this facility use dose modulation, iterative reconstruction, and/or weight based dosing when appropriate to reduce radiation dose to as low as reasonably achievable. CT CERVICAL SPINE WO CONTRAST    Result Date: 12/29/2021  CT cervical spine without intravenous contrast medium. HISTORY: Fall. Neck pain TECHNICAL FACTORS: CT cervical spine obtained and formatted as 2.5 mm contiguous axial images from skull base to the level of. Sagittal and coronal reconstructions were obtained during postprocessing. No contrast medium was utilized. COMPARISON: None FINDINGS: Cervical vertebral bodies are normal in height and alignment. Atlantooccipital articulation maintained. Atlantoaxial interval preserved. Neural foramina intact. Disc space narrowing C6-7. No fractures, dislocations, bone lesions. Limited imaging lung apices demonstrates biapical fibrosis. Carotid arteries and soft tissues are without anomaly. No fracture. Degenerative change, lower cervical spine All CT scans at this facility use dose modulation, iterative reconstruction, and/or weight based dosing when appropriate to reduce radiation dose to as low as reasonably achievable. Adolfo Lockett MRI BRAIN W WO CONTRAST    Result Date: 12/29/2021  MRI BRAIN W WO CONTRAST : 12/29/2021 CLINICAL HISTORY:  seizures/ r/o AVM. COMPARISON: CT abdomen and pelvis from earlier 12/29/2021. TECHNIQUE: Multiplanar MR imaging of the head was performed before and after intravenous administration of approximately 15 mL of ProHance gadolinium contrast. FINDINGS: There is no infarct, intracranial hemorrhage, mass effect, midline shift, abnormal enhancement, extra-axial collection, or hydrocephalus. There is no significant atrophy or white matter changes, for age. Fluid within some inferior left mastoid air cells is again noted.      NO ACUTE INTRACRANIAL PROCESS OR EVIDENCE OF VASCULAR MALFORMATION IDENTIFIED. Lab Results   Component Value Date    WBC 5.7 12/30/2021    RBC 3.31 12/30/2021    HGB 10.1 12/30/2021    HCT 30.3 12/30/2021    MCV 91.7 12/30/2021    MCH 30.4 12/30/2021    MCHC 33.2 12/30/2021    RDW 13.3 12/30/2021    PLT Not Done 12/30/2021     Lab Results   Component Value Date     12/30/2021    K 3.8 12/30/2021     12/30/2021    CO2 24 12/30/2021    BUN 13 12/30/2021    CREATININE 0.74 12/30/2021    GFRAA >60.0 12/30/2021    LABGLOM >60.0 12/30/2021    GLUCOSE 96 12/30/2021    PROT 5.9 12/30/2021    LABALBU 3.2 12/30/2021    CALCIUM 8.3 12/30/2021    BILITOT 0.3 12/30/2021    ALKPHOS 65 12/30/2021    AST 19 12/30/2021    ALT 14 12/30/2021     Lab Results   Component Value Date    PROTIME 13.2 12/29/2021    INR 1.0 12/29/2021     Lab Results   Component Value Date    TSH 2.545 11/08/2013    IRON 99 10/21/2021    TIBC 317 10/21/2021     Lab Results   Component Value Date    TRIG 126 05/02/2019    HDL 59 05/02/2019    LDLCALC 152 05/02/2019     No results found for: LABAMPH, BARBSCNU, LABBENZ, CANNAB, COCAINESCRN, LABMETH, OPIATESCREENURINE, PHENCYCLIDINESCREENURINE, PPXUR, ETOH  No results found for: LITHIUM, DILFRTOT, VALPROATE    Assessment:   Partial seizure which could have been secondary to convulsive syncope. MRI of the brain was reviewed and this appears to be normal there is no evidence of any scarring or AVM to be of concern for any focal partial seizures. EEG does not anything significant. Is likely that she may had low blood pressures causing a seizure-like episode. Given that she is COVID we will keep her on Keppra 500 milligrams twice a day for now and discontinue the medication further as an outpatient in 3 months unless recurrent seizures occur. From the neurological standpoint patient may be discharged or admitted and if admitted will follow      Sina Mendoza MD, Gee Garcia, American Board of Psychiatry & Neurology  Board Certified in Vascular Neurology  Board Certified in Neuromuscular Medicine  Certified in [facility]:

## 2021-12-30 NOTE — ED NOTES
This RN assumed care of pt from Mitali Fuchs, [address] [place] [address]. Pt is asleep at this time w/resps even and unlabored. Assessment deferred to allow pt to sleep. No s/s of distress noted. Will continue to monitor pt. Seizure precautions in place.       Ubaldo Whitaker RN  12/30/21 0800

## 2021-12-30 NOTE — PROGRESS NOTES
[facility] OCCUPATIONAL THERAPY EVALUATION - ACUTE     NAME: Celia Dickerson  : [birth year] (67 y.o.)  MRN: [id number]  CODE STATUS: Full Code  Room:     Date of Service: 2021    Patient Diagnosis(es): Seizure [facility]) [R56.9]   No chief complaint on file. Patient Active Problem List    Diagnosis Date Noted    Seizure [facility]) 2021        Past Medical History:   Diagnosis Date    Osteoarthritis     neck    Seizure ([address] 2021     Past Surgical History:   Procedure Laterality Date    BREAST CYST EXCISION      in pt's 20's        Restrictions  Restrictions/Precautions: Seizure,Isolation (COVID 19+:droplet +)     Safety Devices: Safety Devices  Safety Devices in place: Yes  Type of devices:  All fall risk precautions in place        Subjective  Pre Treatment Pain Screening  Pain at present: 3  Scale Used: Numeric Score  Intervention List: Patient able to continue with treatment,Patient declined any intervention    Pain Reassessment:   Pain Assessment  Patient Currently in Pain: Yes  Pain Assessment: 0-10  Pain Level: 3  Pain Type: Acute pain  Pain Location: Head  Pain Descriptors: Headache       Prior Level of Function:  Social/Functional History  Lives With: Spouse  Type of Home: House  Home Layout: Multi-level,Bed/Bath upstairs (6 steps with handrail, full bath on main and upper level)  Home Access: Stairs to enter without rails  Entrance Stairs - Number of Steps: 2  Bathroom Shower/Tub: Walk-in shower,Tub/Shower unit  Bathroom Equipment:  (none)  Home Equipment:  (none)  ADL Assistance: Independent  Homemaking Assistance: Independent  Homemaking Responsibilities: Yes  Ambulation Assistance: Independent (none)  Transfer Assistance: Independent  Active : Yes    OBJECTIVE:     Orientation Status:  Orientation  Overall Orientation Status: Within Normal Limits    Observation:  Observation/Palpation  Posture: Good  Observation: pleasant, cooperative, no acute distress noted, on RA, BP supine 103/52mmHg (68), seated 121/81 mmHg (94), standing 117/68 mmHg (80)    Cognition Status:  Cognition  Overall Cognitive Status: WFL    Perception Status:  Perception  Overall Perceptual Status: WFL    Sensation Status:  Sensation  Overall Sensation Status: WFL    Vision and Hearing Status:  Vision  Vision: Impaired  Vision Exceptions: Wears glasses at all times  Hearing  Hearing: Within functional limits     ROM:   LUE AROM (degrees)  LUE AROM : WFL  Left Hand AROM (degrees)  Left Hand AROM: WFL  RUE AROM (degrees)  RUE AROM : WFL  Right Hand AROM (degrees)  Right Hand AROM: WFL    Strength:  LUE Strength  Gross LUE Strength: WFL  L Hand General: 4/5  LUE Strength Comment: 4/5 all planes  RUE Strength  Gross RUE Strength: WFL  R Hand General: 4/5  RUE Strength Comment: 4/5 all planes    Coordination, Tone, Quality of Movement: Tone RUE  RUE Tone: Normotonic  Tone LUE  LUE Tone: Normotonic  Coordination  Movements Are Fluid And Coordinated: Yes    Hand Dominance:  Hand Dominance  Hand Dominance: Right    ADL Status:  ADL  Feeding: Independent  Grooming: Supervision  UE Bathing: Supervision  LE Bathing: Supervision  UE Dressing: Supervision  LE Dressing: Supervision  Toileting: Supervision  Additional Comments: Simulated ADLs as above. No LOB or difficulty reaching feet or managing pants.  Supervision d/t seizure precautions  Toilet Transfers  Toilet Transfer: Unable to assess  Toilet Transfers Comments: Anticipate supervision based on other mobility; in insolation, unable to assess     Therapy key for assistance levels -   Independent = Pt. is able to perform task with no assistance but may require a device   Stand by assistance = Pt. does not perform task at an independent level but does not need physical assistance, requires verbal cues  Minimal, Moderate, Maximal Assistance = Pt. requires physical assistance (25%, 50%, 75% assist from helper) for task but is able to actively participate in task   Dependent = Pt. help for Bathing?: A Little  How much help for Toileting?: A Little  How much help for putting on and taking off regular upper body clothing?: A Little  How much help for taking care of personal grooming?: A Little  How much help for eating meals?: None  AM-PAC Inpatient Daily Activity Raw Score: 19  AM-PAC Inpatient ADL T-Scale Score : 40.22  ADL Inpatient CMS 0-100% Score: 42.8    Plan:  Plan  Times per week: 1-4x  Plan weeks: Length of acute stay  Current Treatment Recommendations: Balance Training,Functional Mobility Training,Endurance Training,Safety Education & Training,Equipment Evaluation, Education, & procurement,Self-Care / ADL,Patient/Caregiver Education & Training,Home Management Training    Goals:   Patient will:    - Improve functional endurance to tolerate/complete 30 mins of ADL's  - Be Independent in UB ADLs   - Be Independent in LB ADLs  - Be Independent in ADL transfers without LOB  - Be Independent in toileting tasks  - Access appropriate D/C site with as few architectural barriers as possible. - Sequence self-care tasks with no verbal cues for energy conservation    Patient Goal: Patient goals : \"I want to get home\"     Discussed and agreed upon: Yes Comments:     Therapy Time:   OT Individual Minutes  Time In: 1530  Time Out: 1547  Minutes: 17    Eval: 17 minutes     Electronically signed by:     Annika Gilmore OTR/JENNIFER, OTMADISYN/L  12/30/2021, 4:42 PM

## 2021-12-31 VITALS
DIASTOLIC BLOOD PRESSURE: 46 MMHG | TEMPERATURE: 98.6 F | SYSTOLIC BLOOD PRESSURE: 98 MMHG | HEIGHT: 65 IN | WEIGHT: 131 LBS | RESPIRATION RATE: 16 BRPM | OXYGEN SATURATION: 98 % | BODY MASS INDEX: 21.83 KG/M2 | HEART RATE: 67 BPM

## 2021-12-31 PROCEDURE — 6370000000 HC RX 637 (ALT 250 FOR IP): Performed by: INTERNAL MEDICINE

## 2021-12-31 PROCEDURE — 2580000003 HC RX 258: Performed by: INTERNAL MEDICINE

## 2021-12-31 PROCEDURE — G0378 HOSPITAL OBSERVATION PER HR: HCPCS

## 2021-12-31 PROCEDURE — 6370000000 HC RX 637 (ALT 250 FOR IP): Performed by: PSYCHIATRY & NEUROLOGY

## 2021-12-31 PROCEDURE — 6360000002 HC RX W HCPCS: Performed by: INTERNAL MEDICINE

## 2021-12-31 PROCEDURE — 2580000003 HC RX 258: Performed by: STUDENT IN AN ORGANIZED HEALTH CARE EDUCATION/TRAINING PROGRAM

## 2021-12-31 PROCEDURE — 96372 THER/PROPH/DIAG INJ SC/IM: CPT

## 2021-12-31 RX ORDER — LEVETIRACETAM 500 MG/1
500 TABLET ORAL 2 TIMES DAILY
Qty: 60 TABLET | Refills: 3 | Status: SHIPPED | OUTPATIENT
Start: 2021-12-31 | End: 2022-05-18

## 2021-12-31 RX ORDER — 0.9 % SODIUM CHLORIDE 0.9 %
1000 INTRAVENOUS SOLUTION INTRAVENOUS ONCE
Status: COMPLETED | OUTPATIENT
Start: 2021-12-31 | End: 2021-12-31

## 2021-12-31 RX ADMIN — SODIUM CHLORIDE 1000 ML: 9 INJECTION, SOLUTION INTRAVENOUS at 10:22

## 2021-12-31 RX ADMIN — LEVETIRACETAM 500 MG: 500 TABLET, FILM COATED ORAL at 10:21

## 2021-12-31 RX ADMIN — ENOXAPARIN SODIUM 40 MG: 100 INJECTION SUBCUTANEOUS at 10:21

## 2021-12-31 RX ADMIN — GUAIFENESIN 600 MG: 600 TABLET ORAL at 10:21

## 2021-12-31 RX ADMIN — Medication 10 ML: at 10:22

## 2021-12-31 NOTE — ACP (ADVANCE CARE PLANNING)
Advance Care Planning     Advance Care Planning Activator (Inpatient)  Conversation Note      Date of ACP Conversation: 12/31/2021     Conversation Conducted with: Patient with Decision Making Capacity    ACP Activator: Rm Espinal RN      Health Care Decision Maker:     Current Designated Health Care Decision Maker:     Primary Decision Maker: Carlos Hidalgo - [phone]  Click here to complete Healthcare Decision Makers including section of the Healthcare Decision Maker Relationship (ie \"Primary\")  Today we documented Decision Maker(s) consistent with Legal Next of Kin hierarchy. Care Preferences    Ventilation: \"If you were in your present state of health and suddenly became very ill and were unable to breathe on your own, what would your preference be about the use of a ventilator (breathing machine) if it were available to you? \"      Would the patient desire the use of ventilator (breathing machine)?: yes    \"If your health worsens and it becomes clear that your chance of recovery is unlikely, what would your preference be about the use of a ventilator (breathing machine) if it were available to you? \"     Would the patient desire the use of ventilator (breathing machine)?: Yes      Resuscitation  \"CPR works best to restart the heart when there is a sudden event, like a heart attack, in someone who is otherwise healthy. Unfortunately, CPR does not typically restart the heart for people who have serious health conditions or who are very sick. \"    \"In the event your heart stopped as a result of an underlying serious health condition, would you want attempts to be made to restart your heart (answer \"yes\" for attempt to resuscitate) or would you prefer a natural death (answer \"no\" for do not attempt to resuscitate)? \" yes       [] Yes   [] No   Educated Patient / Renee Dior regarding differences between Advance Directives and portable DNR orders.     Length of ACP Conversation in minutes: Conversation Outcomes:  [] ACP discussion completed  [] Existing advance directive reviewed with patient; no changes to patient's previously recorded wishes  [] New Advance Directive completed  [] Portable Do Not Rescitate prepared for Provider review and signature  [] POLST/POST/MOLST/MOST prepared for Provider review and signature      Follow-up plan:    [] Schedule follow-up conversation to continue planning  [] Referred individual to Provider for additional questions/concerns   [] Advised patient/agent/surrogate to review completed ACP document and update if needed with changes in condition, patient preferences or care setting    [] This note routed to one or more involved healthcare providers

## 2021-12-31 NOTE — DISCHARGE SUMMARY
Labs:  Admission on 12/29/2021Prolactin                                     Date: 12/29/2021Value: 30.9        Ref range: ng/mL              Status: Final              Comment: Default Normal RangesFemale                     MaleNonpregnant: 4.8-23.3     4.0-15.2Pro-BNP                                       Date: 12/29/2021Value: 54          Ref range: pg/mL              Status: Final              Comment: NT-pro BNP ACUTE Interpretive Guidelines:      Age        Cutoff for Heart Failure   Less than 50 yrs   450 pg/mL   50-75 yrs           900 pg/mL   Greater than 75 yrs  1800 pg/mLNT-pro BNP NON-ACUTE Interpretive Guidelines:    Age                 Reference Range  Less than 74 yrs   0-125 pg/mL  Greater than 74 yrs   0-450 pg/mLOther possible causes of an elevated NT-proBNP include:cardiac ischemia, acute coronary syndrome, COPD, pneumonia,atrial fibrillation, pulmonary emboli, pulmonary hypertension,pericarditisReference:DARRON Al et al. NT-proBNP testing for diagnosis andshort-term prognosis in acute destabilized HF: an internationalpooled analysis of 1256 patients.   Heart Journal.2006;27:330-337Sodium                                        Date: 12/29/2021Value: 137         Ref range: 135 - 144 mEq/L    Status: FinalPotassium                                     Date: 12/29/2021Value: 3.9         Ref range: 3.4 - 4.9 mEq/L    Status: FinalChloride                                      Date: 12/29/2021Value: 101         Ref range: 95 - 107 mEq/L     Status: FinalCO2                                           Date: 12/29/2021Value: 25          Ref range: 20 - 31 mEq/L      Status: FinalAnion Gap                                     Date: 12/29/2021Value: 11          Ref range: 9 - 15 mEq/L       Status: FinalGlucose                                       Date: 12/29/2021Value: 135*        Ref range: 70 - 99 mg/dL      Status: FinalBUN                                           Date: 12/29/2021Value: 20 Ref range: 8 - 23 mg/dL       Status: FinalCREATININE                                    Date: 12/29/2021Value: 1.01*       Ref range: 0.50 - 0.90 mg/dL  Status: FinalGFR Non-                      Date: 12/29/2021Value: 54.9*       Ref range: >60                Status: Final              Comment: >60 mL/min/1.73m2 EGFR, calc. for ages 25 and older using theMDRD formula (not corrected for weight), is valid for stablerenal function. GFR                           Date: 12/29/2021Value: >60.0       Ref range: >60                Status: Final              Comment: >60 mL/min/1.73m2 EGFR, calc. for ages 25 and older using theMDRD formula (not corrected for weight), is valid for stablerenal function. Calcium                                       Date: 12/29/2021Value: 9.2         Ref range: 8.5 - 9.9 mg/dL    Status: FinalTotal Protein                                 Date: 12/29/2021Value: 7.1         Ref range: 6.3 - 8.0 g/dL     Status: FinalAlbumin                                       Date: 12/29/2021Value: 3.8         Ref range: 3.5 - 4.6 g/dL     Status: FinalTotal Bilirubin                               Date: 12/29/2021Value: 0.4         Ref range: 0.2 - 0.7 mg/dL    Status: FinalAlkaline Phosphatase                          Date: 12/29/2021Value: 85          Ref range: 40 - 130 U/L       Status: FinalALT                                           Date: 12/29/2021Value: 17          Ref range: 0 - 33 U/L         Status: FinalAST                                           Date: 12/29/2021Value: 25          Ref range: 0 - 35 U/L         Status: FinalGlobulin                                      Date: 12/29/2021Value: 3.3         Ref range: 2.3 - 3.5 g/dL     Status: FinalBlood Culture, Routine                        Date: 12/29/2021Value: No Growth to date.   Any change in status will be *                     Status: PreliminaryCulture, Blood 2                              Date: 12/29/2021Value: No Growth to date. Any change in status will be *                     Status: PreliminaryGlucose                                       Date: 12/29/2021Value: 113         Ref range: mg/dL              Status: FinalQC OK?                                         Date: 12/29/2021Value: pass          Status: [address] [facility]                                           Date: 12/29/2021Value: 5.5         Ref range: 4.8 - 10.8 K/uL    Status: FinalRBC                                           Date: 12/29/2021Value: 3.83*       Ref range: 4.20 - 5.40 M/uL   Status: FinalHemoglobin                                    Date: 12/29/2021Value: 11.7*       Ref range: 12.0 - 16.0 g/dL   Status: FinalHematocrit                                    Date: 12/29/2021Value: 35.1*       Ref range: 37.0 - 47.0 %      Status: FinalMCV                                           Date: 12/29/2021Value: 91.7        Ref range: 82.0 - 100.0 fL    Status: [address]                                           Date: 12/29/2021Value: 30.5        Ref range: 27.0 - 31.3 pg     Status: [address]                                          Date: 12/29/2021Value: 33.3        Ref range: 33.0 - 37.0 %      Status: FinalRDW                                           Date: 12/29/2021Value: 13.2        Ref range: 11.5 - 14.5 %      Status: FinalPlatelets                                     Date: 12/29/2021Value: 172         Ref range: 130 - 400 K/uL     Status: FinalNeutrophils %                                 Date: 12/29/2021Value: 80.4        Ref range: %                  Status: FinalLymphocytes %                                 Date: 12/29/2021Value: 12.0        Ref range: %                  Status: FinalMonocytes %                                   Date: 12/29/2021Value: 7.4         Ref range: %                  Status: FinalEosinophils %                                 Date: 12/29/2021Value: 0.1         Ref range: %                  Status: FinalBasophils %                                   Date: 12/29/2021Value: 0.1         Ref range: %                  Status: FinalNeutrophils Absolute                          Date: 12/29/2021Value: 4.5         Ref range: 1.4 - 6.5 K/uL     Status: FinalLymphocytes Absolute                          Date: 12/29/2021Value: 0.7*        Ref range: 1.0 - 4.8 K/uL     Status: FinalMonocytes Absolute                            Date: 12/29/2021Value: 0.4         Ref range: 0.2 - 0.8 K/uL     Status: FinalEosinophils Absolute                          Date: 12/29/2021Value: 0.0         Ref range: 0.0 - 0.7 K/uL     Status: FinalBasophils Absolute                            Date: 12/29/2021Value: 0.0         Ref range: 0.0 - 0.2 K/uL     Status: FinalLactic Acid                                   Date: 12/29/2021Value: 1.2         Ref range: 0.5 - 2.2 mmol/L   Status: FinalMagnesium                                     Date: 12/29/2021Value: 2.4         Ref range: 1.7 - 2.4 mg/dL    Status: FinalTroponin                                      Date: 12/29/2021Value: <0.010      Ref range: 0.000 - 0.010 ng*  Status: Final              Comment: Methodology by Troponin TYadira Color, UA                                     Date: 12/29/2021Value: Yellow      Ref range: Straw/Yellow       Status: FinalClarity, UA                                   Date: 12/29/2021Value: Clear       Ref range: Clear              Status: FinalGlucose, Ur                                   Date: 12/29/2021Value: Negative    Ref range: Negative mg/dL     Status: FinalBilirubin Urine                               Date: 12/29/2021Value: Negative    Ref range: Negative           Status: FinalKetones, Urine                                Date: 12/29/2021Value: Negative    Ref range: Negative mg/dL     Status: FinalSpecific Gravity, UA                          Date: 12/29/2021Value: 1.023       Ref range: 1.005 - 1.030      Status: FinalBlood, Urine Date: 12/29/2021Value: Negative    Ref range: Negative           Status: FinalpH, UA                                        Date: 12/29/2021Value: 6.0         Ref range: 5.0 - 9.0          Status: FinalProtein, UA                                   Date: 12/29/2021Value: Negative    Ref range: Negative mg/dL     Status: FinalUrobilinogen, Urine                           Date: 12/29/2021Value: 0.2         Ref range: <2.0 E.U./dL       Status: FinalNitrite, Urine                                Date: 12/29/2021Value: Negative    Ref range: Negative           Status: FinalLeukocyte Esterase, Urine                     Date: 12/29/2021Value: Negative    Ref range: Negative           Status: FinalUrine Reflex to Culture                       Date: 12/29/2021Value: Not Indicated                     Status: FinalaPTT                                          Date: 12/29/2021Value: 38.2*       Ref range: 24.4 - 36.8 sec    Status: Final              Comment: Effective 11/4/2020:Heparin Therapeutic Range: 64.0 - 98.0 seconds. Protime                                       Date: 12/29/2021Value: 13.2        Ref range: 12.3 - 14.9 sec    Status: FinalINR                                           Date: 12/29/2021Value: 1.0           Status: [id number]-2, NAAT                              Date: 12/29/2021Value: DETECTED*   Ref range: Not Detected       Status: Final              Comment: Rapid NAAT:   Negative results should be treated as presumptive and,if inconsistent with clinical signs and symptoms or necessary forpatient management, should be tested with an alternative molecularassay. Negative results do not preclude SARS-CoV-2 infection andshould not be used as the sole basis for patient management decisions. This test has been authorized by the FDA under an Emergency UseAuthorization (EUA) for use by authorized laboratories. Fact sheet for [employer] sheet for Patients: Jess.dk: Isothermal Nucleic Acid AmplificationInfluenza A by PCR                            Date: 12/29/2021Value: Negative      Status: Katie Tia B by PCR                            Date: 12/29/2021Value: Negative      Status: FinalVentricular Rate                              Date: 12/29/2021Value: 70          Ref range: BPM                Status: FinalAtrial Rate                                   Date: 12/29/2021Value: 70          Ref range: BPM                Status: FinalP-R Interval                                  Date: 12/29/2021Value: 152         Ref range: ms                 Status: FinalQRS Duration                                  Date: 12/29/2021Value: 78          Ref range: ms                 Status: FinalQ-T Interval                                  Date: 12/29/2021Value: 406         Ref range: ms                 Status: FinalQTc Calculation (Bazett)                      Date: 12/29/2021Value: 438         Ref range: ms                 Status: FinalP Axis                                        Date: 12/29/2021Value: 74          Ref range: degrees            Status: FinalR Axis                                        Date: 12/29/2021Value: 40          Ref range: degrees            Status: FinalT Axis                                        Date: 12/29/2021Value: 38          Ref range: degrees            Status: FinalPOC CREATININE WHOLE BLOOD                    Date: 12/29/2021Value: 1.0           Status: FinalProcalcitonin                                 Date: 12/29/2021Value: 0.07        Ref range: 0.00 - 0.15 ng/mL  Status: Final              Comment: Suspected Sepsis:Low likelihood of sepsis  <.50 ng/mLIncreased likelihood of sepsis 0.50-2.00 ng/mLAntibiotics encouragedHigh risk of sepsis/shock   >2.00 ng/mLAntibiotics strongly encouragedSuspected Lower Respiratory Tract Infections:Low likelihood of bacterial infection  <0.24 ng/mLIncreased likelihood of bacterial infection >0.24 ng/mLAntibiotics encouragedWith successful antibiotic therapy, PCT levels should decreaserapidly. (Half-life of 24 to 36 hours. )Procalcitonin values from samples collected within the first6 hours of systemic infection may still be low. Retesting may be indicated. Values from day 1 and day 4 can be entered into the Change inProcalcitonin Calculator to determine the patient'sMortality Risk http://www.yu.info/. com)In healthy neonates, plasma Procalcitonin (PCT) concentrationsincrease gradually after birth, reaching peak values at about24 hours of age then decrease to normal values below 0.5                        ng/mLby 48-72 hours of age. POC Glucose                                   Date: 12/29/2021Value: 113         Ref range: 60 - 115 mg/dl     Status: FinalPerformed on                                  Date: 12/29/2021Value: ACCU-CHEK     Status: FinalD-Dimer, Quant                                Date: 12/29/2021Value: <0.27       Ref range: 0.00 - 0.50 mg/L*  Status: Final              Comment: VTE (DVT or PE) cut-off = 0.50 mg/L FEUPOC Creatinine                                Date: 12/29/2021Value: 1.0         Ref range: 0.6 - 1.2 mg/dL    Status: FinalGFR Non-                      Date: 12/29/2021Value: 64*         Ref range: >60                Status: Final              Comment: >60 mL/min/1.73m2 EGFR, calc. for ages 25 and older using theMDRD formula (not corrected for weight), is valid for stablerenal function. GFR                           Date: 12/29/2021Value: >60         Ref range: >60                Status: Final              Comment: >60 mL/min/1.73m2 EGFR, calc. for ages 25 and older using theMDRD formula (not corrected for weight), is valid for stablerenal function. Sample Type                                   Date: 12/29/2021Value: SALO           Status: FinalPerformed on                                  Date: 12/29/2021Value: SEE BELOW     Status: Final              Comment: Performed on POCTotal CK                                      Date: 12/29/2021Value: 65          Ref range: 0 - 170 U/L        Status: FinalSodium                                        Date: 12/30/2021Value: 132*        Ref range: 135 - 144 mEq/L    Status: FinalPotassium reflex Magnesium                    Date: 12/30/2021Value: 3.8         Ref range: 3.4 - 4.9 mEq/L    Status: FinalChloride                                      Date: 12/30/2021Value: 101         Ref range: 95 - 107 mEq/L     Status: FinalCO2                                           Date: 12/30/2021Value: 24          Ref range: 20 - 31 mEq/L      Status: FinalAnion Gap                                     Date: 12/30/2021Value: 7*          Ref range: 9 - 15 mEq/L       Status: FinalGlucose                                       Date: 12/30/2021Value: 96          Ref range: 70 - 99 mg/dL      Status: FinalBUN                                           Date: 12/30/2021Value: 13          Ref range: 8 - 23 mg/dL       Status: FinalCREATININE                                    Date: 12/30/2021Value: 0.74        Ref range: 0.50 - 0.90 mg/dL  Status: FinalGFR Non-                      Date: 12/30/2021Value: >60.0       Ref range: >60                Status: Final              Comment: >60 mL/min/1.73m2 EGFR, calc. for ages 25 and older using theMDRD formula (not corrected for weight), is valid for stablerenal function. GFR                           Date: 12/30/2021Value: >60.0       Ref range: >60                Status: Final              Comment: >60 mL/min/1.73m2 EGFR, calc. for ages 25 and older using theMDRD formula (not corrected for weight), is valid for stablerenal function. Calcium                                       Date: 12/30/2021Value: 8.3*        Ref range: 8.5 - 9.9 mg/dL    Status: FinalTotal Protein                                 Date: 12/30/2021Value: 5.9* Ref range: 6.3 - 8.0 g/dL     Status: FinalAlbumin                                       Date: 12/30/2021Value: 3.2*        Ref range: 3.5 - 4.6 g/dL     Status: FinalTotal Bilirubin                               Date: 12/30/2021Value: 0.3         Ref range: 0.2 - 0.7 mg/dL    Status: FinalAlkaline Phosphatase                          Date: 12/30/2021Value: 65          Ref range: 40 - 130 U/L       Status: FinalALT                                           Date: 12/30/2021Value: 14          Ref range: 0 - 33 U/L         Status: FinalAST                                           Date: 12/30/2021Value: 19          Ref range: 0 - 35 U/L         Status: FinalGlobulin                                      Date: 12/30/2021Value: 2.7         Ref range: 2.3 - 3.5 g/dL     Status: [address] [facility]                                           Date: 12/30/2021Value: 5.7         Ref range: 4.8 - 10.8 K/uL    Status: FinalRBC                                           Date: 12/30/2021Value: 3.31*       Ref range: 4.20 - 5.40 M/uL   Status: FinalHemoglobin                                    Date: 12/30/2021Value: 10.1*       Ref range: 12.0 - 16.0 g/dL   Status: FinalHematocrit                                    Date: 12/30/2021Value: 30.3*       Ref range: 37.0 - 47.0 %      Status: FinalMCV                                           Date: 12/30/2021Value: 91.7        Ref range: 82.0 - 100.0 fL    Status: [address] [employer] [place]                                           Date: 12/30/2021Value: 30.4        Ref range: 27.0 - 31.3 pg     Status: [address]                                          Date: 12/30/2021Value: 33.2        Ref range: 33.0 - 37.0 %      Status: FinalRDW                                           Date: 12/30/2021Value: 13.3        Ref range: 11.5 - 14.5 %      Status: FinalPlatelets                                     Date: 12/30/2021Value: Not Done    Ref range: 130 - 400 K/uL     Status: Final              Comment: Platelet clumps seen on slide reviewPLATELET SLIDE REVIEW                         Date: 12/30/2021Value: Clumped       Status: FinalNeutrophils %                                 Date: 12/30/2021Value: 74.4        Ref range: %                  Status: FinalLymphocytes %                                 Date: 12/30/2021Value: 17.7        Ref range: %                  Status: FinalMonocytes %                                   Date: 12/30/2021Value: 7.4         Ref range: %                  Status: FinalEosinophils %                                 Date: 12/30/2021Value: 0.1         Ref range: %                  Status: FinalBasophils %                                   Date: 12/30/2021Value: 0.4         Ref range: %                  Status: FinalNeutrophils Absolute                          Date: 12/30/2021Value: 4.2         Ref range: 1.4 - 6.5 K/uL     Status: FinalLymphocytes Absolute                          Date: 12/30/2021Value: 1.0         Ref range: 1.0 - 4.8 K/uL     Status: FinalMonocytes Absolute                            Date: 12/30/2021Value: 0.4         Ref range: 0.2 - 0.8 K/uL     Status: FinalEosinophils Absolute                          Date: 12/30/2021Value: 0.0         Ref range: 0.0 - 0.7 K/uL     Status: FinalBasophils Absolute                            Date: 12/30/2021Value: 0.0         Ref range: 0.0 - 0.2 K/uL     Status: FinalRBC Morphology                                Date: 12/30/2021Value: Normal        Status: FinalLD                                            Date: 12/30/2021Value: 254*        Ref range: 135 - 214 U/L      Status: FinalCRP                                           Date: 12/30/2021Value: 52.8*       Ref range: 0.0 - 5.0 mg/L     Status: FinalRejected Test                                 Date: 12/30/2021Value: DIMER         Status: Alvin Doe for Rejection                          Date: 12/30/2021Value: see below     Status: Final              Comment: Unable to perform testing; specimen clotted. To perform testing the specimen will need to be recollected. ClottedD-Dimer, Quant                                Date: 12/30/2021Value: 0.30        Ref range: 0.00 - 0.50 mg/L*  Status: Final              Comment: VTE (DVT or PE) cut-off = 0.50 mg/L FEU------------    Radiology last 7 days:  CT HEAD WO CONTRASTResult Date: 12/29/2021Impression: Negative CT of the brain. All CT scans at this facility use dose modulation, iterative reconstruction, and/or weight based dosing when appropriate to reduce radiation dose to as low as reasonably achievable. CTA Chest W WO  (PE study)Result Date: 12/29/2021No CT evidence pulmonary embolism. Bilateral, multifocal, peripheral, pulmonary groundglass opacities. These findings may be seen in patients with covid 19 pneumonia. However, other infectious and inflammatory etiologies may result in a similar radiographic appearance. All CT scans at this facility use dose modulation, iterative reconstruction, and/or weight based dosing when appropriate to reduce radiation dose to as low as reasonably achievable. CT CERVICAL SPINE WO CONTRASTResult Date: 12/29/2021No fracture. Degenerative change, lower cervical spine All CT scans at this facility use dose modulation, iterative reconstruction, and/or weight based dosing when appropriate to reduce radiation dose to as low as reasonably achievable. Angeli Duncan MRI BRAIN W WO CONTRASTResult Date: 12/29/2021NO ACUTE INTRACRANIAL PROCESS OR EVIDENCE OF VASCULAR MALFORMATION IDENTIFIED.       Pending Labs   Order Current Status  Culture, Blood 1 Preliminary result  Culture, Blood 2 Preliminary result      Discharge Medications    Current Discharge Medication ListSTART taking these medicationsapixaban (ELIQUIS) 2.5 MG TABS tabletTake 1 tablet by mouth 2 times dailyQty: 60 tablet Refills: 0levETIRAcetam (KEPPRA) 500 MG tabletTake 1 tablet by mouth 2 times dailyQty: 60 tablet Refills: 3    Current Discharge Medication List    Current Discharge Medication ListCONTINUE these medications which have NOT CHANGEDbutalbital-acetaminophen-caffeine (FIORICET, ESGIC) -40 MG per tabletTake 1 tablet by mouth every 6 hours as needed for HeadachesQty: 30 tablet Refills: 2Associated Diagnoses:Chronic nonintractable headache, unspecified headache typecelecoxib (CELEBREX) 200 MG capsuleTake 1 capsule by mouth dailyQty: 30 capsule Refills: 5Associated Diagnoses:Chronic nonintractable headache, unspecified headache typecetirizine (ZYRTEC ALLERGY) 10 MG tabletTake 1 tablet by mouth dailyQty: 30 tablet Refills: 3Associated Diagnoses:Other seasonal allergic rhinitis    Current Discharge Medication List    Time Spent on Discharge:3E] minutes were spent in patient examination, evaluation, counseling as well as medication reconciliation, prescriptions for required medications, discharge plan, and follow up.     Electronically signed by Julien Najjar, DO on 12/31/21 at 9:41 AM EST

## 2021-12-31 NOTE — CARE COORDINATION
[facility] Case Management Initial Discharge Assessment    Met with Patient to discuss discharge plan. SPOKE WITH PATIENT VIA TELEPHONE, PT IN COVID ISOLATION. PCP: CHAU Hauser CNP                                Date of Last Visit: ABOUT 1 MO AGO    If no PCP, list provided? N/A    Discharge Planning    Living Arrangements: independently at home    Who do you live with? SPOUSE    Who helps you with your care:  self    If lives at home:     Do you have any barriers navigating in your home? no    Patient can perform ADL? Yes    Current Services (outpatient and in home) :  None    Dialysis: No    Is transportation available to get to your appointments? Yes    DME Equipment:  no    Respiratory equipment: None    Respiratory provider:  no     Pharmacy:  yes - [facility]    Consult with Medication Assistance Program?  No      Patient agreeable to [place] ? Declined    Patient agreeable to SNF/Rehab? Declined    Other discharge needs identified? N/A    Does Patient Have a High-Risk for Readmission Diagnosis (CHF, PN, MI, COPD)? No    Initial Discharge Plan? (Note: please see concurrent daily documentation for any updates after initial note). RETURN HOME WITH SPOUSE, DENIES NEEDS. ELIQUIS CARD PROVIDED FOR NEW RX. PLAN WAS DISCUSSED W/DR. PARRY    Readmission Risk              Risk of Unplanned Readmission:  8         Electronically signed by Denise Moore RN on 12/31/2021 at 10:39 AM

## 2022-01-02 NOTE — PROGRESS NOTES
Physical Therapy  Facility/Department: [facility] N142/[id number]  Physical Therapy Discharge      NAME: Jade Maldonado    :  (73 y.o.)  MRN: [id number]    Account: [de-identified]  Gender: female      Patient has been discharged from acute care hospital. DC patient from current PT program.      Electronically signed by Miguel Naranjo PT on 22 at 12:15 PM EST

## 2022-01-03 ENCOUNTER — CARE COORDINATION (OUTPATIENT)
Dept: CARE COORDINATION | Age: 67
End: 2022-01-03

## 2022-01-03 DIAGNOSIS — R56.9 SEIZURE (HCC): Primary | ICD-10-CM

## 2022-01-03 LAB
BLOOD CULTURE, ROUTINE: NORMAL
CULTURE, BLOOD 2: NORMAL

## 2022-01-03 PROCEDURE — 1111F DSCHRG MED/CURRENT MED MERGE: CPT | Performed by: NURSE PRACTITIONER

## 2022-01-03 NOTE — CARE COORDINATION
Angelina [facility] Transitions Initial Follow Up Call    Call within 2 business days of discharge: Yes    Patient: Jade Maldonado Patient :    MRN: [id number]  Reason for Admission: -21 Seizure; Covid-19  Discharge Date: 21 RARS: Readmission Risk Score: 8.7 ( )      Last Discharge [facility]       Complaint Diagnosis Description Type Department Provider    21 flu like symptoms Seizure ([facility] [address] . .. ED to Hosp-Admission (Discharged) (ADMITTED) [address],         Pt returned CTN call. Venecia reports non productive cough, fatigue, and decreased appetite. Pt denies SOB, fever, chills, n/v, lightheadedness, dizziness, neck pain, or headache. Pt encouraged to eat nutritious meals and to drink plenty of fluids. Pt v/u. Medication Review completed, 1111F order placed. PCP Hosp F/U scheduled 22. Pt denies Transportation, Home, or Medication needs. CTN information given, will continue to monitor. Date/Time:  1/3/2022 1:20 PM  Attempted to reach patient by telephone for Initial Covid Care Transitions call. Call within 2 business days of discharge: Yes Left HIPPA compliant message requesting a return call. Will attempt to reach patient again. HANNY Lozano / [address]  [phone]    Facility: [facility]    Patient contacted regarding COVID-19 diagnosis. Discussed COVID-19 related testing which was available at this time. Test results were positive. Patient informed of results, if available? Yes. Care Transition Nurse contacted the patient by telephone to perform post discharge assessment. Call within 2 business days of discharge: Yes. Verified name and  with patient as identifiers. Provided introduction to self, and explanation of the CTN/ACM role, and reason for call due to risk factors for infection and/or exposure to COVID-19.      Symptoms reviewed with patient who verbalized the following symptoms: fatigue, cough, no new symptoms and no worsening symptoms. Due to no new or worsening symptoms encounter was not routed to provider for escalation. Discussed follow-up appointments. If no appointment was previously scheduled, appointment scheduling offered: Yes. [facility] follow up appointment(s):   Future Appointments   Date Time Provider Estefanía Shields   1/6/2022  1:00 PM [facility] AT [place]   4/25/2022 10:15 AM CHAU Maravilla CNP [facility] EMERGENCY [facility] AT [place]     [facility] follow up appointment(s):     Non-face-to-face services provided:  Obtained and reviewed discharge summary and/or continuity of care documents     Advance Care Planning:   Does patient have an Advance Directive:  not on file. Educated patient about risk for severe COVID-19 due to risk factors according to CDC guidelines. CTN reviewed discharge instructions, medical action plan and red flag symptoms with the patient who verbalized understanding. Discussed COVID vaccination status: Yes. Education provided on COVID-19 vaccination as appropriate. Discussed exposure protocols and quarantine with CDC Guidelines. Patient was given an opportunity to verbalize any questions and concerns and agrees to contact CTN or health care provider for questions related to their healthcare. Reviewed and educated patient on any new and changed medications related to discharge diagnosis     Was patient discharged with a pulse oximeter? No Discussed and confirmed pulse oximeter discharge instructions and when to notify provider or seek emergency care. CTN provided contact information. Plan for follow-up call in 5-7 days based on severity of symptoms and risk factors.   Care Transitions 24 Hour Call    Do you have any ongoing symptoms?: Yes  Patient-reported symptoms: Cough, Fatigue  Do you have a copy of your discharge instructions?: Yes  Do you have all of your prescriptions and are they filled?: Yes  Have you been contacted by a [facility] Pharmacist?: No  Have you scheduled your follow up appointment?: Yes  How are you going to get to your appointment?: Car - family or friend to transport  Were you discharged with any Home Care or Post Acute Services: No  Do you feel like you have everything you need to keep you well at home?: Yes  Care Transitions Interventions  No Identified Needs         Follow Up  Future Appointments   Date Time Provider Estefanía Shields   1/6/2022  1:00 PM CHAU Dorantes CNP [facility] EMERGENCY [facility] AT [place]   4/25/2022 10:15 AM CHAU Dorantes CNP [facility]

## 2022-01-06 ENCOUNTER — OFFICE VISIT (OUTPATIENT)
Dept: FAMILY MEDICINE CLINIC | Age: 67
End: 2022-01-06
Payer: MEDICARE

## 2022-01-06 VITALS
WEIGHT: 131 LBS | SYSTOLIC BLOOD PRESSURE: 112 MMHG | HEIGHT: 65 IN | HEART RATE: 72 BPM | OXYGEN SATURATION: 100 % | BODY MASS INDEX: 21.83 KG/M2 | DIASTOLIC BLOOD PRESSURE: 62 MMHG | TEMPERATURE: 96.8 F

## 2022-01-06 DIAGNOSIS — U07.1 COVID-19: ICD-10-CM

## 2022-01-06 DIAGNOSIS — Z09 HOSPITAL DISCHARGE FOLLOW-UP: ICD-10-CM

## 2022-01-06 DIAGNOSIS — R53.83 FATIGUE, UNSPECIFIED TYPE: ICD-10-CM

## 2022-01-06 DIAGNOSIS — R56.9 SEIZURE (HCC): Primary | ICD-10-CM

## 2022-01-06 PROCEDURE — 99496 TRANSJ CARE MGMT HIGH F2F 7D: CPT | Performed by: NURSE PRACTITIONER

## 2022-01-06 PROCEDURE — 1111F DSCHRG MED/CURRENT MED MERGE: CPT | Performed by: NURSE PRACTITIONER

## 2022-01-06 ASSESSMENT — ENCOUNTER SYMPTOMS
SHORTNESS OF BREATH: 0
SINUS PRESSURE: 1
COUGH: 1

## 2022-01-06 NOTE — PROGRESS NOTES
Post-Discharge Transitional Care Management Services or Hospital Follow Up      Carlton Epps   YOB: 1955    Date of Office Visit:  1/6/2022  Date of Hospital Admission: 12/29/21  Date of Hospital Discharge: 12/31/21  Readmission Risk Score(high >=14%. Medium >=10%):Readmission Risk Score: 8.7 ( )      Care management risk score Rising risk (score 2-5) and Complex Care (Scores >=6): 0     Non face to face  following discharge, date last encounter closed (first attempt may have been earlier): 1/3/2022  1:42 PM 1/3/2022  1:42 PM    Call initiated 2 business days of discharge: Yes     Patient Active Problem List   Diagnosis    Seizure ([facility] [address]       Allergies   Allergen Reactions    Pcn [Penicillins] Swelling and Rash       Medications listed as ordered at the time of discharge from hospital     Medication List          Accurate as of January 6, 2022  1:56 PM. If you have any questions, ask your nurse or doctor.             CONTINUE taking these medications    apixaban 2.5 MG Tabs tablet  Commonly known as: Eliquis  Take 1 tablet by mouth 2 times daily     butalbital-acetaminophen-caffeine -40 MG per tablet  Commonly known as: FIORICET, ESGIC  Take 1 tablet by mouth every 6 hours as needed for Headaches     celecoxib 200 MG capsule  Commonly known as: CeleBREX  Take 1 capsule by mouth daily     cetirizine 10 MG tablet  Commonly known as: ZyrTEC Allergy  Take 1 tablet by mouth daily     levETIRAcetam 500 MG tablet  Commonly known as: KEPPRA  Take 1 tablet by mouth 2 times daily              Medications marked \"taking\" at this time  Outpatient Medications Marked as Taking for the 1/6/22 encounter (Office Visit) with CHAU Rodriguez CNP   Medication Sig Dispense Refill    apixaban (ELIQUIS) 2.5 MG TABS tablet Take 1 tablet by mouth 2 times daily 60 tablet 0    levETIRAcetam (KEPPRA) 500 MG tablet Take 1 tablet by mouth 2 times daily 60 tablet 3    butalbital-acetaminophen-caffeine (FIORICET, ESGIC) -40 MG per tablet Take 1 tablet by mouth every 6 hours as needed for Headaches 30 tablet 2    celecoxib (CELEBREX) 200 MG capsule Take 1 capsule by mouth daily 30 capsule 5        Medications patient taking as of now reconciled against medications ordered at time of hospital discharge: Yes    Chief Complaint   Patient presents with   [address] from Hospital     pt states she was sick with covid for a week before going to the hospital.     Seizures    Discuss Medications       HPI    Inpatient course: Discharge summary reviewed- see chart. Interval history/Current status:     Started feeling ill around 12/21. Went to ER after syncopal episode  Was believed to have some potential seizure issues. Dx with covid. Admits to decreased appetite and decreased drinking around that time. Found to have ground glass opacities throughout lungs. Dx with keppra and eliquis. Pt notes that she is feeling somewhat better. Still some sinus pressure. Having a lot of fatigue/weakness still. Review of Systems   Constitutional: Positive for fatigue. HENT: Positive for sinus pressure. Respiratory: Positive for cough. Negative for shortness of breath. Cardiovascular: Negative for chest pain. Vitals:    01/06/22 1302   BP: 112/62   Pulse: 72   Temp: 96.8 °F (36 °C)   SpO2: 100%   Weight: 131 lb (59.4 kg)   Height: 5' 5\" (1.651 m)     Body mass index is 21.8 kg/m². Wt Readings from Last 3 Encounters:   01/06/22 131 lb (59.4 kg)   12/29/21 131 lb (59.4 kg)   12/06/21 131 lb (59.4 kg)     BP Readings from Last 3 Encounters:   01/06/22 112/62   12/31/21 (!) 98/46   12/06/21 122/72       Physical Exam  Vitals and nursing note reviewed. Constitutional:       Appearance: Normal appearance. She is normal weight. HENT:      Head: Normocephalic. Right Ear: A middle ear effusion is present. Left Ear: A middle ear effusion is present. Nose: Congestion present. Mouth/Throat:      Mouth: Mucous membranes are moist.      Pharynx: Oropharynx is clear. Eyes:      Extraocular Movements: Extraocular movements intact. Conjunctiva/sclera: Conjunctivae normal.      Pupils: Pupils are equal, round, and reactive to light. Cardiovascular:      Rate and Rhythm: Normal rate and regular rhythm. Pulses: Normal pulses. Heart sounds: Normal heart sounds. Pulmonary:      Effort: Pulmonary effort is normal.      Breath sounds: Normal breath sounds. Skin:     General: Skin is warm. Neurological:      General: No focal deficit present. Mental Status: She is alert and oriented to person, place, and time. Mental status is at baseline. Psychiatric:         Mood and Affect: Mood normal.         Behavior: Behavior normal.         Thought Content: Thought content normal.         Judgment: Judgment normal.       Assessment/Plan:  1. Seizure [facility])    - Wendy Washington MD,  Neurology [facility] LIST  - cont with keppra for now. 2. COVID-19  - continue to use muccinex. - let me know if symptoms not cont to improve. 3. Fatigue, unspecified type  - discussed that it should cont to improve    4. Hospital discharge follow-up        Side effects, adverse effects of the medication prescribed today, as well as treatment plan/ rationale and result expectations have been discussed with the patient who expresses understanding and desires to proceed. Close follow up to evaluate treatment results and for coordination of care. I have reviewed the patient's medical history in detail and updated the computerized patient record. As always, patient is advised that if symptoms worsen in any way they will proceed to the nearest emergency room.      Medical Decision Making: high complexity

## 2022-01-11 ENCOUNTER — VIRTUAL VISIT (OUTPATIENT)
Dept: FAMILY MEDICINE CLINIC | Age: 67
End: 2022-01-11
Payer: MEDICARE

## 2022-01-11 DIAGNOSIS — U07.1 COVID-19: ICD-10-CM

## 2022-01-11 DIAGNOSIS — M25.50 ARTHRALGIA, UNSPECIFIED JOINT: ICD-10-CM

## 2022-01-11 DIAGNOSIS — R53.83 FATIGUE, UNSPECIFIED TYPE: ICD-10-CM

## 2022-01-11 DIAGNOSIS — R53.83 FATIGUE, UNSPECIFIED TYPE: Primary | ICD-10-CM

## 2022-01-11 LAB
ALBUMIN SERPL-MCNC: 4.2 G/DL (ref 3.5–4.6)
ALP BLD-CCNC: 76 U/L (ref 40–130)
ALT SERPL-CCNC: 19 U/L (ref 0–33)
ANION GAP SERPL CALCULATED.3IONS-SCNC: 10 MEQ/L (ref 9–15)
AST SERPL-CCNC: 12 U/L (ref 0–35)
BASOPHILS ABSOLUTE: 0 K/UL (ref 0–0.2)
BASOPHILS RELATIVE PERCENT: 0.8 %
BILIRUB SERPL-MCNC: <0.2 MG/DL (ref 0.2–0.7)
BUN BLDV-MCNC: 26 MG/DL (ref 8–23)
C-REACTIVE PROTEIN: <3 MG/L (ref 0–5)
CALCIUM SERPL-MCNC: 9.5 MG/DL (ref 8.5–9.9)
CHLORIDE BLD-SCNC: 101 MEQ/L (ref 95–107)
CO2: 27 MEQ/L (ref 20–31)
CREAT SERPL-MCNC: 0.71 MG/DL (ref 0.5–0.9)
EOSINOPHILS ABSOLUTE: 0.1 K/UL (ref 0–0.7)
EOSINOPHILS RELATIVE PERCENT: 1 %
GFR AFRICAN AMERICAN: >60
GFR NON-AFRICAN AMERICAN: >60
GLOBULIN: 3.1 G/DL (ref 2.3–3.5)
GLUCOSE BLD-MCNC: 88 MG/DL (ref 70–99)
HCT VFR BLD CALC: 32.6 % (ref 37–47)
HEMOGLOBIN: 11 G/DL (ref 12–16)
LACTIC ACID: 0.7 MMOL/L (ref 0.5–2.2)
LYMPHOCYTES ABSOLUTE: 1.2 K/UL (ref 1–4.8)
LYMPHOCYTES RELATIVE PERCENT: 19.6 %
MCH RBC QN AUTO: 31.3 PG (ref 27–31.3)
MCHC RBC AUTO-ENTMCNC: 33.7 % (ref 33–37)
MCV RBC AUTO: 92.8 FL (ref 82–100)
MONOCYTES ABSOLUTE: 0.5 K/UL (ref 0.2–0.8)
MONOCYTES RELATIVE PERCENT: 7.7 %
NEUTROPHILS ABSOLUTE: 4.2 K/UL (ref 1.4–6.5)
NEUTROPHILS RELATIVE PERCENT: 70.9 %
PDW BLD-RTO: 13.2 % (ref 11.5–14.5)
PLATELET # BLD: 373 K/UL (ref 130–400)
POTASSIUM SERPL-SCNC: 4.4 MEQ/L (ref 3.4–4.9)
RBC # BLD: 3.51 M/UL (ref 4.2–5.4)
SEDIMENTATION RATE, ERYTHROCYTE: 64 MM (ref 0–30)
SODIUM BLD-SCNC: 138 MEQ/L (ref 135–144)
TOTAL PROTEIN: 7.3 G/DL (ref 6.3–8)
WBC # BLD: 5.9 K/UL (ref 4.8–10.8)

## 2022-01-11 PROCEDURE — 99441 PR PHYS/QHP TELEPHONE EVALUATION 5-10 MIN: CPT | Performed by: NURSE PRACTITIONER

## 2022-01-11 ASSESSMENT — ENCOUNTER SYMPTOMS
COUGH: 0
SHORTNESS OF BREATH: 0

## 2022-01-11 NOTE — PROGRESS NOTES
2022    TELEHEALTH EVALUATION -- Audio/Visual (During RQYXY-55 public health emergency)    Due to Matthfritz 19 outbreak, patient's office visit was converted to a virtual visit. Patient was contacted and agreed to proceed with a virtual visit via telephone call. The risks and benefits of converting to a virtual visit were discussed in light of the current infectious disease epidemic. Patient also understood that insurance coverage and co-pays are up to their individual insurance plans. This visit started at 146    HPI:    Darren Ferraro (:  1955) has requested an audio/video evaluation for the following concern(s):    Pt recently hospitalized with seizures, covid. I did a hospital fu visit last week. Still struggling with fatigue and energy. Having a hard time getting up and moving. Having widespread joint pain. Wondering if this is all normal?     Denies fevers. .     Review of Systems   Constitutional: Positive for fatigue. Respiratory: Negative for cough and shortness of breath. Musculoskeletal: Positive for arthralgias and neck pain. Neurological: Positive for headaches. Prior to Visit Medications    Medication Sig Taking?  Authorizing Provider   apixaban (ELIQUIS) 2.5 MG TABS tablet Take 1 tablet by mouth 2 times daily Yes Ricky Winters, DO   levETIRAcetam (KEPPRA) 500 MG tablet Take 1 tablet by mouth 2 times daily Yes Ricky Winters, DO   butalbital-acetaminophen-caffeine (FIORICET, ESGIC) -40 MG per tablet Take 1 tablet by mouth every 6 hours as needed for Headaches Yes CHAU Burns CNP   celecoxib (CELEBREX) 200 MG capsule Take 1 capsule by mouth daily Yes CHAU Burns CNP   cetirizine (ZYRTEC ALLERGY) 10 MG tablet Take 1 tablet by mouth daily  Patient not taking: Reported on 1/3/2022  CHAU Burns CNP       Social History     Tobacco Use    Smoking status: Never Smoker    Smokeless tobacco: Never Used   Substance Use Topics    Alcohol use: No    Drug use: No        Allergies   Allergen Reactions    Pcn [Penicillins] Swelling and Rash   ,   Past Medical History:   Diagnosis Date    Osteoarthritis     neck    Seizure ([address] 12/29/2021   ,   Past Surgical History:   Procedure Laterality Date    BREAST CYST EXCISION      in pt's 19's   ,   Social History     Tobacco Use    Smoking status: Never Smoker    Smokeless tobacco: Never Used   Substance Use Topics    Alcohol use: No    Drug use: No   ,   Family History   Problem Relation Age of Onset    Diabetes Father        PHYSICAL EXAMINATION:  [ INSTRUCTIONS:  \"[x]\" Indicates a positive item  \"[]\" Indicates a negative item  -- DELETE ALL ITEMS NOT EXAMINED]  [x] Alert  [x] Oriented to person/place/time    [x] No apparent distress  [] Toxic appearing    [] Face flushed appearing [x] Sclera clear  [] Lips are cyanotic      [x] Breathing appears normal  [] Appears tachypneic      [] Rash on visible skin    [] Cranial Nerves II-XII grossly intact    [] Motor grossly intact in visible upper extremities    [] Motor grossly intact in visible lower extremities    [x] Normal Mood  [] Anxious appearing    [] Depressed appearing  [] Confused appearing      [] Poor short term memory  [] Poor long term memory    [] OTHER:      Due to this being a TeleHealth encounter, evaluation of the following organ systems is limited: Vitals/Constitutional/EENT/Resp/CV/GI//MS/Neuro/Skin/Heme-Lymph-Imm. ASSESSMENT/PLAN:  1. Fatigue, unspecified type    - Comprehensive Metabolic Panel; Future  - CBC Auto Differential; Future  - Iron And Tibc; Future  - C-Reactive Protein; Future  - Lactic Acid, Plasma; Future  - Sedimentation Rate; Future    2. Arthralgia, unspecified joint    - Comprehensive Metabolic Panel; Future  - CBC Auto Differential; Future  - Iron And Tibc; Future  - C-Reactive Protein; Future  - Lactic Acid, Plasma; Future  - Sedimentation Rate; Future    3.  COVID-19    - Comprehensive Metabolic Panel; Future  - CBC Auto Differential; Future  - Iron And Tibc; Future  - C-Reactive Protein; Future  - Lactic Acid, Plasma; Future  - Sedimentation Rate; Future    Side effects, adverse effects of the medication prescribed today, as well as treatment plan/ rationale and result expectations have been discussed with the patient who expresses understanding and desires to proceed. Close follow up to evaluate treatment results and for coordination of care. I have reviewed the patient's medical history in detail and updated the computerized patient record. As always, patient is advised that if symptoms worsen in any way they will proceed to the nearest emergency room. Fu after bw. This visit ended at 154    An  electronic signature was used to authenticate this note. --HCAU Hudson - CNP on 1/11/2022 at 1:53 PM    This vi  Pursuant to the emergency declaration under the [facility], [facility] waiver authority and the [employer] Act, this Virtual  Visit was conducted, with patient's consent, to reduce the patient's risk of exposure to COVID-19 and provide continuity of care for an established patient. Services were provided through a video synchronous discussion virtually to substitute for in-person clinic visit.

## 2022-01-12 LAB
IRON % SATURATION: 27 % (ref 20–55)
IRON: 55 UG/DL (ref 37–145)
TOTAL IRON BINDING CAPACITY: 201 UG/DL (ref 250–450)
UNSATURATED IRON BINDING CAPACITY: 146 UG/DL (ref 112–347)

## 2022-01-13 ENCOUNTER — OFFICE VISIT (OUTPATIENT)
Dept: NEUROLOGY | Age: 67
End: 2022-01-13
Payer: MEDICARE

## 2022-01-13 VITALS
BODY MASS INDEX: 21.13 KG/M2 | WEIGHT: 127 LBS | HEART RATE: 66 BPM | DIASTOLIC BLOOD PRESSURE: 68 MMHG | SYSTOLIC BLOOD PRESSURE: 114 MMHG

## 2022-01-13 DIAGNOSIS — U09.9 POST-COVID SYNDROME: ICD-10-CM

## 2022-01-13 DIAGNOSIS — R56.9 SEIZURE (HCC): Primary | ICD-10-CM

## 2022-01-13 PROCEDURE — 99204 OFFICE O/P NEW MOD 45 MIN: CPT | Performed by: STUDENT IN AN ORGANIZED HEALTH CARE EDUCATION/TRAINING PROGRAM

## 2022-01-13 NOTE — PROGRESS NOTES
[address] [facility]  [address] [id number]     Date of Visit:  2022  Patient Name: Cinthia De La Cruz   Patient :  1955     CHIEF COMPLAINT:     Cinthia De La Cruz is a 73 y.o. female who presents today for an general visit to be evaluated for the following condition(s):  Chief Complaint   Patient presents with    Follow-up     Pt states that things have been going very slow, she was sent to get more blood work. She says that she sems to still be coming out of it all from the hospital visit. She says she is very slugish. She says that she wants to stop taking the medications. HISTORY OF PRESENT ILLNESS     HPI    Carolin Godfrey is a 70-year-old female with a past medical history of migraine headaches and arthritis who presents for hospital follow-up after an episode of seizure versus syncope on 2021. Patient is currently accompanied by  who was able to also provide some of the history.  states that patient had contracted COVID just prior to hospitalization and had poor p.o. intake. Patient was only drinking water and crackers. He reports that he heard a thump, and found her slumped over the toilet. He reports that her face was contorted and her left leg was stiff. He reports that her eyes appeared crossed. He denies that there was any twitching or convulsive like movements. He reports that the episode lasted 1.5 minutes. No tongue biting, bowel or bladder incontinence. Eyes were not deviated in one direction. Patient reports that she did know who her  was following the episode. Went to the ER and  states that she started getting that \"look in her eyes\" again. He waved his hand in front of her face which stopped the episode but he did notice that left leg straightening again.     Patient was found to be hypotensive at the hospital.  MRI of the brain was performed BREAST CYST EXCISION      in pt's 19's        Social History     Socioeconomic History    Marital status:      Spouse name: None    Number of children: None    Years of education: None    Highest education level: None   Occupational History    None   Tobacco Use    Smoking status: Never Smoker    Smokeless tobacco: Never Used   Substance and Sexual Activity    Alcohol use: No    Drug use: No    Sexual activity: None   Other Topics Concern    None   Social History Narrative    None     Social Determinants of Health     Financial Resource Strain: Low Risk     Difficulty of Paying Living Expenses: Not hard at all   Food Insecurity: No Food Insecurity    Worried About Running Out of Food in the Last Year: Never true    [address] in the Last Year: Never true   Transportation Needs:     Lack of Transportation (Medical): Not on file    Lack of Transportation (Non-Medical):  Not on file   Physical Activity:     Days of Exercise per Week: Not on file    Minutes of Exercise per Session: Not on file   Stress:     Feeling of Stress : Not on file   Social Connections:     Frequency of Communication with Friends and Family: Not on file    Frequency of Social Gatherings with Friends and Family: Not on file    Attends Confucianist Services: Not on file    Active Member of [address] [place] [address] or Organizations: Not on file    Attends Club or Organization Meetings: Not on file    Marital Status: Not on file   Intimate Partner Violence:     Fear of Current or Ex-Partner: Not on file    Emotionally Abused: Not on file    Physically Abused: Not on file    Sexually Abused: Not on file   Housing Stability:     Unable to Pay for Housing in the Last Year: Not on file    Number of [place] in the Last Year: Not on file    Unstable Housing in the Last Year: Not on file        PHYSICAL EXAM     Vitals:    01/13/22 1137   BP: 114/68   Site: Left Upper Arm   Position: Sitting   Cuff Size: Medium Adult   Pulse: 66 Weight: 127 lb (57.6 kg)     Physical Exam  Vitals and nursing note reviewed. Constitutional:       General: She is awake. Appearance: Normal appearance. HENT:      Head: Normocephalic and atraumatic. Eyes:      General: Lids are normal.      Extraocular Movements: Extraocular movements intact. Conjunctiva/sclera: Conjunctivae normal.      Pupils: Pupils are equal, round, and reactive to light. Cardiovascular:      Rate and Rhythm: Normal rate and regular rhythm. Pulses: Normal pulses. Heart sounds: Normal heart sounds. Pulmonary:      Effort: Pulmonary effort is normal.      Breath sounds: Normal breath sounds. Musculoskeletal:         General: Normal range of motion. Neurological:      General: No focal deficit present. Mental Status: She is alert and oriented to person, place, and time. Cranial Nerves: No cranial nerve deficit. Sensory: No sensory deficit. Motor: No weakness. Coordination: Coordination normal.      Gait: Gait normal.      Deep Tendon Reflexes: Reflexes normal.   Psychiatric:         Mood and Affect: Mood normal.         Behavior: Behavior normal. Behavior is cooperative. Thought Content: Thought content normal.     No gait ataxia  No pain on palpation of temporal arteries bilaterally, occiput, or posterior neck. Patient has full range of motion of neck  Exam nonfocal    ASSESSMENT/PLAN   Romualdo Cabot is a pleasant 44-year-old female here for hospital follow up for possible focal partial seizure with secondary generalization vs syncope in the setting of COVID-19 infection on 12/29/21. Blood pressure on arrival 90/64. Orthostatics negative. MRI of the brain w/+w/o did not show any vascular malformation, encephalomalacia, or mesial temporal sclerosis. EEG was not done. CK WNL and no white blood cell margination. Lactic acid normal.    Patient has not had any seizure-like activity or syncope since discharge.  Will discontinue Keppra as patient does not meet criteria for epilepsy. Will obtain EEG as a precaution. Patient educated to call immediately should she have any seizure-like activity. Should that occur, we will reinitiate AED immediately and patient will need to avoid driving within 3 months of seizure activity. Patient has Post Acute Covid Syndrome with headache, arthralgias, and fatigue. Patient currently taking Celebrex and Fioricet. Declined initiation of migraine. preventative at this time. Discussed mediation overuse headache. Discussed signs and symptoms of temporal arteritis. Sed rate elevated likely related to recent Covid-19 infection. Patient to call immediately if she develops blurred vision, jaw claudication, or visual field deficit. No pain on palpation of temporal artery. To follow up as needed.        COMMUNICATION:       Electronically signed by Dona Azevedo PA-C, PA-C on 1/13/2022 at 12:09 PM

## 2022-01-14 ENCOUNTER — CARE COORDINATION (OUTPATIENT)
Dept: CARE COORDINATION | Age: 67
End: 2022-01-14

## 2022-01-14 NOTE — CARE COORDINATION
[facility] Transitions Follow Up Call    2022    Patient: Lamonte Real  Patient :    MRN: [id number]  Reason for Admission: -21 Seizure; Covid-19  Discharge Date: 21 RARS: Readmission Risk Score: 8.7 ( )    PCP F/U completed 22  Neurology F/U completed 22    You Patient resolved from the Care Transitions episode on 22  Discussed COVID-19 related testing which was available at this time. Test results were positive. Patient informed of results, if available? Yes    Patient/family has been provided the following resources and education related to COVID-19:                         Signs, symptoms and red flags related to COVID-19            CDC exposure and quarantine guidelines            Conduit exposure contact - [phone]            Contact for their local Department of Health                 Patient currently reports that the following symptoms have improved:  fatigue and no new/worsening symptoms     No further outreach scheduled with this CTN/ACM. Episode of Care resolved. Patient has this CTN/ACM contact information if future needs arise. Care Transitions Subsequent and Final Call    Subsequent and Final Calls  Care Transitions Interventions  Other Interventions:            Follow Up  Future Appointments   Date Time Provider Estefanía Shields   2022  1:30  [address]   2022 10:15 AM CHAU Hale - CNP [facility] [address] [place] [address]

## 2022-01-17 ASSESSMENT — ENCOUNTER SYMPTOMS
CHEST TIGHTNESS: 0
TROUBLE SWALLOWING: 0
PHOTOPHOBIA: 0
NAUSEA: 0
VOMITING: 0
SHORTNESS OF BREATH: 0
ALLERGIC/IMMUNOLOGIC NEGATIVE: 1
DIARRHEA: 0

## 2022-01-19 ENCOUNTER — HOSPITAL ENCOUNTER (OUTPATIENT)
Dept: NEUROLOGY | Age: 67
Discharge: HOME | End: 2022-01-19
Payer: MEDICARE

## 2022-01-19 DIAGNOSIS — R56.9 SEIZURE (HCC): ICD-10-CM

## 2022-01-19 PROCEDURE — 95816 EEG AWAKE AND DROWSY: CPT

## 2022-01-25 NOTE — PROCEDURES
[address]                      [facility], [address] [facility]                          ELECTROENCEPHALOGRAM REPORT    PATIENT NAME: Phyllis Dacosta                  :        1955  MED REC NO:   [id number]                            ROOM:  ACCOUNT NO:   [de-identified]                           ADMIT DATE: 2022  PROVIDER:     Jasen Tesfaye MD    DATE OF EE2022    EEG FINDINGS:  This is a spontaneous 21-channel recording for this  patient with questionable seizures. Background rhythm of this EEG shows  8-9 Hz posterior dominant rhythm of alpha. This is symmetrical and  attenuates with eye opening. EKG is monitored, this is regular. Photic  stimulation is performed without any photic responses. There is no  photo response to seizures. Hyperventilation is performed with good  effort. Record remains symmetrical throughout without any asymmetries. IMPRESSION:  This is a normal well-regulated EEG recording. Clinical  correlation is recommended.         Lucia Corrales MD    D: 2022 9:52:15       T: 2022 9:55:23     [id number]  Job#: [id number]     Doc#: [id number]    CC:

## 2022-01-26 PROCEDURE — 95816 EEG AWAKE AND DROWSY: CPT | Performed by: PSYCHIATRY & NEUROLOGY

## 2022-04-18 SDOH — HEALTH STABILITY: PHYSICAL HEALTH: ON AVERAGE, HOW MANY DAYS PER WEEK DO YOU ENGAGE IN MODERATE TO STRENUOUS EXERCISE (LIKE A BRISK WALK)?: 2 DAYS

## 2022-04-18 SDOH — HEALTH STABILITY: PHYSICAL HEALTH: ON AVERAGE, HOW MANY MINUTES DO YOU ENGAGE IN EXERCISE AT THIS LEVEL?: 20 MIN

## 2022-04-18 ASSESSMENT — PATIENT HEALTH QUESTIONNAIRE - PHQ9
SUM OF ALL RESPONSES TO PHQ QUESTIONS 1-9: 0
2. FEELING DOWN, DEPRESSED OR HOPELESS: 0
SUM OF ALL RESPONSES TO PHQ QUESTIONS 1-9: 0
SUM OF ALL RESPONSES TO PHQ QUESTIONS 1-9: 0
SUM OF ALL RESPONSES TO PHQ9 QUESTIONS 1 & 2: 0
1. LITTLE INTEREST OR PLEASURE IN DOING THINGS: 0
SUM OF ALL RESPONSES TO PHQ QUESTIONS 1-9: 0

## 2022-04-18 ASSESSMENT — LIFESTYLE VARIABLES
HOW MANY STANDARD DRINKS CONTAINING ALCOHOL DO YOU HAVE ON A TYPICAL DAY: 1
HOW OFTEN DO YOU HAVE A DRINK CONTAINING ALCOHOL: 2
HOW OFTEN DO YOU HAVE A DRINK CONTAINING ALCOHOL: MONTHLY OR LESS
HOW MANY STANDARD DRINKS CONTAINING ALCOHOL DO YOU HAVE ON A TYPICAL DAY: 1 OR 2
HOW OFTEN DO YOU HAVE SIX OR MORE DRINKS ON ONE OCCASION: 1

## 2022-04-25 ENCOUNTER — OFFICE VISIT (OUTPATIENT)
Dept: FAMILY MEDICINE CLINIC | Age: 67
End: 2022-04-25
Payer: MEDICARE

## 2022-04-25 VITALS
HEART RATE: 75 BPM | SYSTOLIC BLOOD PRESSURE: 110 MMHG | OXYGEN SATURATION: 99 % | BODY MASS INDEX: 21.83 KG/M2 | HEIGHT: 65 IN | DIASTOLIC BLOOD PRESSURE: 80 MMHG | WEIGHT: 131 LBS

## 2022-04-25 DIAGNOSIS — Z78.0 POST-MENOPAUSAL: ICD-10-CM

## 2022-04-25 DIAGNOSIS — Z12.31 OTHER SCREENING MAMMOGRAM: ICD-10-CM

## 2022-04-25 DIAGNOSIS — Z00.00 WELCOME TO MEDICARE PREVENTIVE VISIT: Primary | ICD-10-CM

## 2022-04-25 PROCEDURE — G0402 INITIAL PREVENTIVE EXAM: HCPCS | Performed by: NURSE PRACTITIONER

## 2022-04-25 NOTE — PROGRESS NOTES
Medicare Annual Wellness Visit    Lanny Payer is here for Medicare AWV    Assessment & Plan   Welcome to Medicare preventive visit  Post-menopausal  -     DEXA BONE DENSITY AXIAL SKELETON; Future  Other screening mammogram  -     SHAQUILLE DIGITAL SCREEN W OR WO CAD BILATERAL; Future      Recommendations for Preventive Services Due: see orders and patient instructions/AVS.  Recommended screening schedule for the next 5-10 years is provided to the patient in written form: see Patient Instructions/AVS.     Return in 6 months (on 10/25/2022) for Medicare Annual Wellness Visit in 1 year. Subjective   The following acute and/or chronic problems were also addressed today:  None- asking about recommendations for covid vaccinations. About to go on a cruise. Patient's complete Health Risk Assessment and screening values have been reviewed and are found in Flowsheets. The following problems were reviewed today and where indicated follow up appointments were made and/or referrals ordered.     Positive Risk Factor Screenings with Interventions:               General Health and ACP:  General  In general, how would you say your health is?: Very Good  In the past 7 days, have you experienced any of the following: New or Increased Pain, New or Increased Fatigue, Loneliness, Social Isolation, Stress or Anger?: No  Do you get the social and emotional support that you need?: Yes  Do you have a Living Will?: Yes    Advance Directives     Power of  Living Will ACP-Advance Directive ACP-Power of     Not on File Not on File Not on File Not on File      General Health Risk Interventions:  · No Living Will: Advance Care Planning addressed with patient today      Safety:  Do you have working smoke detectors?: Yes  Do you have any tripping hazards - loose or unsecured carpets or rugs?: (!) Yes  Do you have any tripping hazards - clutter in doorways, halls, or stairs?: (!) Yes  Do you have either shower bars, grab bars, non-slip mats or non-slip surfaces in your shower or bathtub?: Yes  Do all of your stairways have a railing or banister?: Yes  Do you always fasten your seatbelt when you are in a car?: Yes    Safety Interventions:  · Home safety tips provided           Objective   Vitals:    04/25/22 1033   BP: 110/80   Pulse: 75   SpO2: 99%   Weight: 131 lb (59.4 kg)   Height: 5' 5\" (1.651 m)      Body mass index is 21.8 kg/m². General Appearance: alert and oriented to person, place and time, well developed and well- nourished, in no acute distress  Skin: warm and dry, no rash or erythema  Head: normocephalic and atraumatic  Eyes: pupils equal, round, and reactive to light, extraocular eye movements intact, conjunctivae normal  ENT: tympanic membrane, external ear and ear canal normal bilaterally, nose without deformity, nasal mucosa and turbinates normal without polyps  Neck: supple and non-tender without mass, no thyromegaly or thyroid nodules, no cervical lymphadenopathy  Pulmonary/Chest: clear to auscultation bilaterally- no wheezes, rales or rhonchi, normal air movement, no respiratory distress  Cardiovascular: normal rate, regular rhythm, normal S1 and S2, no murmurs, rubs, clicks, or gallops, distal pulses intact, no carotid bruits  Abdomen: soft, non-tender, non-distended, normal bowel sounds, no masses or organomegaly  Extremities: no cyanosis, clubbing or edema  Musculoskeletal: normal range of motion, no joint swelling, deformity or tenderness  Neurologic: reflexes normal and symmetric, no cranial nerve deficit, gait, coordination and speech normal       Allergies   Allergen Reactions    Pcn [Penicillins] Swelling and Rash     Prior to Visit Medications    Medication Sig Taking?  Authorizing Provider   apixaban (ELIQUIS) 2.5 MG TABS tablet Take 1 tablet by mouth 2 times daily Yes Mica Jansen DO   levETIRAcetam (KEPPRA) 500 MG tablet Take 1 tablet by mouth 2 times daily Yes Mica Jansen, DO butalbital-acetaminophen-caffeine (FIORICET, ESGIC) -40 MG per tablet Take 1 tablet by mouth every 6 hours as needed for Headaches Yes CHAU Drake CNP   celecoxib (CELEBREX) 200 MG capsule Take 1 capsule by mouth daily Yes CHAU Drake CNP   cetirizine (ZYRTEC ALLERGY) 10 MG tablet Take 1 tablet by mouth daily Yes CHAU Drake CNP       CareTeam (Including outside providers/suppliers regularly involved in providing care):   Patient Care Team:  CHAU Drake CNP as PCP - General (Nurse Practitioner)  CHAU Drake CNP as PCP - [facility] Empaneled Provider  Sarah Rain PA-C (Physician Assistant)    Reviewed and updated this visit:  Tobacco  Allergies  Meds  Med Hx  Surg Hx  Soc Hx  Fam Hx

## 2022-04-25 NOTE — PATIENT INSTRUCTIONS
Personalized Preventive Plan for [place] Torres - 4/25/2022  Medicare offers a range of preventive health benefits. Some of the tests and screenings are paid in full while other may be subject to a deductible, co-insurance, and/or copay. Some of these benefits include a comprehensive review of your medical history including lifestyle, illnesses that may run in your family, and various assessments and screenings as appropriate. After reviewing your medical record and screening and assessments performed today your provider may have ordered immunizations, labs, imaging, and/or referrals for you. A list of these orders (if applicable) as well as your Preventive Care list are included within your After Visit Summary for your review. Other Preventive Recommendations:    · A preventive eye exam performed by an eye specialist is recommended every 1-2 years to screen for glaucoma; cataracts, macular degeneration, and other eye disorders. · A preventive dental visit is recommended every 6 months. · Try to get at least 150 minutes of exercise per week or 10,000 steps per day on a pedometer . · Order or download the FREE \"Exercise & Physical Activity: Your Everyday Guide\" from [employer]. Call 7-573.111.7844 or search [employer] online. · You need 2077-4675 mg of calcium and 5463-2079 IU of vitamin D per day. It is possible to meet your calcium requirement with diet alone, but a vitamin D supplement is usually necessary to meet this goal.  · When exposed to the sun, use a sunscreen that protects against both UVA and UVB radiation with an SPF of 30 or greater. Reapply every 2 to 3 hours or after sweating, drying off with a towel, or swimming. · Always wear a seat belt when traveling in a car. Always wear a helmet when riding a bicycle or motorcycle.

## 2022-05-10 ENCOUNTER — HOSPITAL ENCOUNTER (OUTPATIENT)
Dept: WOMENS IMAGING | Age: 67
Discharge: HOME | End: 2022-05-12
Payer: MEDICARE

## 2022-05-10 DIAGNOSIS — Z78.0 POST-MENOPAUSAL: ICD-10-CM

## 2022-05-10 DIAGNOSIS — Z12.31 OTHER SCREENING MAMMOGRAM: ICD-10-CM

## 2022-05-10 PROCEDURE — 77063 BREAST TOMOSYNTHESIS BI: CPT

## 2022-05-10 PROCEDURE — 77080 DXA BONE DENSITY AXIAL: CPT

## 2022-05-16 NOTE — RESULT ENCOUNTER NOTE
Pt aware of results. Had many questions about tx for osteoporosis so I scheduled her with NL for 5/18/22.

## 2022-05-18 ENCOUNTER — COMMUNITY OUTREACH (OUTPATIENT)
Dept: INTERNAL MEDICINE | Age: 67
End: 2022-05-18

## 2022-05-18 ENCOUNTER — OFFICE VISIT (OUTPATIENT)
Dept: FAMILY MEDICINE CLINIC | Age: 67
End: 2022-05-18
Payer: MEDICARE

## 2022-05-18 VITALS
TEMPERATURE: 96.3 F | HEART RATE: 79 BPM | WEIGHT: 132 LBS | HEIGHT: 65 IN | DIASTOLIC BLOOD PRESSURE: 74 MMHG | BODY MASS INDEX: 21.99 KG/M2 | OXYGEN SATURATION: 99 % | SYSTOLIC BLOOD PRESSURE: 106 MMHG

## 2022-05-18 DIAGNOSIS — M81.0 OSTEOPOROSIS WITHOUT CURRENT PATHOLOGICAL FRACTURE, UNSPECIFIED OSTEOPOROSIS TYPE: Primary | ICD-10-CM

## 2022-05-18 PROCEDURE — 99213 OFFICE O/P EST LOW 20 MIN: CPT | Performed by: NURSE PRACTITIONER

## 2022-05-18 RX ORDER — DENOSUMAB 60 MG/ML
60 INJECTION SUBCUTANEOUS ONCE
Qty: 180 ML | Refills: 0 | Status: CANCELLED | OUTPATIENT
Start: 2022-05-18 | End: 2022-05-18

## 2022-05-18 ASSESSMENT — ENCOUNTER SYMPTOMS
DIARRHEA: 0
COUGH: 0
SHORTNESS OF BREATH: 0
CONSTIPATION: 0

## 2022-05-18 NOTE — PROGRESS NOTES
Subjective  Chief Complaint   Patient presents with    Discuss Labs     discuss dexa scan, possible prolia        HPI    Pt is here to discuss recent Dexa result:     FINDINGS:        The mean bone mineral density from L1 through L4 is 0.909 g/cm2,and the T-score is -2.3, which is  2.3 standard deviations below the standard reference value for young adult.       Bone mineral density of the left femoral neck is 0.705g/cm2,  and the T-score is    -2.4, which is  2.4 standard deviations below the standard reference value for young adult.       Bone mineral density of the right femoral neck is 0.682g/cm2, and the T-score is    -2.6, which is  2.6 standard deviations below the standard reference value for young adult.       These values meet WHO criteria for osteoporosis.     10 year probability (FRAX) of major osteoporotic fracture based on the right femoral neck bone mineral density is: 12.7%, and hip fracture 3.0%.                 Impression       OSTEOPOROSIS. She would like to discuss tx options. Her main concern is that she has been having significant jaw and teeth issues already and she knows tx at times can worsen this.      Patient Active Problem List    Diagnosis Date Noted    Seizure [facility] 12/29/2021     Past Medical History:   Diagnosis Date    Osteoarthritis     neck    Seizure ([facility] [address] 12/29/2021     Past Surgical History:   Procedure Laterality Date    US BREAST FINE NEEDLE ASPIRATION       Family History   Problem Relation Age of Onset    Diabetes Father     Breast Cancer Neg Hx      Social History     Socioeconomic History    Marital status:      Spouse name: None    Number of children: None    Years of education: None    Highest education level: None   Occupational History    None   Tobacco Use    Smoking status: Never Smoker    Smokeless tobacco: Never Used   Substance and Sexual Activity    Alcohol use: No    Drug use: No    Sexual activity: None   Other Topics Concern    None   Social History Narrative    None     Social Determinants of Health     Financial Resource Strain: Low Risk     Difficulty of Paying Living Expenses: Not hard at all   Food Insecurity: No Food Insecurity    Worried About Running Out of Food in the Last Year: Never true    Jonas of Food in the Last Year: Never true   Transportation Needs:     Lack of Transportation (Medical): Not on file    Lack of Transportation (Non-Medical): Not on file   Physical Activity: Insufficiently Active    Days of Exercise per Week: 2 days    Minutes of Exercise per Session: 20 min   Stress:     Feeling of Stress : Not on file   Social Connections:     Frequency of Communication with Friends and Family: Not on file    Frequency of Social Gatherings with Friends and Family: Not on file    Attends Baptism Services: Not on file    Active Member of Clubs or Organizations: Not on file    Attends Club or Organization Meetings: Not on file    Marital Status: Not on file   Intimate Partner Violence:     Fear of Current or Ex-Partner: Not on file    Emotionally Abused: Not on file    Physically Abused: Not on file    Sexually Abused: Not on file   Housing Stability:     Unable to Pay for Housing in the Last Year: Not on file    Number of [place] in the Last Year: Not on file    Unstable Housing in the Last Year: Not on file     Current Outpatient Medications on File Prior to Visit   Medication Sig Dispense Refill    celecoxib (CELEBREX) 200 MG capsule Take 1 capsule by mouth daily 30 capsule 5    cetirizine (ZYRTEC ALLERGY) 10 MG tablet Take 1 tablet by mouth daily 30 tablet 3    butalbital-acetaminophen-caffeine (FIORICET, ESGIC) -40 MG per tablet Take 1 tablet by mouth every 6 hours as needed for Headaches 30 tablet 2     No current facility-administered medications on file prior to visit.      Allergies   Allergen Reactions    Pcn [Penicillins] Swelling and Rash       Review of Systems Constitutional: Negative for fatigue. Respiratory: Negative for cough and shortness of breath. Cardiovascular: Negative for chest pain. Gastrointestinal: Negative for constipation and diarrhea. Objective  Vitals:    05/18/22 0904   BP: 106/74   Pulse: 79   Temp: 96.3 °F (35.7 °C)   SpO2: 99%   Weight: 132 lb (59.9 kg)   Height: 5' 5\" (1.651 m)     Physical Exam  Vitals and nursing note reviewed. Constitutional:       Appearance: Normal appearance. She is normal weight. HENT:      Head: Normocephalic. Cardiovascular:      Rate and Rhythm: Normal rate. Pulmonary:      Effort: Pulmonary effort is normal.   Musculoskeletal:      Cervical back: Neck supple. Skin:     General: Skin is warm. Neurological:      General: No focal deficit present. Mental Status: She is alert and oriented to person, place, and time. Mental status is at baseline. Psychiatric:         Mood and Affect: Mood normal.         Behavior: Behavior normal.         Thought Content: Thought content normal.         Judgment: Judgment normal.       Assessment & Plan     Diagnosis Orders   1. Osteoporosis without current pathological fracture, unspecified osteoporosis type       Pt declining tx for now. Will start vitamin D and calcium supplement. Discussed importance of weight bearing exercise. Will recheck dexa in two years    Side effects, adverse effects of the medication prescribed today, as well as treatment plan/ rationale and result expectations have been discussed with the patient who expresses understanding and desires to proceed. Close follow up to evaluate treatment results and for coordination of care. I have reviewed the patient's medical history in detail and updated the computerized patient record. As always, patient is advised that if symptoms worsen in any way they will proceed to the nearest emergency room.      CHAU Berkowitz - CNP

## 2022-05-18 NOTE — PROGRESS NOTES
Patient's HM shows they are overdue for Colorectal Screening and Cervical Cancer Screening. [employer] and  files searched without success. No results to attach to order nor HM updated. Note added to upcoming appointment to discuss Care Gap.

## 2022-10-26 ENCOUNTER — OFFICE VISIT (OUTPATIENT)
Dept: FAMILY MEDICINE CLINIC | Age: 67
End: 2022-10-26
Payer: MEDICARE

## 2022-10-26 VITALS
HEART RATE: 75 BPM | SYSTOLIC BLOOD PRESSURE: 110 MMHG | DIASTOLIC BLOOD PRESSURE: 82 MMHG | OXYGEN SATURATION: 99 % | WEIGHT: 132 LBS | HEIGHT: 65 IN | BODY MASS INDEX: 21.99 KG/M2

## 2022-10-26 DIAGNOSIS — M43.6 TORTICOLLIS: Primary | ICD-10-CM

## 2022-10-26 DIAGNOSIS — R51.9 CHRONIC NONINTRACTABLE HEADACHE, UNSPECIFIED HEADACHE TYPE: ICD-10-CM

## 2022-10-26 DIAGNOSIS — J30.9 ALLERGIC RHINITIS, UNSPECIFIED SEASONALITY, UNSPECIFIED TRIGGER: ICD-10-CM

## 2022-10-26 DIAGNOSIS — Z12.11 COLON CANCER SCREENING: ICD-10-CM

## 2022-10-26 DIAGNOSIS — G89.29 CHRONIC NONINTRACTABLE HEADACHE, UNSPECIFIED HEADACHE TYPE: ICD-10-CM

## 2022-10-26 DIAGNOSIS — R19.5 POSITIVE FIT (FECAL IMMUNOCHEMICAL TEST): ICD-10-CM

## 2022-10-26 PROCEDURE — 1123F ACP DISCUSS/DSCN MKR DOCD: CPT | Performed by: NURSE PRACTITIONER

## 2022-10-26 PROCEDURE — 99214 OFFICE O/P EST MOD 30 MIN: CPT | Performed by: NURSE PRACTITIONER

## 2022-10-26 RX ORDER — LEVOCETIRIZINE DIHYDROCHLORIDE 5 MG/1
5 TABLET, FILM COATED ORAL DAILY
Qty: 30 TABLET | Refills: 5 | Status: SHIPPED | OUTPATIENT
Start: 2022-10-26

## 2022-10-26 RX ORDER — CELECOXIB 200 MG/1
200 CAPSULE ORAL DAILY
Qty: 30 CAPSULE | Refills: 5 | Status: SHIPPED | OUTPATIENT
Start: 2022-10-26

## 2022-10-26 RX ORDER — BUTALBITAL, ACETAMINOPHEN AND CAFFEINE 50; 325; 40 MG/1; MG/1; MG/1
1 TABLET ORAL EVERY 6 HOURS PRN
Qty: 30 TABLET | Refills: 2 | Status: SHIPPED | OUTPATIENT
Start: 2022-10-26 | End: 2023-01-24

## 2022-10-26 RX ORDER — LEVOCETIRIZINE DIHYDROCHLORIDE 5 MG/1
2.5 TABLET, FILM COATED ORAL DAILY
COMMUNITY
End: 2022-10-26 | Stop reason: SDUPTHER

## 2022-10-26 ASSESSMENT — ENCOUNTER SYMPTOMS
SHORTNESS OF BREATH: 0
ABDOMINAL DISTENTION: 0
BACK PAIN: 0
CHEST TIGHTNESS: 0
COUGH: 0

## 2022-10-26 NOTE — PROGRESS NOTES
Subjective  Chief Complaint   Patient presents with    6 Month Follow-Up       HPI  Overall doing well    Struggling with neck pain. Thinks she hurt neck when lifting heavy suitcases back in August  Has been taking celebrex and motrin. Helps some. Notices change with ROM. Reports Kayaking this weekend increased ROM     HA's- well controlled. Taking Fioricet as needed    No other concerns or complaints     Needs refill of allergy meds- well controlled. Had positive FIT test in 2019. Declined a colonoscopy since then. May consider now. Patient Active Problem List    Diagnosis Date Noted    Seizure ([address] 12/29/2021     Past Medical History:   Diagnosis Date    Osteoarthritis     neck    Seizure ([address] 12/29/2021     Past Surgical History:   Procedure Laterality Date    US BREAST FINE NEEDLE ASPIRATION       Family History   Problem Relation Age of Onset    Diabetes Father     Breast Cancer Neg Hx      Social History     Socioeconomic History    Marital status:      Spouse name: None    Number of children: None    Years of education: None    Highest education level: None   Tobacco Use    Smoking status: Never    Smokeless tobacco: Never   Substance and Sexual Activity    Alcohol use: No    Drug use: No     Social Determinants of Health     Financial Resource Strain: Low Risk     Difficulty of Paying Living Expenses: Not hard at all   Food Insecurity: No Food Insecurity    Worried About Running Out of Food in the Last Year: Never true    Ran Out of Food in the Last Year: Never true   Physical Activity: Insufficiently Active    Days of Exercise per Week: 2 days    Minutes of Exercise per Session: 20 min     No current outpatient medications on file prior to visit. No current facility-administered medications on file prior to visit. Allergies   Allergen Reactions    Pcn [Penicillins] Swelling and Rash       Review of Systems   Constitutional:  Negative for activity change, fatigue and fever. Respiratory:  Negative for cough, chest tightness and shortness of breath. Cardiovascular:  Negative for chest pain and palpitations. Gastrointestinal:  Negative for abdominal distention. Musculoskeletal:  Positive for neck pain and neck stiffness. Negative for back pain. Neurological:  Negative for dizziness, syncope, light-headedness and headaches. Objective  Vitals:    10/26/22 1013   BP: 110/82   Pulse: 75   SpO2: 99%   Weight: 132 lb (59.9 kg)   Height: 5' 5\" (1.651 m)     Physical Exam  Vitals and nursing note reviewed. Constitutional:       Appearance: Normal appearance. HENT:      Head: Normocephalic. Nose: Nose normal.      Mouth/Throat:      Mouth: Mucous membranes are moist.   Eyes:      Conjunctiva/sclera: Conjunctivae normal.      Pupils: Pupils are equal, round, and reactive to light. Neck:      Trachea: Trachea normal.   Cardiovascular:      Rate and Rhythm: Normal rate and regular rhythm. Pulses: Normal pulses. Heart sounds: Normal heart sounds. Pulmonary:      Effort: Pulmonary effort is normal.      Breath sounds: Normal breath sounds. Abdominal:      General: Abdomen is flat. Palpations: Abdomen is soft. Musculoskeletal:         General: Normal range of motion. Cervical back: Neck supple. Signs of trauma, torticollis and tenderness present. No erythema or rigidity. Spinous process tenderness and muscular tenderness present. No pain with movement. Lymphadenopathy:      Cervical: No cervical adenopathy. Skin:     General: Skin is warm and dry. Neurological:      General: No focal deficit present. Mental Status: She is alert and oriented to person, place, and time. Mental status is at baseline. Psychiatric:         Mood and Affect: Mood normal.         Behavior: Behavior normal.         Thought Content: Thought content normal.         Judgment: Judgment normal.       Assessment & Plan     Diagnosis Orders   1.  Torticollis  Ambulatory referral to Physical Therapy      2. Chronic nonintractable headache, unspecified headache type  butalbital-acetaminophen-caffeine (FIORICET, ESGIC) -40 MG per tablet    celecoxib (CELEBREX) 200 MG capsule      3. Allergic rhinitis, unspecified seasonality, unspecified trigger  levocetirizine (XYZAL) 5 MG tablet      4. Colon cancer screening  Ambulatory referral to Gastroenterology      5.  Positive FIT (fecal immunochemical test)  Ambulatory referral to Gastroenterology          Orders Placed This Encounter   Procedures    Ambulatory referral to Physical Therapy     Referral Priority:   Routine     Referral Type:   Eval and Treat     Referral Reason:   Specialty Services Required     Requested Specialty:   Physical Therapist     Number of Visits Requested:   1    Ambulatory referral to Gastroenterology     Referral Priority:   Routine     Referral Type:   Eval and Treat     Referral Reason:   Specialty Services Required     Requested Specialty:   Gastroenterology     Number of Visits Requested:   1       Orders Placed This Encounter   Medications    butalbital-acetaminophen-caffeine (FIORICET, ESGIC) -40 MG per tablet     Sig: Take 1 tablet by mouth every 6 hours as needed for Headaches     Dispense:  30 tablet     Refill:  2    celecoxib (CELEBREX) 200 MG capsule     Sig: Take 1 capsule by mouth daily     Dispense:  30 capsule     Refill:  5    levocetirizine (XYZAL) 5 MG tablet     Sig: Take 1 tablet by mouth daily     Dispense:  30 tablet     Refill:  5       Medications Discontinued During This Encounter   Medication Reason    celecoxib (CELEBREX) 200 MG capsule REORDER    butalbital-acetaminophen-caffeine (FIORICET, ESGIC) -40 MG per tablet REORDER    levocetirizine (XYZAL) 5 MG tablet REORDER    cetirizine (ZYRTEC ALLERGY) 10 MG tablet ERROR      Side effects, adverse effects of the medication prescribed today, as well as treatment plan/ rationale and result expectations have been discussed with the patient who expresses understanding and desires to proceed. Close follow up to evaluate treatment results and for coordination of care. I have reviewed the patient's medical history in detail and updated the computerized patient record. As always, patient is advised that if symptoms worsen in any way they will proceed to the nearest emergency room.        Kendra Cordoba, CHAU - CNP

## 2022-11-17 ENCOUNTER — HOSPITAL ENCOUNTER (OUTPATIENT)
Dept: PHYSICAL THERAPY | Age: 67
Setting detail: THERAPIES SERIES
Discharge: HOME | End: 2022-11-17

## 2022-12-05 ENCOUNTER — HOSPITAL ENCOUNTER (OUTPATIENT)
Dept: PHYSICAL THERAPY | Age: 67
Setting detail: THERAPIES SERIES
Discharge: HOME | End: 2022-12-05
Payer: MEDICARE

## 2022-12-05 PROCEDURE — G0283 ELEC STIM OTHER THAN WOUND: HCPCS

## 2022-12-05 PROCEDURE — 97110 THERAPEUTIC EXERCISES: CPT

## 2022-12-05 PROCEDURE — 97162 PT EVAL MOD COMPLEX 30 MIN: CPT

## 2022-12-05 ASSESSMENT — PAIN DESCRIPTION - ORIENTATION: ORIENTATION: LEFT

## 2022-12-05 ASSESSMENT — PAIN DESCRIPTION - DESCRIPTORS: DESCRIPTORS: ACHING

## 2022-12-05 ASSESSMENT — PAIN SCALES - GENERAL: PAINLEVEL_OUTOF10: 5

## 2022-12-05 ASSESSMENT — PAIN DESCRIPTION - ONSET: ONSET: PROGRESSIVE

## 2022-12-05 ASSESSMENT — PAIN DESCRIPTION - LOCATION: LOCATION: NECK

## 2022-12-05 ASSESSMENT — PAIN - FUNCTIONAL ASSESSMENT: PAIN_FUNCTIONAL_ASSESSMENT: PREVENTS OR INTERFERES SOME ACTIVE ACTIVITIES AND ADLS

## 2022-12-05 NOTE — PLAN OF CARE
[facility]  PHYSICAL THERAPY PLAN OF CARE   [facility]      [address], [address] [facility], [address] [address]     Ph: [phone] Fax: [phone]      [x] Certification  [] Recertification [x]  Plan of Care  [] Progress Note [] Discharge      Referring Provider: CHAU Gutierrez -*      From:  Munir Leon PT   Patient: Dora Germain (28 y.o. female) : 1955 Date: 2022   Medical Diagnosis: Torticollis [M43.6]    Treatment Diagnosis: neck pain, postural abnormality, decreased cervical ROM    Plan of Care/Certification Expiration Date: : 23   Progress Report Period from:  2022  to 2022    Visits to Date: 1 No Show: 0 Cancelled Appts: 0    OBJECTIVE:   Short Term Goals - Time Frame for Short Term Goals: 3 wks    Goals Current/Discharge status  Status   Short Term Goal 1: Independent with HEP to promote home management of symptoms  Need for HEP and education  New   Short Term Goal 2: Report 25% reduction in symptoms with improved ability to rotate head to left during driving  Reports 0-74/72 discomfort and stiffness, affecting primarily Lt rotation, driving, IADLs New     Long Term Goals - Time Frame for Long Term Goals : 5 wks  Goals Current/ Discharge status Status   Long Term Goal 1: Improve UE and posture strength >/= 4+/5 to improve ease with lifting and carrying objects       Strength RUE  R Shoulder Flexion: 4+/5  R Shoulder Extension: 4+/5  R Shoulder ABduction: 4+/5  R Shoulder Internal Rotation: 4+/5  R Shoulder External Rotation: 4/5  R Elbow Flexion: 4+/5  R Elbow Extension: 4+/5  Strength LUE  L Shoulder Flexion: 4+/5  L Shoulder Extension: 4+/5  L Shoulder ABduction: 4+/5  L Shoulder Internal Rotation: 4+/5  L Shoulder External Rotation: 4/5  L Elbow Flexion: 4+/5  L Elbow Extension: 4+/5  L Forearm Pron: 4+/5  L Forearm Sup: 4+/5    New   Long Term Goal 2: Improve cervical AROM WFL to improve ease with driving and all IADLs  New Long Term Goal 3: NDI </= 5 to demonstrate improved overall activity tolerance  New     Body Structures, Functions, Activity Limitations Requiring Skilled Therapeutic Intervention: Decreased ROM, Decreased strength, Decreased posture, Increased pain  Assessment: Pt presents with long h/o neck pain with exacerbation in August after lifting heavy suitcases. Pt with exaggerated spinal curvatures with probable contribution to symptoms. Noted increased tightness throughout cervical musculature Lt > Rt. Noted prominence and tenderness C3 spinous process. Initiated METS to improve cervical alignment and ROM. Initiated ther ex for strengthening and postural stability. Initiated estim and HP to decrease pain, inflammation and improve ROM. Pt would benefit from skilled PT to address deficits and return to previous function with minimal c/o pain. Therapy Prognosis: Good    PT Education: Goals;PT Role;Plan of Care;Evaluative findings;Home Exercise Program;Posture    PLAN: [x] Evaluate and Treat  Frequency/Duration:  Plan Frequency: 2  Plan weeks: 5  Current Treatment Recommendations: Strengthening, ROM, Manual, Home exercise program, Patient/Caregiver education & training, Modalities, Positioning     Precautions:Restrictions/Precautions: Seizure                        Patient Status:[x] Continue/ Initiate plan of Care    [] Discharge PT. Recommend pt continue with HEP. [] Additional visits requested, Please re-certify for additional visits:    [] Hold         Signature: Electronically signed by Eva Sultana PT on 12/5/22 at 10:59 AM EST    If you have any questions or concerns, please don't hesitate to call.   Thank you for your referral.

## 2022-12-05 NOTE — PROGRESS NOTES
[facility]) Physical Therapy-  [facility]   PHYSICAL THERAPY EVALUATION      Physical Therapy: Initial Evaluation    Patient: Eleno Hodgkins (46 y.o.     female)   Examination Date:   Plan of Care Certification Period: 2022 to        :  1955 ;    Confirmed: Yes MRN: [id number]  CSN: [id number]   Insurance: Payor: Athena Phalen / Plan: Anya Ramos PPO / Product Type: Medicare /   Insurance ID: [id number] - (Medicare Managed) Secondary Insurance (if applicable):    Referring Physician: CHAU Pretty -*     PCP: CHAU Argueta CNP Visits to Date/Visits Approved:   /  (medical necessity)    No Show/Cancelled Appts:   /       Medical Diagnosis: Torticollis [M43.6]    Treatment Diagnosis: neck pain, postural abnormality, decreased cervical ROM     PERTINENT MEDICAL HISTORY      Self reported health status[de-identified] Good    Medical History: Chart Reviewed: Yes   Past Medical History:   Diagnosis Date    Osteoarthritis     neck    Seizure ([facility] [address] 2021     Surgical History:   Past Surgical History:   Procedure Laterality Date    US BREAST FINE NEEDLE ASPIRATION         Medications:   Current Outpatient Medications:     butalbital-acetaminophen-caffeine (FIORICET, ESGIC) -40 MG per tablet, Take 1 tablet by mouth every 6 hours as needed for Headaches, Disp: 30 tablet, Rfl: 2    celecoxib (CELEBREX) 200 MG capsule, Take 1 capsule by mouth daily, Disp: 30 capsule, Rfl: 5    levocetirizine (XYZAL) 5 MG tablet, Take 1 tablet by mouth daily, Disp: 30 tablet, Rfl: 5  Allergies: Pcn [penicillins]      SUBJECTIVE EXAMINATION     History obtained from[de-identified] Patient, Chart Review,      Family/Caregiver Present: No    Subjective History:    Subjective: pt reports onset of neck pain approximately 10 years ago Lt > Rt. Pt states exacerbation of symptoms 22 after lifting and weighing heavy suitcases. Pt states decreased ROM and discomfort.  Pt reports some relief after kayaking and feels like moving helped. Pt states exacerbated by lifting or moving heavy objects. Pt states difficulty with Lt rotation. Pt denies numbness/ tingling or radicular symptoms. Pt reports no weakness. Additional Pertinent Hx (if applicable): OA, seizure          Learning/Language:   English    Pain Screening    Pain Screening  Patient Currently in Pain: Yes  Pain Assessment: 0-10  Pain Level: 5  Best Pain Level: 3  Worst Pain Level: 10  Date pain first started: 08/23/22  Pain Location: Neck  Pain Orientation: Left  Pain Descriptors: Aching (stiffness)  Pain Onset: Progressive  Functional Pain Assessment: Prevents or interferes some active activities and ADLs  Aggravating factors: Reaching, Lifting, Carrying  Pain Management/Relieving Factors: Rest    Functional Status    Social History:    Social History  Lives With: Spouse  Type of Home: House  Home Layout: Multi-level  Home Access: Stairs to enter without rails  Entrance Stairs - Number of Steps: 2    Occupation/Interests:   Occupation: Retired  Leisure & Hobbies: kayaking, side by side, camping, hiking, biking    Prior Level of Function:     Independent        Current Level of Function:   Pt is independent with ADLs and IADLs, but with increased pain.       ADL Assistance: Independent  Homemaking Assistance: Independent  Ambulation Assistance: Independent  Transfer Assistance: Independent  Active : Yes (some difficulty with rotation)    Dominant Hand: : Left    OBJECTIVE EXAMINATION     Restrictions:   Restrictions/Precautions: Seizure          Review of Systems:  Vision: Impaired  Visual Deficits: Wears glasses  Hearing: Within functional limits  Overall Orientation Status: Within Functional Limits  Patient affect[de-identified] Normal  Follows Commands: Within Functional Limits    Observations:   General Observations  Cervical: Forward head posture  Thoracic Spine: Thoracic Hyperkyphosis  Shoulders: Rounded  Scapulae: Right Protracted, Left Protracted  Spine / Pelvis: Increased lumbar lordosis  Description: exaggerated spinal curvatures, but no sig asymmetries    Palpation:   Cervical Spine Palpation: noted crepitus Lt scap with most motions, prominence C3 spinous process with tenderness with palpation.  tightness throughout cervical musculature Lt > Rt with spasm throughout rhomboids and paraspinals  Right Shoulder Palpation: WFL  Left Shoulder Palpation: WFL    Left AROM  Right AROM       UE WFL     UE WFL         Left Strength  Right Strength         Strength LUE  L Shoulder Flexion: 4+/5  L Shoulder Extension: 4+/5  L Shoulder ABduction: 4+/5  L Shoulder Internal Rotation: 4+/5  L Shoulder External Rotation: 4/5  L Elbow Flexion: 4+/5  L Elbow Extension: 4+/5  L Forearm Pron: 4+/5  L Forearm Sup: 4+/5  L Upper Trapezius: 4+/5  L Middle Trapezius: 4-/5  L Rhomboids: 4-/5  L Lower Trapezius: 3+/5    Strength RUE  R Shoulder Flexion: 4+/5  R Shoulder Extension: 4+/5  R Shoulder ABduction: 4+/5  R Shoulder Internal Rotation: 4+/5  R Shoulder External Rotation: 4/5  R Elbow Flexion: 4+/5  R Elbow Extension: 4+/5  R Upper Trapezius: 4+/5  R Middle Trapezius: 4/5  R Rhomboids: 4/5  R Lower Trapezius: 4-/5     Cervical Assessment     AROM Cervical Spine   Cervical flexion: 67  Cervical extension: 55  Cervical right lateral: 36  Cervical left lateral: 24  Cervical right rotation: 65           Special Tests:   Special Tests for Cervical Spine  Compression Test : (-)  Cervical Distraction Test: (-)    Outcomes Score:  Exam: musculoskeletal, pain and sensory systems involved impacting strength, ROM, posture, IADLs; NDI: 13     Treatment:    Exercises:   Exercises  Exercise 1: posture exs x10  Exercise 2: * chin tucks  Exercise 3: * prone scap  Exercise 4: * pec str  Exercise 5: * UBE retro  Exercise 6: * cervical AROM  Exercise 7: * UT str  Exercise 8: * levator str  Exercise 9: * thoracic open book  Exercise 10: * Tband rows, lats  Exercise 20: HEP: posture exs  Treatment Reasoning  Limitations addressed: Mobility, Strength, Flexibility, Activity tolerance, Posture  Modalities:  Moist Heat (CPT 55971)  Patient Position: Seated  Moist heat location: Cervical  Post treatment skin assessment: Intact  Electric stimulation, unattended (CPT 15671) /  (Medicare)  Patient Position: Seated  E-stim location: Cervical  E-stim type: Interferential (IFC)  E-stim via: 4 Electrode Pads  Post treatment skin assessment: Intact  Limitations addressed: Pain modulation, Edema, Tissue extensibility, Joint mobility     Manual:  Manual Therapy  Manual Traction: cervical with no change in symptoms  Soft Tissue Mobilizaton: tennis ball/ STM bilateral paraspinals, rhomboids with spasm noted  Other: * consider MFD cupping (static and dynamic)/ KT (I inhibition)  Lt UT, paraspinals  Treatment Reasoning  Limitations addressed: Joint motion, Tissue extensibility, Edema, Painful spasm  *Indicates exercise,modality, or manual techniques to be initiated when appropriate       ASSESSMENT     Impression: Assessment: Pt presents with long h/o neck pain with exacerbation in August after lifting heavy suitcases. Pt with exaggerated spinal curvatures with probable contribution to symptoms. Noted increased tightness throughout cervical musculature Lt > Rt. Noted prominence and tenderness C3 spinous process. Initiated METS to improve cervical alignment and ROM. Initiated ther ex for strengthening and postural stability. Initiated estim and HP to decrease pain, inflammation and improve ROM. Pt would benefit from skilled PT to address deficits and return to previous function with minimal c/o pain.     Body Structures, Functions, Activity Limitations Requiring Skilled Therapeutic Intervention: Decreased ROM, Decreased strength, Decreased posture, Increased pain    Statement of Medical Necessity: Physical Therapy is both indicated and medically necessary as outlined in the POC to increase the likelihood of meeting the functionally related goals stated below.      Patient's Activity Tolerance: Patient tolerated evaluation without incident      Patient's rehabilitation potential/prognosis is considered to be: Good    Factors which may impact rehabilitation potential include: None     Patient Education: Goals, PT Role, Plan of Care, Evaluative findings, Home Exercise Program, Posture      GOALS   Patient Goal(s): Patient Goals : loosen up, be able to turn head better    Short Term Goals Completed by 3 wks Goal Status   Independent with HEP to promote home management of symptoms New   Report 25% reduction in symptoms with improved ability to rotate head to left during driving New     Long Term Goals Completed by 5 wks Goal Status   Improve UE and posture strength >/= 4+/5 to improve ease with lifting and carrying objects New   Improve cervical AROM WFL to improve ease with driving and all IADLs New   NDI </= 5 to demonstrate improved overall activity tolerance New        TREATMENT PLAN       Requires PT Follow-Up: Yes    Treatment may include any combination of the following: Strengthening, ROM, Manual, Home exercise program, Patient/Caregiver education & training, Modalities, Positioning     Frequency / Duration:  Patient to be seen 2 times per week for 5 weeks  Plan Comment:               Eval Complexity:   Decision Making: Medium Complexity  History: Personal Factors and/or Comorbidities Impacting POC: Medium  History: personal factors: age; contributing PMH: OA, migraines, osteoporosis  Examination of body system(s) including body structures and functions, activity limitations, and/or participation restrictions: Medium  Exam: musculoskeletal, pain and sensory systems involved impacting strength, ROM, posture, IADLs; NDI: 13  Clinical Presentation: Medium  Clinical Presentation: evolving, exacerbation of symptoms    POST-PAIN     Pain Rating (0-10 pain scale):   4/10  Location and pain description same as pre-treatment unless indicated. Action: [] NA  [] Call Physician  [x] Perform HEP  [x] Meds as prescribed    Evaluation and patient rights have been reviewed and patient agrees with plan of care. Yes  [x]  No  []   Explain:     Geeta Fall Risk Assessment  Risk Factor Scale  Score   History of Falls [] Yes  [x] No 25  0 0   Secondary Diagnosis [] Yes  [x] No 15  0 0   Ambulatory Aid [] Furniture  [] Crutches/cane/walker  [x] None/bedrest/wheelchair/nurse 30  15  0 0   IV/Heparin Lock [] Yes  [x] No 20  0 0   Gait/Transferring [] Impaired  [] Weak  [x] Normal/bedrest/immobile 20  10  0 0   Mental Status [] Forgets limitations  [x] Oriented to own ability 15  0 0      Total:0     Based on the Assessment score: check the appropriate box.   [x]  No intervention needed   Low =   Score of 0-24  []  Use standard prevention interventions Moderate =  Score of 24-44   [] Discuss fall prevention strategies   [] Indicate moderate falls risk on eval  []  Use high risk prevention interventions High = Score of 45 and higher   [] Discuss fall prevention strategies   [] Provide supervision during treatment time      Minutes:  PT Concurrent Minutes  Time In: 0957  Time Out: 1052  Minutes: 55  Timed Code Treatment Minutes: 9 Minutes  Procedure Minutes: 36 eval, 10 estim      Timed Activity Minutes Units   Ther Ex 5 1   Manual  4 0       Electronically signed by Krysten Champagne, PT on 12/5/22 at 10:56 AM EST

## 2022-12-06 ENCOUNTER — HOSPITAL ENCOUNTER (OUTPATIENT)
Dept: PHYSICAL THERAPY | Age: 67
Setting detail: THERAPIES SERIES
Discharge: HOME | End: 2022-12-06
Payer: MEDICARE

## 2022-12-06 PROCEDURE — 97110 THERAPEUTIC EXERCISES: CPT

## 2022-12-06 PROCEDURE — 97140 MANUAL THERAPY 1/> REGIONS: CPT

## 2022-12-06 ASSESSMENT — PAIN DESCRIPTION - LOCATION: LOCATION: NECK

## 2022-12-06 ASSESSMENT — PAIN DESCRIPTION - ORIENTATION: ORIENTATION: LEFT

## 2022-12-06 ASSESSMENT — PAIN DESCRIPTION - DESCRIPTORS: DESCRIPTORS: ACHING;TIGHTNESS

## 2022-12-06 ASSESSMENT — PAIN SCALES - GENERAL: PAINLEVEL_OUTOF10: 5

## 2022-12-06 NOTE — PROGRESS NOTES
[facility]  Outpatient Physical Therapy    Treatment Note        Date: 2022  Patient: Kylee Raphael  : 1955   Confirmed: Yes  MRN: [id number]  Referring Provider: CHAU Friedman CNP    Medical Diagnosis: Torticollis [M43.6]       Treatment Diagnosis: neck pain, postural abnormality, decreased cervical ROM    Visit Information:  Insurance: Payor: Yonatan Nails / Plan: [facility] PPO / Product Type: Medicare /   PT Visit Information  Onset Date: 22  PT Insurance Information: Noénahum VouchAR Medicare  Total # of Visits Approved:  (medical necessity)  Total # of Visits to Date: 2  Plan of Care/Certification Expiration Date: 23  No Show: 0  Progress Note Due Date: 23  Canceled Appointment: 0  Progress Note Counter: 210    Subjective Information:  Subjective: Pt presenting to appt reporting 5/10 pain level in Lt>Rt side of neck. HEP Compliance:  [x] Good [] Fair [] Poor [] Reports not doing due to:    Pain Screening  Patient Currently in Pain: Yes  Pain Assessment: 0-10  Pain Level: 5  Pain Location: Neck  Pain Orientation: Left  Pain Descriptors: Aching, Tightness    Treatment:  Exercises:  Exercises  Exercise 1: posture exs x10  Exercise 2: chin tucks  Exercise 3: * prone scap  Exercise 4: pec str in doorway 3x30\" \"W\"  Exercise 5: UBE retro 4 min L1  Exercise 6: cervical AROM x10 ea all planes  Exercise 7: UT str 3x30\"  Exercise 8: levator str 3x30\"  Exercise 9: thoracic open book x10  Exercise 10: * Tband rows, lats  Exercise 11: Supine chest pulls 2x10 YTB  Exercise 20: HEP: UT/levator stretches, corner stretch, chin tucks  Treatment Reasoning  Limitations addressed:  Mobility, Strength, Flexibility, Activity tolerance, Posture    Manual:   Manual Therapy  Soft Tissue Mobilizaton: tennis ball/ STM b/l UT, sub occ  Other: Passive UT stretching     Modalities:  Pt declined     Objective Measures:      Strength: [x] NT  [] MMT completed:     ROM: [x] NT  [] ROM measurements:      Assessment: Body Structures, Functions, Activity Limitations Requiring Skilled Therapeutic Intervention: Decreased ROM, Decreased strength, Decreased posture, Increased pain  Assessment: Initiated PT program per POC w/ focus on reducing neck pain through addressing areas of ROM, posture and strength. HEP was further established for consistency of exs. Pain dec from initial 5/10 to 3-4/10 post tx, declined need for modalities noting most relief after manual STM and stretching. Treatment Diagnosis: neck pain, postural abnormality, decreased cervical ROM  Therapy Prognosis: Good     Post-Pain Assessment:       Pain Rating (0-10 pain scale):   3-4/10   Location and pain description same as pre-treatment unless indicated. Action: [] NA   [x] Perform HEP  [] Meds as prescribed  [x] Modalities as prescribed   [] Call Physician     GOALS   Patient Goal(s): Patient Goals : loosen up, be able to turn head better    Short Term Goals Completed by 3 wks Goal Status   STG 1 Independent with HEP to promote home management of symptoms In progress   STG 2 Report 25% reduction in symptoms with improved ability to rotate head to left during driving In progress     Long Term Goals Completed by 5 wks Goal Status   LTG 1 Improve UE and posture strength >/= 4+/5 to improve ease with lifting and carrying objects In progress   LTG 2 Improve cervical AROM WFL to improve ease with driving and all IADLs In progress   LTG 3 NDI </= 5 to demonstrate improved overall activity tolerance In progress     Plan:  Frequency/Duration:  Plan  Plan Frequency: 2  Plan weeks: 5  Current Treatment Recommendations: Strengthening, ROM, Manual, Home exercise program, Patient/Caregiver education & training, Modalities, Positioning  Pt to continue current HEP. See objective section for any therapeutic exercise changes, additions or modifications this date.     Therapy Time:      PT Individual Minutes  Time In: 1430  Time Out: 34 [facility]  Minutes: 38  Timed Code Treatment Minutes: 38 Minutes  Procedure Minutes: N/A   Timed Activity Minutes Units   Ther Ex 28 2   Manual  10 1     Electronically signed by Mini Leija PTA on 12/6/22 at 3:14 PM EST

## 2022-12-12 ENCOUNTER — HOSPITAL ENCOUNTER (OUTPATIENT)
Dept: PHYSICAL THERAPY | Age: 67
Setting detail: THERAPIES SERIES
Discharge: HOME | End: 2022-12-12
Payer: MEDICARE

## 2022-12-12 PROCEDURE — 97110 THERAPEUTIC EXERCISES: CPT

## 2022-12-12 PROCEDURE — 97140 MANUAL THERAPY 1/> REGIONS: CPT

## 2022-12-12 ASSESSMENT — PAIN DESCRIPTION - DESCRIPTORS: DESCRIPTORS: ACHING;TIGHTNESS

## 2022-12-12 ASSESSMENT — PAIN DESCRIPTION - ORIENTATION: ORIENTATION: LEFT

## 2022-12-12 ASSESSMENT — PAIN DESCRIPTION - LOCATION: LOCATION: NECK

## 2022-12-12 ASSESSMENT — PAIN SCALES - GENERAL: PAINLEVEL_OUTOF10: 3

## 2022-12-12 NOTE — PROGRESS NOTES
[address] [facility]  Outpatient Physical Therapy    Treatment Note        Date: 2022  Patient: Susanne Whatley  :    Confirmed: Yes  MRN: [id number]  Referring Provider: CHAU Pham -*   Secondary Referring Provider (If applicable):     Medical Diagnosis: Torticollis [M43.6]    Treatment Diagnosis: neck pain, postural abnormality, decreased cervical ROM    Visit Information:  Insurance: Payor: Alta Lexii / Plan: Deena Gaines PPO / Product Type: Medicare /   PT Visit Information  Onset Date: 22  PT Insurance Information: [id number] [address]ENDRICK Huang Medicare  Total # of Visits Approved:  (medical necessity)  Total # of Visits to Date: 3  Plan of Care/Certification Expiration Date: 23  No Show: 0  Progress Note Due Date: 23  Canceled Appointment: 0  Progress Note Counter: 3/10    Subjective Information:  Subjective: Pt reports starting to feel better. HEP Compliance:  [x] Good [] Fair [] Poor [] Reports not doing due to:    Pain Screening  Patient Currently in Pain: Yes  Pain Assessment: 0-10  Pain Level: 3  Pain Location: Neck  Pain Orientation: Left  Pain Descriptors: Aching, Tightness    Treatment:  Exercises:  Exercises  Exercise 2: chin tucks x10  Exercise 3: * prone scap  Exercise 4: pec str in doorway 3x30\" \"W\"  Exercise 5: UBE retro 4 min L1.4  Exercise 6: cervical AROM x10 ea all planes  Exercise 7: UT str 3x30\"  Exercise 8: levator str 3x30\"  Exercise 9: thoracic open book x10  Exercise 10: Tband rows, lats with RTB x10  Exercise 11: Supine chest pulls 2x10 YTB  Exercise 20: HEP: UT/levator stretches, corner stretch, chin tucks  Treatment Reasoning  Limitations addressed: Mobility, Strength, Flexibility, Activity tolerance, Posture    Manual:   Manual Therapy  Soft Tissue Mobilizaton: tennis ball/ STM b/l UT, sub occ        Assessment:    Body Structures, Functions, Activity Limitations Requiring Skilled Therapeutic Intervention: Decreased ROM, Decreased strength, Decreased posture, Increased pain  Assessment: Contiued with current program and progressing as tolerated to increased cervical ROM and strength. Initiated rows and lats to improve posture and strength with cues for technqiue. Continued with STM to UT and sub occ release with inprovement noted in pain 0/10. Pt signed release for KT inhibition taping for NV. Treatment Diagnosis: neck pain, postural abnormality, decreased cervical ROM  Therapy Prognosis: Good     Post-Pain Assessment:       Pain Rating (0-10 pain scale):   0/10   Location and pain description same as pre-treatment unless indicated. Action: [x] NA   [] Perform HEP  [] Meds as prescribed  [] Modalities as prescribed   [] Call Physician     GOALS   Patient Goal(s): Patient Goals : loosen up, be able to turn head better    Short Term Goals Completed by 3 wks Goal Status   STG 1 Independent with HEP to promote home management of symptoms In progress   STG 2 Report 25% reduction in symptoms with improved ability to rotate head to left during driving In progress       Long Term Goals Completed by 5 wks Goal Status   LTG 1 Improve UE and posture strength >/= 4+/5 to improve ease with lifting and carrying objects In progress   LTG 2 Improve cervical AROM WFL to improve ease with driving and all IADLs In progress   LTG 3 NDI </= 5 to demonstrate improved overall activity tolerance In progress            Plan:  Frequency/Duration:  Plan  Plan Frequency: 2  Plan weeks: 5  Current Treatment Recommendations: Strengthening, ROM, Manual, Home exercise program, Patient/Caregiver education & training, Modalities, Positioning  Pt to continue current HEP. See objective section for any therapeutic exercise changes, additions or modifications this date.     Therapy Time:      PT Individual Minutes  Time In: 1430  Time Out: 4539  Minutes: 44  Timed Code Treatment Minutes: 44 Minutes  Procedure Minutes:0  Timed Activity Minutes Units Ther Ex 36 2   Manual  8 1     Electronically signed by Alex Amaro, MYAH on 12/12/22 at 3:45 PM EST

## 2022-12-13 ENCOUNTER — HOSPITAL ENCOUNTER (OUTPATIENT)
Dept: PHYSICAL THERAPY | Age: 67
Setting detail: THERAPIES SERIES
Discharge: HOME | End: 2022-12-13
Payer: MEDICARE

## 2022-12-13 PROCEDURE — 97110 THERAPEUTIC EXERCISES: CPT

## 2022-12-13 PROCEDURE — 97140 MANUAL THERAPY 1/> REGIONS: CPT

## 2022-12-13 ASSESSMENT — PAIN DESCRIPTION - DESCRIPTORS: DESCRIPTORS: SORE;TIGHTNESS

## 2022-12-13 ASSESSMENT — PAIN SCALES - GENERAL: PAINLEVEL_OUTOF10: 3

## 2022-12-13 ASSESSMENT — PAIN DESCRIPTION - LOCATION: LOCATION: NECK

## 2022-12-13 ASSESSMENT — PAIN DESCRIPTION - ORIENTATION: ORIENTATION: LEFT

## 2022-12-13 NOTE — PROGRESS NOTES
[facility]  Outpatient Physical Therapy    Treatment Note        Date: 2022  Patient: Lord Rodriguez  : 1955   Confirmed: Yes  MRN: [id number]  Referring Provider: CHAU Yuen CNP    Medical Diagnosis: Torticollis [M43.6]       Treatment Diagnosis: neck pain, postural abnormality, decreased cervical ROM    Visit Information:  Insurance: Payor: Opal Whalen / Plan: Tita Kang PPO / Product Type: Medicare /   PT Visit Information  Onset Date: 22  PT Insurance Information: Gumaro Babb Medicare  Total # of Visits Approved:  (medical necessity)  Total # of Visits to Date: 4  Plan of Care/Certification Expiration Date: 23  No Show: 0  Progress Note Due Date: 23  Canceled Appointment: 0  Progress Note Counter: 4/10    Subjective Information:  Subjective: Pt states \"I'm pretty sore. I've been shampooing carpets. \"  HEP Compliance:  [x] Good [] Fair [] Poor [] Reports not doing due to:    Pain Screening  Patient Currently in Pain: Yes  Pain Assessment: 0-10  Pain Level: 3  Pain Location: Neck  Pain Orientation: Left  Pain Descriptors: Sore, Tightness    Treatment:  Exercises:  Exercises  Exercise 2: chin tucks x15, supine  Exercise 3: prone scap 4 way x10 ea  Exercise 4: pec str in doorway 3x30\" \"W\"  Exercise 5: UBE retro 4 min L1.4  Exercise 7: UT str 3x30\"  Exercise 8: levator str 3x30\"  Exercise 9: thoracic open book x10  Exercise 20: HEP: cont current  Treatment Reasoning  Limitations addressed: Mobility, Strength, Flexibility, Activity tolerance, Posture    Manual:   Manual Therapy  Soft Tissue Mobilizaton: STM b/l UT, sub occ  Other: Passive UT stretching       Modalities:  Pt declined    Objective Measures:      Strength: [x] NT  [] MMT completed:     ROM: [x] NT  [] ROM measurements:     Assessment:    Body Structures, Functions, Activity Limitations Requiring Skilled Therapeutic Intervention: Decreased ROM, Decreased strength, Decreased posture, Increased pain  Assessment: Cont'd to progress postural strengthening w/ transition from supine chest pulls to prone scap series. Good casandra despite notable weakness in LT. Unable to initiate KT today D/T clinic out of stock; will trial when able. Pt cont's to experience relief of muscle soreness/tightness w/ manual intervention declining need for modalities post tx. Treatment Diagnosis: neck pain, postural abnormality, decreased cervical ROM  Therapy Prognosis: Good     Post-Pain Assessment:       Pain Rating (0-10 pain scale):   0/10   Location and pain description same as pre-treatment unless indicated. Action: [x] NA   [] Perform HEP  [] Meds as prescribed  [] Modalities as prescribed   [] Call Physician     GOALS   Patient Goal(s): Patient Goals : loosen up, be able to turn head better    Short Term Goals Completed by 3 wks Goal Status   STG 1 Independent with HEP to promote home management of symptoms In progress   STG 2 Report 25% reduction in symptoms with improved ability to rotate head to left during driving In progress     Long Term Goals Completed by 5 wks Goal Status   LTG 1 Improve UE and posture strength >/= 4+/5 to improve ease with lifting and carrying objects In progress   LTG 2 Improve cervical AROM WFL to improve ease with driving and all IADLs In progress   LTG 3 NDI </= 5 to demonstrate improved overall activity tolerance In progress     Plan:  Frequency/Duration:  Plan  Plan Frequency: 2  Plan weeks: 5  Current Treatment Recommendations: Strengthening, ROM, Manual, Home exercise program, Patient/Caregiver education & training, Modalities, Positioning  Pt to continue current HEP. See objective section for any therapeutic exercise changes, additions or modifications this date.     Therapy Time:      PT Individual Minutes  Time In: 5214  Time Out: 1510  Minutes: 38  Timed Code Treatment Minutes: 38 Minutes  Procedure Minutes: N/A  Timed Activity Minutes Units   Ther Ex 28 2   Manual  10 1 185.42 Electronically signed by Cholo Cooper PTA on 12/13/22 at 4:20 PM EST

## 2022-12-19 ENCOUNTER — HOSPITAL ENCOUNTER (OUTPATIENT)
Dept: PHYSICAL THERAPY | Age: 67
Setting detail: THERAPIES SERIES
Discharge: HOME | End: 2022-12-19
Payer: MEDICARE

## 2022-12-19 PROCEDURE — 97110 THERAPEUTIC EXERCISES: CPT

## 2022-12-19 ASSESSMENT — PAIN DESCRIPTION - LOCATION: LOCATION: NECK

## 2022-12-19 ASSESSMENT — PAIN SCALES - GENERAL: PAINLEVEL_OUTOF10: 3

## 2022-12-19 ASSESSMENT — PAIN DESCRIPTION - ORIENTATION: ORIENTATION: LEFT

## 2022-12-19 ASSESSMENT — PAIN DESCRIPTION - DESCRIPTORS: DESCRIPTORS: SORE

## 2022-12-19 NOTE — PROGRESS NOTES
[facility]  Outpatient Physical Therapy    Treatment Note        Date: 2022  Patient: Linda Wilkes  : 1955   Confirmed: Yes  MRN: [id number]  Referring Provider: CHAU Julian CNP    Medical Diagnosis: Torticollis [M43.6]       Treatment Diagnosis: neck pain, postural abnormality, decreased cervical ROM    Visit Information:  Insurance: Payor: Christina Christensen / Plan: Theresa Edouard PPO / Product Type: Medicare /   PT Visit Information  Onset Date: 22  PT Insurance Information: Jesi Noblemartha Medicare  Total # of Visits Approved:  (medical necessity)  Total # of Visits to Date: 5  Plan of Care/Certification Expiration Date: 23  No Show: 0  Progress Note Due Date: 23  Canceled Appointment: 0  Progress Note Counter: 5/10    Subjective Information:  Subjective: Pt reports weekend of [place] activities therefore, has taken 2 days off from [address] Current pain noted as \"not too bad, maybe a 3.\"  HEP Compliance:  [x] Good [] Fair [] Poor [] Reports not doing due to:    Pain Screening  Patient Currently in Pain: Yes  Pain Assessment: 0-10  Pain Level: 3  Pain Location: Neck  Pain Orientation: Left  Pain Descriptors: Sore    Treatment:  Exercises:  Exercises  Exercise 1: Cervical rot w/ towel 5\"x10  Exercise 2: chin tucks x15, supine  Exercise 3: prone scap 4 way x10 ea (level 1 LT \"group home positioning\")  Exercise 4: pec str in doorway 3x30\" \"W\"  Exercise 5: UBE retro 4 min L1.4  Exercise 6: SCM stretch*  Exercise 7: UT str 3x30\"  Exercise 8: levator str 3x30\"  Exercise 9: thoracic open book x10  Exercise 10: Tband rows, lats with GTB 2x10 ea  Exercise 20: HEP: cont current+ cervical rot w/ towel  Treatment Reasoning  Limitations addressed:  Mobility, Strength, Flexibility, Activity tolerance, Posture    Objective Measures:      Strength: [x] NT  [] MMT completed:      ROM: [] NT  [x] ROM measurements:   AROM Cervical Spine   Cervical flexion: 65  Cervical extension: 54  Cervical right lateral: 32  Cervical left lateral: 32  Cervical right rotation: 65 (b/l)     Manual: KT application to b/l UT's 5 min       Assessment: Body Structures, Functions, Activity Limitations Requiring Skilled Therapeutic Intervention: Decreased ROM, Decreased strength, Decreased posture, Increased pain  Assessment: Addition of towel assisted cervical rotation stretch to ease discomfort into Lt neck rotation; good response. Trial of KT b/l UT's to further reduce pain and mm tightness following txs. Removal instructions and precautions were reviewed w/ pt; consent was signed. Treatment Diagnosis: neck pain, postural abnormality, decreased cervical ROM  Therapy Prognosis: Good     Post-Pain Assessment:       Pain Rating (0-10 pain scale):   0/10 \"it's ok. \"  Location and pain description same as pre-treatment unless indicated. Action: [] NA   [x] Perform HEP  [] Meds as prescribed  [] Modalities as prescribed   [] Call Physician     GOALS   Patient Goal(s): Patient Goals : loosen up, be able to turn head better    Short Term Goals Completed by 3 wks Goal Status   STG 1 Independent with HEP to promote home management of symptoms In progress   STG 2 Report 25% reduction in symptoms with improved ability to rotate head to left during driving In progress     Long Term Goals Completed by 5 wks Goal Status   LTG 1 Improve UE and posture strength >/= 4+/5 to improve ease with lifting and carrying objects In progress   LTG 2 Improve cervical AROM WFL to improve ease with driving and all IADLs In progress   LTG 3 NDI </= 5 to demonstrate improved overall activity tolerance In progress     Plan:  Frequency/Duration:  Plan  Plan Frequency: 2  Plan weeks: 5  Current Treatment Recommendations: Strengthening, ROM, Manual, Home exercise program, Patient/Caregiver education & training, Modalities, Positioning  Pt to continue current HEP.   See objective section for any therapeutic exercise changes, additions or modifications this date.     Therapy Time:      PT Individual Minutes  Time In: 4747  Time Out: 2302  Minutes: 49  Timed Code Treatment Minutes: 49 Minutes  Procedure Minutes: N/A   Timed Activity Minutes Units   Ther Ex 45 3   Manual  5 0     Electronically signed by Carmelina Kovacs PTA on 12/19/22 at 3:01 PM EST

## 2022-12-20 ENCOUNTER — HOSPITAL ENCOUNTER (OUTPATIENT)
Dept: PHYSICAL THERAPY | Age: 67
Setting detail: THERAPIES SERIES
Discharge: HOME | End: 2022-12-20
Payer: MEDICARE

## 2022-12-20 PROCEDURE — 97140 MANUAL THERAPY 1/> REGIONS: CPT

## 2022-12-20 PROCEDURE — G0283 ELEC STIM OTHER THAN WOUND: HCPCS

## 2022-12-20 PROCEDURE — 97110 THERAPEUTIC EXERCISES: CPT

## 2022-12-20 ASSESSMENT — PAIN DESCRIPTION - LOCATION: LOCATION: NECK

## 2022-12-20 ASSESSMENT — PAIN SCALES - GENERAL: PAINLEVEL_OUTOF10: 8

## 2022-12-20 ASSESSMENT — PAIN DESCRIPTION - ORIENTATION: ORIENTATION: LEFT

## 2022-12-20 NOTE — PROGRESS NOTES
[facility]  Outpatient Physical Therapy    Treatment Note        Date: 2022  Patient: Dyana Villalobos  :    Confirmed: Yes  MRN: [id number]  Referring Provider: CHAU Milton CNP    Medical Diagnosis: Torticollis [M43.6]       Treatment Diagnosis: neck pain, postural abnormality, decreased cervical ROM    Visit Information:  Insurance: Payor: Steven Alas / Plan: Anton Raza PPO / Product Type: Medicare /   PT Visit Information  Onset Date: 22  PT Insurance Information: Latrice Montesinos Medicare  Total # of Visits Approved:  (medical necessity)  Total # of Visits to Date: 6  Plan of Care/Certification Expiration Date: 23  No Show: 0  Progress Note Due Date: 23  Canceled Appointment: 0  Progress Note Counter: 6/10    Subjective Information:  Subjective: Pt reporting to appt w/ increased neck pain and HA w/ 8/10 level. Pt unable to note aggrivating factors leading to inc pain. Pt unable to determine relief w/ KT application; removed after 6 hrs D/T peeling off after shower. HEP Compliance:  [x] Good [] Fair [] Poor [] Reports not doing due to:    Pain Screening  Patient Currently in Pain: Yes  Pain Assessment: 0-10  Pain Level: 8  Pain Location: Neck  Pain Orientation: Left    Treatment:  Exercises:  Exercises  Exercise 1: Cervical rot w/ towel 5\"x10  Exercise 2: chin tucks x15, supine  Exercise 3: prone scap 4 way x10 ea (level 1 LT \"group home positioning\")  Exercise 4: pec str in doorway 3x30\" \"W\"  Exercise 5: UBE retro 4 min L1.4  Exercise 6: SCM stretch*  Exercise 7: UT str 3x30\" w/ light OP b/l  Exercise 8: levator str 3x30\" w/ light OP b/l  Exercise 9: thoracic open book x10  Exercise 10: Tband rows, lats with GTB 2x10 ea  Exercise 20: HEP: cont current  Treatment Reasoning  Limitations addressed:  Mobility, Strength, Flexibility, Activity tolerance, Posture    Manual:   Manual Therapy  Manual Traction: cervical with relief  Soft Tissue Mobilizaton: STM b/l UT, sub occ  Other: Passive UT/SCM stretching     Modalities:  Moist Heat (CPT 11125)  Patient Position: Supine  Moist heat location: Cervical  Post treatment skin assessment: Intact  Electric stimulation, unattended (CPT 21231) /  (Medicare)  Patient Position: Supine  E-stim location: Cervical  E-stim type: Interferential (IFC)  E-stim via: 4 Electrode Pads  Post treatment skin assessment: Intact  Limitations addressed: Pain modulation, Edema, Tissue extensibility, Joint mobility     *Indicates exercise, modality, or manual techniques to be initiated when appropriate    Objective Measures:      Strength: [x] NT  [] MMT completed:      ROM: [x] NT  [] ROM measurements:    Assessment: Body Structures, Functions, Activity Limitations Requiring Skilled Therapeutic Intervention: Decreased ROM, Decreased strength, Decreased posture, Increased pain  Assessment: Continued to ease Lt cervical rotation mobility w/ addition of SCM stretch. Initial difficulty achieving stretch in seated though modified in supine w/ improved carryover to target muscle. Reinitiated manual interventions including STM and cervical traction w good relief. Pain reducing from initial 8/10 to 4/10 post tx. Held from reapplication of KT tape D/T no significant relief felt following LV. Concluded w/ ES/MH modalities in supine w/ further dec in pain and HA. Inc pain free Lt neck rotation observed w/o trunk compensation post session. Treatment Diagnosis: neck pain, postural abnormality, decreased cervical ROM  Therapy Prognosis: Good     Post-Pain Assessment:       Pain Rating (0-10 pain scale):   2/10   Location and pain description same as pre-treatment unless indicated.    Action: [] NA   [x] Perform HEP  [] Meds as prescribed  [x] Modalities as prescribed   [] Call Physician     GOALS   Patient Goal(s): Patient Goals : loosen up, be able to turn head better    Short Term Goals Completed by 3 wks Goal Status   STG 1 Independent with HEP to promote home management of symptoms In progress   STG 2 Report 25% reduction in symptoms with improved ability to rotate head to left during driving In progress     Long Term Goals Completed by 5 wks Goal Status   LTG 1 Improve UE and posture strength >/= 4+/5 to improve ease with lifting and carrying objects In progress   LTG 2 Improve cervical AROM WFL to improve ease with driving and all IADLs In progress   LTG 3 NDI </= 5 to demonstrate improved overall activity tolerance In progress     Plan:  Frequency/Duration:  Plan  Plan Frequency: 2  Plan weeks: 5  Current Treatment Recommendations: Strengthening, ROM, Manual, Home exercise program, Patient/Caregiver education & training, Modalities, Positioning  Pt to continue current HEP. See objective section for any therapeutic exercise changes, additions or modifications this date.     Therapy Time:      PT Individual Minutes  Time In: 1300  Time Out: 3252  Minutes: 51  Timed Code Treatment Minutes: 41 Minutes  Procedure Minutes: 10 min (MH/ES)   Timed Activity Minutes Units   Ther Ex 26 2   Manual  15 1     Electronically signed by Johnathon Lozano PTA on 12/20/22 at 2:03 PM EST

## 2022-12-26 ENCOUNTER — OFFICE VISIT (OUTPATIENT)
Dept: FAMILY MEDICINE CLINIC | Age: 67
End: 2022-12-26
Payer: MEDICARE

## 2022-12-26 VITALS
DIASTOLIC BLOOD PRESSURE: 82 MMHG | HEIGHT: 65 IN | WEIGHT: 130 LBS | SYSTOLIC BLOOD PRESSURE: 128 MMHG | HEART RATE: 82 BPM | BODY MASS INDEX: 21.66 KG/M2 | OXYGEN SATURATION: 98 %

## 2022-12-26 DIAGNOSIS — B02.9 HERPES ZOSTER WITHOUT COMPLICATION: Primary | ICD-10-CM

## 2022-12-26 PROCEDURE — 1123F ACP DISCUSS/DSCN MKR DOCD: CPT | Performed by: NURSE PRACTITIONER

## 2022-12-26 PROCEDURE — 99213 OFFICE O/P EST LOW 20 MIN: CPT | Performed by: NURSE PRACTITIONER

## 2022-12-26 RX ORDER — VALACYCLOVIR HYDROCHLORIDE 1 G/1
1000 TABLET, FILM COATED ORAL 3 TIMES DAILY
Qty: 21 TABLET | Refills: 0 | Status: SHIPPED | OUTPATIENT
Start: 2022-12-26 | End: 2023-01-02

## 2022-12-26 RX ORDER — CAPSAICIN 0.025 %
CREAM (GRAM) TOPICAL
Qty: 25 G | Refills: 1 | Status: SHIPPED | OUTPATIENT
Start: 2022-12-26 | End: 2023-01-25

## 2022-12-26 ASSESSMENT — ENCOUNTER SYMPTOMS
SORE THROAT: 0
BACK PAIN: 0
RHINORRHEA: 0
DIARRHEA: 0
SHORTNESS OF BREATH: 0
VOMITING: 0
NAUSEA: 0
COUGH: 0
ABDOMINAL PAIN: 0

## 2022-12-26 NOTE — PROGRESS NOTES
Subjective:      Patient ID: Brian Juárez is a 78 y.o. female who presents today for:  Chief Complaint   Patient presents with    Abdominal Pain     Pt states right side abdominal pain since Friday, has taken motrin for pain. HPI  Patient is here with c/o pain to right side for the last 3 days, worse since it started. Woke up with it Friday. Says it painful all the time, 5/10 pain. Says it is a sharp pain and burning at times. Says it feels worse with breathing. Says nothing new with activity. Says she has no other UTI symptoms. Says she has no blood in the urine. Says she has no HX kidney stone. Says she took some AZO and did not help. Says it is no worse with movement. Says certain positions feel  better. Says she has taken Motrin and Tylenol and that has not helped much, dulled the pain. Says she has no GI sx. Says she recently start taking some calcium with D for the last 2 weeks.       Past Medical History:   Diagnosis Date    Osteoarthritis     neck    Seizure ([facility] [address] 12/29/2021     Past Surgical History:   Procedure Laterality Date    US BREAST FINE NEEDLE ASPIRATION       Social History     Socioeconomic History    Marital status:      Spouse name: Not on file    Number of children: Not on file    Years of education: Not on file    Highest education level: Not on file   Occupational History    Not on file   Tobacco Use    Smoking status: Never    Smokeless tobacco: Never   Substance and Sexual Activity    Alcohol use: No    Drug use: No    Sexual activity: Not on file   Other Topics Concern    Not on file   Social History Narrative    Not on file     Social Determinants of Health     Financial Resource Strain: Not on file   Food Insecurity: Not on file   Transportation Needs: Not on file   Physical Activity: Insufficiently Active    Days of Exercise per Week: 2 days    Minutes of Exercise per Session: 20 min   Stress: Not on file   Social Connections: Not on file Intimate Partner Violence: Not on file   Housing Stability: Not on file     Family History   Problem Relation Age of Onset    Diabetes Father     Breast Cancer Neg Hx      Allergies   Allergen Reactions    Pcn [Penicillins] Swelling and Rash     Current Outpatient Medications   Medication Sig Dispense Refill    valACYclovir (VALTREX) 1 g tablet Take 1 tablet by mouth 3 times daily for 7 days 21 tablet 0    capsaicin (ZOSTRIX) 0.025 % cream Apply topically 2 times daily. 25 g 1    butalbital-acetaminophen-caffeine (FIORICET, ESGIC) -40 MG per tablet Take 1 tablet by mouth every 6 hours as needed for Headaches 30 tablet 2    celecoxib (CELEBREX) 200 MG capsule Take 1 capsule by mouth daily 30 capsule 5    levocetirizine (XYZAL) 5 MG tablet Take 1 tablet by mouth daily 30 tablet 5     No current facility-administered medications for this visit. Review of Systems   Constitutional:  Negative for activity change, appetite change, chills, diaphoresis, fatigue, fever and unexpected weight change. HENT:  Negative for congestion, rhinorrhea and sore throat. Respiratory:  Negative for cough and shortness of breath. Cardiovascular:  Negative for chest pain and leg swelling. Gastrointestinal:  Negative for abdominal pain, diarrhea, nausea and vomiting. Genitourinary:  Negative for dysuria, frequency, hematuria and urgency. Musculoskeletal:  Positive for myalgias. Negative for arthralgias, back pain and gait problem. Skin:  Positive for rash (noticed this am). Neurological:  Negative for dizziness, weakness, light-headedness and headaches. Hematological:  Negative for adenopathy. Objective:   /82   Pulse 82   Ht 5' 5\" (1.651 m)   Wt 130 lb (59 kg)   SpO2 98%   BMI 21.63 kg/m²     Physical Exam  Vitals reviewed. Constitutional:       General: She is awake. She is not in acute distress. Appearance: Normal appearance. She is well-developed, well-groomed and normal weight.  She is not ill-appearing, toxic-appearing or diaphoretic. HENT:      Head: Normocephalic and atraumatic. Right Ear: Hearing normal.      Left Ear: Hearing normal.      Mouth/Throat:      Lips: Pink. Eyes:      General: Lids are normal.   Neck:      Trachea: Trachea normal.   Cardiovascular:      Rate and Rhythm: Normal rate and regular rhythm. Pulses: Normal pulses. Heart sounds: Normal heart sounds, S1 normal and S2 normal.   Pulmonary:      Effort: Pulmonary effort is normal.      Breath sounds: Normal breath sounds and air entry. Abdominal:      General: Abdomen is flat. Bowel sounds are normal. There is no distension. Palpations: Abdomen is soft. There is no mass. Tenderness: There is no abdominal tenderness. There is no right CVA tenderness, left CVA tenderness, guarding or rebound. Hernia: No hernia is present. Musculoskeletal:         General: Normal range of motion. Cervical back: Normal range of motion and neck supple. Lymphadenopathy:      Cervical:      Right cervical: No superficial cervical adenopathy. Left cervical: No superficial cervical adenopathy. Skin:     General: Skin is warm and dry. Capillary Refill: Capillary refill takes less than 2 seconds. Findings: Rash present. Rash is vesicular. Comments: 2 small areas of small vesicles with pink base noted to right lateral lower rib area. Pain noted to mid back on the right side and wrapping to right lower rib area. Neurological:      General: No focal deficit present. Mental Status: She is alert and oriented to person, place, and time. Mental status is at baseline. Psychiatric:         Attention and Perception: Attention and perception normal.         Mood and Affect: Mood and affect normal.         Speech: Speech normal.         Behavior: Behavior normal. Behavior is cooperative. Thought Content:  Thought content normal.         Cognition and Memory: Cognition and memory normal.         Judgment: Judgment normal.       Assessment:       Diagnosis Orders   1. Herpes zoster without complication  valACYclovir (VALTREX) 1 g tablet    capsaicin (ZOSTRIX) 0.025 % cream        No results found for this visit on 12/26/22. Plan:     Assessment & Plan   Mohan Garcia was seen today for abdominal pain. Diagnoses and all orders for this visit:    Herpes zoster without complication  -     valACYclovir (VALTREX) 1 g tablet; Take 1 tablet by mouth 3 times daily for 7 days  -     capsaicin (ZOSTRIX) 0.025 % cream; Apply topically 2 times daily. Rash is very mild at this point. Discussed will start oral antiviral to help with symptoms. Will also send capsaicin to help with pain and inflammation. Advised may cont with OTC medications as needed for pain. She does feel Motrin and Tylenol helpful enough at this point. Advised with any drainage to keep area covered. Advised follow up if sx do not improve or worsen. Discussed getting shingles vaccine in the future for prevention. No orders of the defined types were placed in this encounter. Orders Placed This Encounter   Medications    valACYclovir (VALTREX) 1 g tablet     Sig: Take 1 tablet by mouth 3 times daily for 7 days     Dispense:  21 tablet     Refill:  0    capsaicin (ZOSTRIX) 0.025 % cream     Sig: Apply topically 2 times daily. Dispense:  25 g     Refill:  1     There are no discontinued medications. Return for if symptoms do not improve in 7-10 days, worsening of condition. Reviewed with the patient/family: current clinical status & medications. Side effects of the medication prescribed today, as well as treatment plan/rationale and result expectations have been discussed with the patient/family who expresses understanding. Patient will be discharged home in stable condition. Follow up with PCP to evaluate treatment results or return if symptoms worsen or fail to improve.  Discussed signs and symptoms which require immediate follow-up in ED/call to 911. Understanding verbalized. I have reviewed the patient's medical history in detail and updated the computerized patient record.     Pinky Bolden, CHAU - CNP

## 2022-12-27 ENCOUNTER — HOSPITAL ENCOUNTER (OUTPATIENT)
Dept: PHYSICAL THERAPY | Age: 67
Setting detail: THERAPIES SERIES
Discharge: HOME | End: 2022-12-27
Payer: MEDICARE

## 2022-12-27 PROCEDURE — 97110 THERAPEUTIC EXERCISES: CPT

## 2022-12-27 ASSESSMENT — PAIN DESCRIPTION - DESCRIPTORS: DESCRIPTORS: SORE

## 2022-12-27 ASSESSMENT — PAIN DESCRIPTION - LOCATION: LOCATION: NECK

## 2022-12-27 ASSESSMENT — PAIN DESCRIPTION - ORIENTATION: ORIENTATION: LEFT

## 2022-12-27 ASSESSMENT — PAIN SCALES - GENERAL: PAINLEVEL_OUTOF10: 2

## 2022-12-27 NOTE — PROGRESS NOTES
[address] [facility]  Outpatient Physical Therapy    Treatment Note        Date: 2022  Patient: Donnell Decker  :    Confirmed: Yes  MRN: [id number]  Referring Provider: CHAU Worrell -*   Secondary Referring Provider (If applicable):     Medical Diagnosis: Torticollis [M43.6]    Treatment Diagnosis: neck pain, postural abnormality, decreased cervical ROM    Visit Information:  Insurance: Payor: Loyda Nix / Plan: Zoran Pollard PPO / Product Type: Medicare /   PT Visit Information  Onset Date: 22  PT Insurance Information: Elvis Chavez Medicare  Total # of Visits Approved:  (medical necessity)  Total # of Visits to Date: 7  Plan of Care/Certification Expiration Date: 23  No Show: 0  Progress Note Due Date: 23  Canceled Appointment: 0  Progress Note Counter: 7/10    Subjective Information:  Subjective: Pt states \"I'm feeling pretty good. I think I finally got that knot out of my neck because I can actually turn it. \"  HEP Compliance:  [x] Good [] Fair [] Poor [] Reports not doing due to:    Pain Screening  Patient Currently in Pain: Yes  Pain Assessment: 0-10  Pain Level: 2  Pain Location: Neck  Pain Orientation: Left  Pain Descriptors: Sore    Treatment:  Exercises:  Exercises  Exercise 4: pec str in doorway 3x30\" \"W\"  Exercise 5: UBE retro 4 min L2.0  Exercise 6: SCM stretch 3x30\" w/ OP  Exercise 7: UT str 3x30\" w/ light OP b/l  Exercise 8: levator str 3x30\" w/ light OP b/l  Exercise 9: thoracic open book x10  Exercise 10: Tband rows, lats with GTB 2x15 ea  Exercise 11: Standing chest pulls w/ YTB (IR/ER, diagonals and concurrent ER) 10 ea  Exercise 12: Serratus slides x10 YTB, bow and arrows 2x10  YTB  Exercise 20: HEP: cont current  Treatment Reasoning  Limitations addressed:  Mobility, Strength, Flexibility, Activity tolerance, Posture    Objective Measures:      Strength: [x] NT  [] MMT completed:     ROM: [] NT  [x] ROM measurements:   AROM Cervical Spine   Cervical spine general AROM: Lt rot 78 deg  Cervical flexion: 63  Cervical extension: 60  Cervical right lateral: 35  Cervical left lateral: 34  Cervical right rotation: 75      Assessment: Body Structures, Functions, Activity Limitations Requiring Skilled Therapeutic Intervention: Decreased ROM, Decreased strength, Decreased posture, Increased pain  Assessment: Continued to progress postural strengthening w/ addition of TB chest pulls and bow and arrows w/ good casandra. SCM stretch also initiated to improve cervical rotation mobility. ROM was asssessed following stretches noting b/l cervical % WNL's. Pt verbalizes 80% reduction from initial pain and muscle tightness w/ significantly improved ability to rotate head while driving \"without turning my whole body sideways\" per pt. HEP was progressed. Treatment Diagnosis: neck pain, postural abnormality, decreased cervical ROM  Therapy Prognosis: Good     Post-Pain Assessment:       Pain Rating (0-10 pain scale):   2/10   Location and pain description same as pre-treatment unless indicated.    Action: [] NA   [x] Perform HEP  [] Meds as prescribed  [x] Modalities as prescribed   [] Call Physician     GOALS   Patient Goal(s): Patient Goals : loosen up, be able to turn head better    Short Term Goals Completed by 3 wks Goal Status   STG 1 Independent with HEP to promote home management of symptoms In progress   STG 2 Report 25% reduction in symptoms with improved ability to rotate head to left during driving In progress     Long Term Goals Completed by 5 wks Goal Status   LTG 1 Improve UE and posture strength >/= 4+/5 to improve ease with lifting and carrying objects In progress   LTG 2 Improve cervical AROM WFL to improve ease with driving and all IADLs In progress   LTG 3 NDI </= 5 to demonstrate improved overall activity tolerance In progress     Plan:  Frequency/Duration:  Plan  Plan Frequency: 2  Plan weeks: 5  Current Treatment Recommendations: Strengthening, ROM, Manual, Home exercise program, Patient/Caregiver education & training, Modalities, Positioning  Pt to continue current HEP. See objective section for any therapeutic exercise changes, additions or modifications this date.     Therapy Time:      PT Individual Minutes  Time In: 1346  Time Out: 3743  Minutes: 44  Timed Code Treatment Minutes: 44 Minutes  Procedure Minutes: N/A   Timed Activity Minutes Units   Ther Ex 44 3     Electronically signed by Ayad Ross PTA on 12/27/22 at 4:01 PM EST

## 2022-12-29 ENCOUNTER — HOSPITAL ENCOUNTER (OUTPATIENT)
Dept: PHYSICAL THERAPY | Age: 67
Setting detail: THERAPIES SERIES
Discharge: HOME | End: 2022-12-29
Payer: MEDICARE

## 2022-12-29 PROCEDURE — 97110 THERAPEUTIC EXERCISES: CPT

## 2022-12-29 NOTE — PROGRESS NOTES
[address] [facility]  Outpatient Physical Therapy    Treatment Note        Date: 2022  Patient: Mary Gordillo  :    Confirmed: Yes  MRN: [id number]  Referring Provider: CHAU French -*   Secondary Referring Provider (If applicable):     Medical Diagnosis: Torticollis [M43.6]    Treatment Diagnosis: neck pain, postural abnormality, decreased cervical ROM    Visit Information:  Insurance: Payor: Judy Cheney / Plan: [employer] PPO / Product Type: Medicare /   PT Visit Information  Onset Date: 22  PT Insurance Information: Shannon Jefferson Medicare  Total # of Visits Approved:  (medical necessity)  Total # of Visits to Date: 8  Plan of Care/Certification Expiration Date: 23  No Show: 0  Progress Note Due Date: 23  Canceled Appointment: 0  Progress Note Counter: 8/10    Subjective Information:  Subjective: Pt states \"I'm much better. It turns now, we're good. \"  HEP Compliance:  [x] Good [] Fair [] Poor [] Reports not doing due to:    Pain Screening  Patient Currently in Pain: Denies    Treatment:  Exercises:  Exercises  Exercise 4: pec str in doorway 3x30\" 3 way  Exercise 5: UBE retro 4 min L2.0  Exercise 6: Single TB rows 2x10 RTB  Exercise 7: UT str 3x30\" w/ light OP b/l  Exercise 8: Wall push ups 2x10  Exercise 9: Shrugs 2x10 RTB  Exercise 10: Tban lats with BTB 2x15 ea  Exercise 11: Standing chest pulls w/ RTB (IR/ER, diagonals and concurrent ER) x 10 ea  Exercise 12: bow and arrows 2x10  YTB  Exercise 20: HEP: cont current, rows/lats, wall push ups  Treatment Reasoning  Limitations addressed: Mobility, Strength, Flexibility, Activity tolerance, Posture    Objective Measures:      Strength: [x] NT  [] MMT completed:     ROM: [x] NT  [] ROM measurements:     Assessment:    Body Structures, Functions, Activity Limitations Requiring Skilled Therapeutic Intervention: Decreased ROM, Decreased strength, Decreased posture, Increased pain  Assessment: Discussed 2 more visits remaining in schedule w/ pt anticipating readiness for D/C at end of POC. Spent time further progressing postural strengthening and exs to aid in carrying/lifting and push/pull activities to limit neck strain. Good casandra to ex advacement w/ some cues needed for correction of ant shoulder disposition. Treatment Diagnosis: neck pain, postural abnormality, decreased cervical ROM  Therapy Prognosis: Good     Post-Pain Assessment:       Pain Rating (0-10 pain scale):   0/10   Location and pain description same as pre-treatment unless indicated. Action: [x] NA   [] Perform HEP  [] Meds as prescribed  [] Modalities as prescribed   [] Call Physician     GOALS   Patient Goal(s): Patient Goals : loosen up, be able to turn head better    Short Term Goals Completed by 3 wks Goal Status   STG 1 Independent with HEP to promote home management of symptoms In progress   STG 2 Report 25% reduction in symptoms with improved ability to rotate head to left during driving In progress     Long Term Goals Completed by 5 wks Goal Status   LTG 1 Improve UE and posture strength >/= 4+/5 to improve ease with lifting and carrying objects In progress   LTG 2 Improve cervical AROM WFL to improve ease with driving and all IADLs In progress   LTG 3 NDI </= 5 to demonstrate improved overall activity tolerance In progress     Plan:  Frequency/Duration:  Plan  Plan Frequency: 2  Plan weeks: 5  Current Treatment Recommendations: Strengthening, ROM, Manual, Home exercise program, Patient/Caregiver education & training, Modalities, Positioning  Pt to continue current HEP. See objective section for any therapeutic exercise changes, additions or modifications this date.     Therapy Time:      PT Individual Minutes  Time In: 7081  Time Out: 7801  Minutes: 42  Timed Code Treatment Minutes: 42 Minutes  Procedure Minutes: N/A  Timed Activity Minutes Units   Ther Ex 42 3     Electronically signed by Brooke Reid PTA on 12/29/22 at 5:27 PM EST

## 2023-01-03 ENCOUNTER — HOSPITAL ENCOUNTER (OUTPATIENT)
Dept: PHYSICAL THERAPY | Age: 68
Setting detail: THERAPIES SERIES
Discharge: HOME | End: 2023-01-03
Payer: MEDICARE

## 2023-01-03 PROCEDURE — 97110 THERAPEUTIC EXERCISES: CPT

## 2023-01-03 PROCEDURE — 97140 MANUAL THERAPY 1/> REGIONS: CPT

## 2023-01-03 ASSESSMENT — PAIN DESCRIPTION - LOCATION: LOCATION: NECK

## 2023-01-03 NOTE — PROGRESS NOTES
[address]     Ph: [phone]  Fax: [phone]      [] Certification  [] Recertification []  Plan of Care  [] Progress Note [x] Discharge      Referring Provider: CHAU Marlow CNP     From:  Teddy Britton,  PT  Patient: New Jimenez (17 y.o. female) : 1955 Date: 1/3/2023  Medical Diagnosis: Torticollis [M43.6]       Treatment Diagnosis: neck pain, postural abnormality, decreased cervical ROM    Plan of Care/Certification Expiration Date: : 23   Progress Report Period from:  2022  to 1/3/2023    Visits to Date: 9 No Show: 0 Cancelled Appts: 0    OBJECTIVE:   Short Term Goals - Time Frame for Short Term Goals: 3 wks    Goals Current/Discharge status  Status   Short Term Goal 1: Independent with HEP to promote home management of symptoms  Indep/compliant  Met   Short Term Goal 2: Report 25% reduction in symptoms with improved ability to rotate head to left during driving  Pt reports 45% improvement; no limitation w/ turning head to left when driving.   Met     Long Term Goals - Time Frame for Long Term Goals : 5 wks  Goals Current/ Discharge status Status   Long Term Goal 1: Improve UE and posture strength >/= 4+/5 to improve ease with lifting and carrying objects   Strength TERESITA  Comment: Rhmbds, MT, lats 4 to 4+/5, LT 4-/5  R Shoulder Flexion: 5/5  R Shoulder Extension: 5/5  R Shoulder ABduction: 5/5  R Shoulder Internal Rotation: 5/5  R Shoulder External Rotation: 4+/5  R Elbow Flexion: 5/5  R Elbow Extension: 5/5  Strength TASHIA  Comment: Rhmbds, MT, lats 4 to 4+/5, LT 4-/5  L Shoulder Flexion: 5/5  L Shoulder Extension: 5/5  L Shoulder ABduction: 5/5  L Shoulder Internal Rotation: 4+/5  L Shoulder External Rotation: 4+/5  L Elbow Flexion: 5/5  L Elbow Extension: 5/5  L Forearm Pron: 5/5  L Forearm Sup: 5/5 Met   Long Term Goal 2: Improve cervical AROM WFL to improve ease with driving and all IADLs AROM Cervical Spine   Cervical spine general AROM: Lt rot 80  Cervical flexion: 65  Cervical extension: 74  Cervical right lateral: 45  Cervical left lateral: 37  Cervical right rotation: 75  Met   Long Term Goal 3: NDI </= 5 to demonstrate improved overall activity tolerance 3/50  Met     Body Structures, Functions, Activity Limitations Requiring Skilled Therapeutic Intervention: Decreased ROM, Decreased strength, Decreased posture, Increased pain  Assessment: Pt discharging from skilled PT today w/ \"at least 95%\" improvement from initial pain and restriction of Lt cervical rotation. Cervical ROM and UE strength noted to be WFL's w/ LTG's met. Upon assessment, select areas of periscapular weakness remains therefore, discussed appropriate exs to isolate low trap strengthening beyond PT. Pt has been provided w/ advanced HEP to further encourge ROM and strengthening as well as to aid in lifting actvity etc; pt w/ good understanding. Pt 94% functional according to NDI. Pt not expected to F/U w/ referring MD until March. Pt has met all goals and is appropriate for discharge. Recommend continue with HEP. Therapy Prognosis: Good    PLAN:   Additional Comments: D/C this date             Patient Status:[] Continue/ Initiate plan of Care    [x] Discharge PT. Recommend pt continue with HEP. [] Additional visits requested, Please re-certify for additional visits:    [] Hold         Signature: Objective information by: Electronically signed by Monroe Gallego PTA on 1/3/23 at 1:49 PM EST  Electronically signed by Viviane Stewart PT on 1/5/23 at 1:15 PM EST    If you have any questions or concerns, please don't hesitate to call.   Thank you for your referral.

## 2023-01-03 NOTE — PROGRESS NOTES
[address] [facility]  Outpatient Physical Therapy    Treatment Note        Date: 1/3/2023  Patient: Ronald Maier  :    Confirmed: Yes  MRN: [id number]  Referring Provider: CHAU Mayfield -*   Secondary Referring Provider (If applicable):     Medical Diagnosis: Torticollis [M43.6]    Treatment Diagnosis: neck pain, postural abnormality, decreased cervical ROM    Visit Information:  Insurance: Payor: Kathi Tate / Plan: Al Gonzales / Product Type: *No Product type* /   PT Visit Information  Onset Date: 22  PT Insurance Information: Edvin Woodruff Medicare  Total # of Visits Approved:  (medical necessity)  Total # of Visits to Date: 9  Plan of Care/Certification Expiration Date: 23  No Show: 0  Progress Note Due Date: 23  Canceled Appointment: 0  Progress Note Counter: 9/10    Subjective Information:  Subjective: Pt states \"Actually I feel really good. I can turn my head now. I think I can continue the exercises that you gave me on my own. \"  HEP Compliance:  [x] Good [] Fair [] Poor [] Reports not doing due to:  Pain Screening  Patient Currently in Pain: Denies  Pain Assessment: 0-10  Best Pain Level: 0  Worst Pain Level: 3 (3-4 \"not bad\")  Pain Location: Neck    Treatment:  Exercises:  Exercises  Exercise 4: pec str in doorway 3x30\" 3 way  Exercise 5: UBE retro 4 min L2.0  Exercise 6: ROM/MMT for D/C  Exercise 7: Review of HEP  Exercise 8: Pt edu on bicep curls to aid in lifting  Exercise 9: LT setting at wall 5\"x1-, serratus slides w/ YTB x10  Exercise 20: HEP: cont current, LT setting at wall, serratus slides, bicep curls  Treatment Reasoning  Limitations addressed:  Mobility, Strength, Flexibility, Activity tolerance, Posture    Objective Measures:      Strength: [] NT  [x] MMT completed:   Strength RUE  Comment: Rhmbds, MT, lats 4 to 4+/5, LT 4-/5  R Shoulder Flexion: 5/5  R Shoulder Extension: 5/5  R Shoulder ABduction: 5/5  R Shoulder Internal Rotation: 5/5  R Shoulder External Rotation: 4+/5  R Elbow Flexion: 5/5  R Elbow Extension: 5/5  Strength LUE  Comment: Rhmbds, MT, lats 4 to 4+/5, LT 4-/5  L Shoulder Flexion: 5/5  L Shoulder Extension: 5/5  L Shoulder ABduction: 5/5  L Shoulder Internal Rotation: 4+/5  L Shoulder External Rotation: 4+/5  L Elbow Flexion: 5/5  L Elbow Extension: 5/5  L Forearm Pron: 5/5  L Forearm Sup: 5/5     ROM: [] NT  [x] ROM measurements:    AROM Cervical Spine   Cervical spine general AROM: Lt rot 80  Cervical flexion: 65  Cervical extension: 74  Cervical right lateral: 45  Cervical left lateral: 37  Cervical right rotation: 75      Assessment: Body Structures, Functions, Activity Limitations Requiring Skilled Therapeutic Intervention: Decreased ROM, Decreased strength, Decreased posture, Increased pain  Assessment: Pt discharging from skilled PT today w/ \"at least 95%\" improvement from initial pain and restriction of Lt cervical rotation. Cervical ROM and UE strength noted to be WFL's w/ LTG's met. Upon assessment, select areas of periscapular weakness remains therefore, discussed appropriate exs to isolate low trap strengthening beyond PT. Pt has been provided w/ advanced HEP to further encourge ROM and strengthening as well as to aid in lifting actvity etc; pt w/ good understanding. Pt 94% functional according to NDI. Pt not expected to F/U w/ referring MD until March. Treatment Diagnosis: neck pain, postural abnormality, decreased cervical ROM  Therapy Prognosis: Good     Post-Pain Assessment:       Pain Rating (0-10 pain scale):   0/10   Location and pain description same as pre-treatment unless indicated.    Action: [x] NA   [] Perform HEP  [] Meds as prescribed  [] Modalities as prescribed   [] Call Physician     GOALS   Patient Goal(s): Patient Goals : loosen up, be able to turn head better    Short Term Goals Completed by 3 wks Goal Status   STG 1 Independent with HEP to promote home management of symptoms In progress   STG 2 Report 25% reduction in symptoms with improved ability to rotate head to left during driving Met     Long Term Goals Completed by 5 wks Goal Status   LTG 1 Improve UE and posture strength >/= 4+/5 to improve ease with lifting and carrying objects Met   LTG 2 Improve cervical AROM WFL to improve ease with driving and all IADLs Met   LTG 3 NDI </= 5 to demonstrate improved overall activity tolerance Met     Plan:  Frequency/Duration:  Plan  Additional Comments: D/C this date  Pt to continue current HEP. See objective section for any therapeutic exercise changes, additions or modifications this date.     Therapy Time:      PT Individual Minutes  Time In: 1816  Time Out: 4564  Minutes: 29  Timed Code Treatment Minutes: 29 Minutes  Procedure Minutes: N/A   Timed Activity Minutes Units   Ther Ex 19 1   Manual  10 1     Electronically signed by Kyler Bal PTA on 1/3/23 at 1:42 PM EST

## 2023-01-05 ENCOUNTER — APPOINTMENT (OUTPATIENT)
Dept: PHYSICAL THERAPY | Age: 68
End: 2023-01-05
Payer: MEDICARE

## 2023-01-10 ENCOUNTER — ANESTHESIA EVENT (OUTPATIENT)
Dept: ENDOSCOPY | Age: 68
End: 2023-01-10
Payer: MEDICARE

## 2023-01-10 ENCOUNTER — HOSPITAL ENCOUNTER (OUTPATIENT)
Age: 68
Setting detail: OUTPATIENT SURGERY
Discharge: HOME | End: 2023-01-10
Attending: SPECIALIST | Admitting: SPECIALIST
Payer: MEDICARE

## 2023-01-10 ENCOUNTER — ANESTHESIA (OUTPATIENT)
Dept: ENDOSCOPY | Age: 68
End: 2023-01-10
Payer: MEDICARE

## 2023-01-10 VITALS
TEMPERATURE: 97.6 F | WEIGHT: 128 LBS | BODY MASS INDEX: 21.33 KG/M2 | SYSTOLIC BLOOD PRESSURE: 144 MMHG | DIASTOLIC BLOOD PRESSURE: 70 MMHG | OXYGEN SATURATION: 96 % | HEART RATE: 86 BPM | HEIGHT: 65 IN | RESPIRATION RATE: 18 BRPM

## 2023-01-10 DIAGNOSIS — R19.5 POSITIVE COLORECTAL CANCER SCREENING USING COLOGUARD TEST: ICD-10-CM

## 2023-01-10 PROCEDURE — 3700000000 HC ANESTHESIA ATTENDED CARE: Performed by: SPECIALIST

## 2023-01-10 PROCEDURE — 88305 TISSUE EXAM BY PATHOLOGIST: CPT

## 2023-01-10 PROCEDURE — 7100000011 HC PHASE II RECOVERY - ADDTL 15 MIN: Performed by: SPECIALIST

## 2023-01-10 PROCEDURE — 6370000000 HC RX 637 (ALT 250 FOR IP): Performed by: SPECIALIST

## 2023-01-10 PROCEDURE — 2709999900 HC NON-CHARGEABLE SUPPLY: Performed by: SPECIALIST

## 2023-01-10 PROCEDURE — 3700000001 HC ADD 15 MINUTES (ANESTHESIA): Performed by: SPECIALIST

## 2023-01-10 PROCEDURE — 7100000010 HC PHASE II RECOVERY - FIRST 15 MIN: Performed by: SPECIALIST

## 2023-01-10 PROCEDURE — 2580000003 HC RX 258: Performed by: SPECIALIST

## 2023-01-10 PROCEDURE — 6360000002 HC RX W HCPCS: Performed by: NURSE ANESTHETIST, CERTIFIED REGISTERED

## 2023-01-10 PROCEDURE — 2580000003 HC RX 258

## 2023-01-10 PROCEDURE — 45380 COLONOSCOPY AND BIOPSY: CPT | Performed by: SPECIALIST

## 2023-01-10 PROCEDURE — 3609027000 HC COLONOSCOPY: Performed by: SPECIALIST

## 2023-01-10 RX ORDER — IBUPROFEN 200 MG
200 TABLET ORAL EVERY 6 HOURS PRN
COMMUNITY

## 2023-01-10 RX ORDER — PROPOFOL 10 MG/ML
INJECTION, EMULSION INTRAVENOUS PRN
Status: DISCONTINUED | OUTPATIENT
Start: 2023-01-10 | End: 2023-01-10 | Stop reason: SDUPTHER

## 2023-01-10 RX ORDER — SODIUM CHLORIDE 9 MG/ML
INJECTION, SOLUTION INTRAVENOUS
Status: COMPLETED
Start: 2023-01-10 | End: 2023-01-10

## 2023-01-10 RX ORDER — SIMETHICONE 20 MG/.3ML
EMULSION ORAL PRN
Status: DISCONTINUED | OUTPATIENT
Start: 2023-01-10 | End: 2023-01-10 | Stop reason: ALTCHOICE

## 2023-01-10 RX ORDER — SIMETHICONE 20 MG/.3ML
EMULSION ORAL
Status: DISCONTINUED
Start: 2023-01-10 | End: 2023-01-10 | Stop reason: HOSPADM

## 2023-01-10 RX ORDER — MAGNESIUM HYDROXIDE 1200 MG/15ML
LIQUID ORAL PRN
Status: DISCONTINUED | OUTPATIENT
Start: 2023-01-10 | End: 2023-01-10 | Stop reason: ALTCHOICE

## 2023-01-10 RX ORDER — SODIUM CHLORIDE 9 MG/ML
INJECTION, SOLUTION INTRAVENOUS CONTINUOUS
Status: DISCONTINUED | OUTPATIENT
Start: 2023-01-10 | End: 2023-01-10 | Stop reason: HOSPADM

## 2023-01-10 RX ADMIN — PROPOFOL 100 MG: 10 INJECTION, EMULSION INTRAVENOUS at 07:32

## 2023-01-10 RX ADMIN — PROPOFOL 50 MG: 10 INJECTION, EMULSION INTRAVENOUS at 07:34

## 2023-01-10 RX ADMIN — PROPOFOL 50 MG: 10 INJECTION, EMULSION INTRAVENOUS at 07:53

## 2023-01-10 RX ADMIN — PROPOFOL 50 MG: 10 INJECTION, EMULSION INTRAVENOUS at 07:41

## 2023-01-10 RX ADMIN — SODIUM CHLORIDE: 9 INJECTION, SOLUTION INTRAVENOUS at 07:03

## 2023-01-10 RX ADMIN — PROPOFOL 50 MG: 10 INJECTION, EMULSION INTRAVENOUS at 06:48

## 2023-01-10 ASSESSMENT — PAIN - FUNCTIONAL ASSESSMENT
PAIN_FUNCTIONAL_ASSESSMENT: 0-10
PAIN_FUNCTIONAL_ASSESSMENT: ACTIVITIES ARE NOT PREVENTED

## 2023-01-10 ASSESSMENT — PAIN DESCRIPTION - DESCRIPTORS: DESCRIPTORS: BURNING

## 2023-01-10 ASSESSMENT — PAIN SCALES - GENERAL: PAINLEVEL_OUTOF10: 0

## 2023-01-10 NOTE — ANESTHESIA POSTPROCEDURE EVALUATION
Department of Anesthesiology  Postprocedure Note    Patient: Kylee Raphael  MRN: [id number]  YOB: 1955  Date of evaluation: 1/10/2023      Procedure Summary     Date: 01/10/23 Room / Location: [address] [place] [address] 01 / Namon Asp    Anesthesia Start: 0730 Anesthesia Stop: 8790    Procedure: COLONOSCOPY DIAGNOSTIC Diagnosis:       Positive colorectal cancer screening using Cologuard test      (Positive colorectal cancer screening using Cologuard test [R19.5])    Surgeons: Beckey Gaucher, MD Responsible Provider: CHAU Santana CRNA    Anesthesia Type: MAC ASA Status: 2          Anesthesia Type: No value filed.     Chrissie Phase I: Chrissie Score: 10    Chrissie Phase II:        Anesthesia Post Evaluation    Patient location during evaluation: bedside  Patient participation: complete - patient participated  Level of consciousness: awake and alert  Airway patency: patent  Nausea & Vomiting: no nausea and no vomiting  Complications: no  Cardiovascular status: blood pressure returned to baseline  Respiratory status: acceptable  Hydration status: euvolemic

## 2023-01-10 NOTE — PROGRESS NOTES
Steph Joel Fus stopped at bedside and spoke with patient after procedure. Discharge instructions given, patient verbalized understanding.

## 2023-01-10 NOTE — H&P
Patient Name: Kylee Raphael  :   MRN: [id number]  DATE: 01/10/23      ENDOSCOPY  History and Physical    Procedure:    [] Diagnostic Colonoscopy       [x] Screening Colonoscopy  [] EGD      [] ERCP      [] EUS       [] Other    [x] Previous office notes/History and Physical reviewed from the patients chart. Please see EMR for further details of HPI. I have examined the patient's status immediately prior to the procedure and:      Indications/HPI:    []Abdominal Pain  []Cancer- GI/Lung  []Fhx of colon CA/polyps  []History of Polyps  []Oneils   []Melena  []Abnormal Imaging  []Dysphagia    []Persistent Pneumonia  []Anemia  []Food Impaction  []History of Polyps  []GI Bleed  []Pulmonary nodule/Mass  []Change in bowel habits []Heartburn/Reflux  []Rectal Bleed (BRBPR)  []Chest Pain - Non Cardiac []Heme (+) Stoo  l[]Ulcers  []Constipation  []Hemoptysis   []Varices  []Diarrhea  []Hypoxemia  []Nausea/Vomiting  []Screening   []Crohns/Colitis  []Other:     Anesthesia:   [x] MAC [] Moderate Sedation   [] General   [] None     ROS: 12 pt Review of Symptoms was negative unless mentioned above    Medications:   Prior to Admission medications    Medication Sig Start Date End Date Taking? Authorizing Provider   ibuprofen (ADVIL;MOTRIN) 200 MG tablet Take 200 mg by mouth every 6 hours as needed for Pain   Yes Historical Provider, MD   capsaicin (ZOSTRIX) 0.025 % cream Apply topically 2 times daily. 22  CHAU Lara CNP   butalbital-acetaminophen-caffeine (FIORICET, ESGIC) -63 MG per tablet Take 1 tablet by mouth every 6 hours as needed for Headaches 10/26/22 1/24/23  CHAU Calderon CNP   celecoxib (CELEBREX) 200 MG capsule Take 1 capsule by mouth daily 10/26/22   CHAU Calderon CNP   levocetirizine (XYZAL) 5 MG tablet Take 1 tablet by mouth daily 10/26/22   CHAU Calderon CNP       Allergies:    Allergies   Allergen Reactions    Pcn [Penicillins] Swelling and Rash History of allergic reaction to anesthesia:  No    Past Medical History:  Past Medical History:   Diagnosis Date    Osteoarthritis     neck    Seizure ([address] 12/29/2021       Past Surgical History:  Past Surgical History:   Procedure Laterality Date    US BREAST FINE NEEDLE ASPIRATION         Social History:  Social History     Tobacco Use    Smoking status: Never    Smokeless tobacco: Never   Substance Use Topics    Alcohol use: No     Comment: occasionally    Drug use: No       Vital Signs:   Vitals:    01/10/23 0655   BP: (!) 148/78   Pulse: (!) 105   Resp: 18   Temp: 97.6 °F (36.4 °C)   SpO2: 98%        Physical Exam:  Cardiac:  [x]WNL  []Comments:  Pulmonary:  [x]WNL   []Comments:   Neuro/Mental Status:  [x]WNL  []Comments:  Abdominal:  [x]WNL    []Comments:  Other:   []WNL  []Comments:    Informed Consent:  The risks and benefits of the procedure have been discussed with either the patient or if they cannot consent, their representative. Assessment:  Patient examined and appropriate for planned sedation and procedure. Plan:  Proceed with planned sedation and procedure as above.     Sheba Adler MD  7:31 AM

## 2023-01-10 NOTE — ANESTHESIA PRE PROCEDURE
Department of Anesthesiology  Preprocedure Note       Name:  Brian Juárez   Age:  78 y.o.  :  1955                                          MRN:  [id number]         Date:  1/10/2023      Surgeon: Mariluz Pabon):  Umer Ward MD    Procedure: Procedure(s):  COLONOSCOPY DIAGNOSTIC    Medications prior to admission:   Prior to Admission medications    Medication Sig Start Date End Date Taking? Authorizing Provider   ibuprofen (ADVIL;MOTRIN) 200 MG tablet Take 200 mg by mouth every 6 hours as needed for Pain   Yes Historical Provider, MD   capsaicin (ZOSTRIX) 0.025 % cream Apply topically 2 times daily. 22  CHAU Ugarte CNP   butalbital-acetaminophen-caffeine (FIORICET, ESGIC) -57 MG per tablet Take 1 tablet by mouth every 6 hours as needed for Headaches 10/26/22 1/24/23  Sherol ShellsCHAU - CNP   celecoxib (CELEBREX) 200 MG capsule Take 1 capsule by mouth daily 10/26/22   Sherol ShellsCHAU - CNP   levocetirizine (XYZAL) 5 MG tablet Take 1 tablet by mouth daily 10/26/22   Royerol ShellsCHAU CNP       Current medications:    Current Facility-Administered Medications   Medication Dose Route Frequency Provider Last Rate Last Admin    sodium chloride 0.9 % infusion             0.9 % sodium chloride infusion   IntraVENous Continuous Umer Ward MD           Allergies:     Allergies   Allergen Reactions    Pcn [Penicillins] Swelling and Rash       Problem List:    Patient Active Problem List   Diagnosis Code    Seizure ([facility] [address] R56.9       Past Medical History:        Diagnosis Date    Osteoarthritis     neck    Seizure ([facility] [address] 2021       Past Surgical History:        Procedure Laterality Date    US BREAST FINE NEEDLE ASPIRATION         Social History:    Social History     Tobacco Use    Smoking status: Never    Smokeless tobacco: Never   Substance Use Topics    Alcohol use: No     Comment: occasionally                                Counseling given: Not Answered      Vital Signs (Current):   Vitals:    01/10/23 0655   BP: (!) 148/78   Pulse: (!) 105   Resp: 18   Temp: 36.4 °C (97.6 °F)   TempSrc: Temporal   SpO2: 98%   Weight: 128 lb (58.1 kg)   Height: 5' 5\" (1.651 m)                                              BP Readings from Last 3 Encounters:   01/10/23 (!) 148/78   12/26/22 128/82   10/26/22 110/82       NPO Status: Time of last liquid consumption: 2330                        Time of last solid consumption: 2200                        Date of last liquid consumption: 01/09/23                        Date of last solid food consumption: 01/08/23    BMI:   Wt Readings from Last 3 Encounters:   01/10/23 128 lb (58.1 kg)   12/26/22 130 lb (59 kg)   10/26/22 132 lb (59.9 kg)     Body mass index is 21.3 kg/m². CBC:   Lab Results   Component Value Date/Time    WBC 5.9 01/11/2022 03:09 PM    RBC 3.51 01/11/2022 03:09 PM    HGB 11.0 01/11/2022 03:09 PM    HCT 32.6 01/11/2022 03:09 PM    MCV 92.8 01/11/2022 03:09 PM    RDW 13.2 01/11/2022 03:09 PM     01/11/2022 03:09 PM       CMP:   Lab Results   Component Value Date/Time     01/11/2022 03:09 PM    K 4.4 01/11/2022 03:09 PM    K 3.8 12/30/2021 07:05 AM     01/11/2022 03:09 PM    CO2 27 01/11/2022 03:09 PM    BUN 26 01/11/2022 03:09 PM    CREATININE 0.71 01/11/2022 03:09 PM    GFRAA >60.0 01/11/2022 03:09 PM    LABGLOM >60.0 01/11/2022 03:09 PM    GLUCOSE 88 01/11/2022 03:09 PM    PROT 7.3 01/11/2022 03:09 PM    CALCIUM 9.5 01/11/2022 03:09 PM    BILITOT <0.2 01/11/2022 03:09 PM    ALKPHOS 76 01/11/2022 03:09 PM    AST 12 01/11/2022 03:09 PM    ALT 19 01/11/2022 03:09 PM       POC Tests: No results for input(s): POCGLU, POCNA, POCK, POCCL, POCBUN, POCHEMO, POCHCT in the last 72 hours.     Coags:   Lab Results   Component Value Date/Time    PROTIME 13.2 12/29/2021 05:45 AM    INR 1.0 12/29/2021 05:45 AM    APTT 38.2 12/29/2021 05:45 AM       HCG (If Applicable): No results found for: PREGTESTUR, PREGSERUM, HCG, HCGQUANT     ABGs: No results found for: PHART, PO2ART, VCF3CXY, ASL6ZFX, BEART, P4XKUGSG     Type & Screen (If Applicable):  No results found for: LABABO, LABRH    Drug/Infectious Status (If Applicable):  No results found for: HIV, HEPCAB    COVID-19 Screening (If Applicable):   Lab Results   Component Value Date/Time    COVID19 DETECTED 12/29/2021 06:08 AM    COVID19 Not Detected 06/22/2021 07:44 PM           Anesthesia Evaluation  Patient summary reviewed and Nursing notes reviewed  Airway: Mallampati: II  TM distance: >3 FB   Neck ROM: full  Mouth opening: > = 3 FB   Dental:          Pulmonary:Negative Pulmonary ROS and normal exam                               Cardiovascular:Negative CV ROS                      Neuro/Psych:   (+) seizures:,             GI/Hepatic/Renal: Neg GI/Hepatic/Renal ROS            Endo/Other: Negative Endo/Other ROS                    Abdominal:             Vascular: negative vascular ROS. Other Findings:           Anesthesia Plan      MAC     ASA 2       Induction: intravenous. Anesthetic plan and risks discussed with patient.       Plan discussed with surgical team.                    CHAU Mixon - CRNA   1/10/2023

## 2023-01-10 NOTE — DISCHARGE INSTRUCTIONS
Lower Discharge Instructions    Patient Name: Ruthy Prasad  Patient ID: [id number]  YOB: 1955  Procedure: Chastity Cassette  Referring Physician: [unfilled]  Procedure Date: 1/10/2023    Recommendations:  []   Follow-up appointment with family physician in 4 weeks. [x]   Colonoscopy recommended in 5  years. []   Follow-up appointment with endoscopist in  Reports of your procedure and these recommendations have been sent to:  [unfilled]    Sedative medication given for procedures can slow your reaction time and coordination for many hours. If you receive medications, it is important for your safety to follow the instructions below for the remainder of the day:  BE TAKEN directly home from the center and rest quietly. DO NOT resume normal activities until tomorrow. Do NOT drive, return to work, or operate any machinery or power tools. Do NOT make any important personal or business decisions, sign any legal papers or perform any activity that depends on your full concentration power or mental judgement. Do NOT drink any alcohol or take nerve or sleeping drugs. They add to the effects of the medicine still present in your body.    [] (Checked if a biopsy or polyp removal was performed)  There is a slight risk of bleeding. If you had a biopsy or a polyp removed, we suggest that you follow the instructions below:  Do NOT take aspirin or similar anti-inflammatory medicine for ___ days. Do NOT exercise, jog, or do any heavy lifting or straining for 1 day. Potential common after effects and treatment following the procedure:  Mild abdominal pain, bloating, or excessive gas - rest, eat lightly, and use a heating pad. Redness and/or swelling where the IV was - apply heat and elevate. Symptoms to report to your physician:  Severe abdominal pain or bloating. Chills or fever above 101 degrees occurring within 24 hours after procedure. Large amounts of rectal bleeding that does not stop.  Small amount of rectal bleeding is not serious especially if hemorrhoids are present. Unable to keep down food or drink. IV site stays red and swollen for more than 2 days. In the case of an emergency, please go to the emergency room. If you are not having an emergency but are having some of the above symptoms please call the doctor's office at:  Dr. Deena Hillman [phone]   @[email or web address]@      I have read and understand the above instructions:            ____________________________         ____________________________  Patient or Patient Rep. Signature   Witness Signature      Date: 1/10/2023  Time:

## 2023-04-17 SDOH — HEALTH STABILITY: PHYSICAL HEALTH: ON AVERAGE, HOW MANY DAYS PER WEEK DO YOU ENGAGE IN MODERATE TO STRENUOUS EXERCISE (LIKE A BRISK WALK)?: 3 DAYS

## 2023-04-17 SDOH — HEALTH STABILITY: PHYSICAL HEALTH: ON AVERAGE, HOW MANY MINUTES DO YOU ENGAGE IN EXERCISE AT THIS LEVEL?: 20 MIN

## 2023-04-17 ASSESSMENT — PATIENT HEALTH QUESTIONNAIRE - PHQ9
SUM OF ALL RESPONSES TO PHQ QUESTIONS 1-9: 0
1. LITTLE INTEREST OR PLEASURE IN DOING THINGS: 0
SUM OF ALL RESPONSES TO PHQ QUESTIONS 1-9: 0
2. FEELING DOWN, DEPRESSED OR HOPELESS: 0
SUM OF ALL RESPONSES TO PHQ QUESTIONS 1-9: 0
SUM OF ALL RESPONSES TO PHQ9 QUESTIONS 1 & 2: 0
SUM OF ALL RESPONSES TO PHQ QUESTIONS 1-9: 0

## 2023-04-17 ASSESSMENT — LIFESTYLE VARIABLES
HOW OFTEN DO YOU HAVE A DRINK CONTAINING ALCOHOL: MONTHLY OR LESS
HOW OFTEN DO YOU HAVE SIX OR MORE DRINKS ON ONE OCCASION: 1
HOW OFTEN DO YOU HAVE A DRINK CONTAINING ALCOHOL: 2
HOW MANY STANDARD DRINKS CONTAINING ALCOHOL DO YOU HAVE ON A TYPICAL DAY: 1
HOW MANY STANDARD DRINKS CONTAINING ALCOHOL DO YOU HAVE ON A TYPICAL DAY: 1 OR 2

## 2023-04-24 SDOH — ECONOMIC STABILITY: FOOD INSECURITY: WITHIN THE PAST 12 MONTHS, THE FOOD YOU BOUGHT JUST DIDN'T LAST AND YOU DIDN'T HAVE MONEY TO GET MORE.: NEVER TRUE

## 2023-04-24 SDOH — ECONOMIC STABILITY: INCOME INSECURITY: HOW HARD IS IT FOR YOU TO PAY FOR THE VERY BASICS LIKE FOOD, HOUSING, MEDICAL CARE, AND HEATING?: NOT HARD AT ALL

## 2023-04-24 SDOH — ECONOMIC STABILITY: HOUSING INSECURITY
IN THE LAST 12 MONTHS, WAS THERE A TIME WHEN YOU DID NOT HAVE A STEADY PLACE TO SLEEP OR SLEPT IN A SHELTER (INCLUDING NOW)?: NO

## 2023-04-24 SDOH — ECONOMIC STABILITY: FOOD INSECURITY: WITHIN THE PAST 12 MONTHS, YOU WORRIED THAT YOUR FOOD WOULD RUN OUT BEFORE YOU GOT MONEY TO BUY MORE.: NEVER TRUE

## 2023-04-26 ENCOUNTER — OFFICE VISIT (OUTPATIENT)
Dept: FAMILY MEDICINE CLINIC | Age: 68
End: 2023-04-26
Payer: MEDICARE

## 2023-04-26 VITALS
SYSTOLIC BLOOD PRESSURE: 130 MMHG | OXYGEN SATURATION: 100 % | HEART RATE: 72 BPM | HEIGHT: 65 IN | BODY MASS INDEX: 21.99 KG/M2 | WEIGHT: 132 LBS | DIASTOLIC BLOOD PRESSURE: 80 MMHG

## 2023-04-26 DIAGNOSIS — Z00.00 INITIAL MEDICARE ANNUAL WELLNESS VISIT: Primary | ICD-10-CM

## 2023-04-26 DIAGNOSIS — Z13.220 LIPID SCREENING: ICD-10-CM

## 2023-04-26 DIAGNOSIS — R51.9 CHRONIC NONINTRACTABLE HEADACHE, UNSPECIFIED HEADACHE TYPE: ICD-10-CM

## 2023-04-26 DIAGNOSIS — G89.29 CHRONIC NONINTRACTABLE HEADACHE, UNSPECIFIED HEADACHE TYPE: ICD-10-CM

## 2023-04-26 DIAGNOSIS — D64.9 ANEMIA, UNSPECIFIED TYPE: ICD-10-CM

## 2023-04-26 LAB
ALBUMIN SERPL-MCNC: 4.7 G/DL (ref 3.5–4.6)
ALP SERPL-CCNC: 76 U/L (ref 40–130)
ALT SERPL-CCNC: 10 U/L (ref 0–33)
ANION GAP SERPL CALCULATED.3IONS-SCNC: 12 MEQ/L (ref 9–15)
AST SERPL-CCNC: 12 U/L (ref 0–35)
BASOPHILS # BLD: 0 K/UL (ref 0–0.2)
BASOPHILS NFR BLD: 0.9 %
BILIRUB SERPL-MCNC: 0.3 MG/DL (ref 0.2–0.7)
BUN SERPL-MCNC: 29 MG/DL (ref 8–23)
CALCIUM SERPL-MCNC: 9.8 MG/DL (ref 8.5–9.9)
CHLORIDE SERPL-SCNC: 104 MEQ/L (ref 95–107)
CHOLEST SERPL-MCNC: 265 MG/DL (ref 0–199)
CO2 SERPL-SCNC: 27 MEQ/L (ref 20–31)
CREAT SERPL-MCNC: 0.68 MG/DL (ref 0.5–0.9)
EOSINOPHIL # BLD: 0.2 K/UL (ref 0–0.7)
EOSINOPHIL NFR BLD: 3.6 %
ERYTHROCYTE [DISTWIDTH] IN BLOOD BY AUTOMATED COUNT: 13.2 % (ref 11.5–14.5)
GLOBULIN SER CALC-MCNC: 2.6 G/DL (ref 2.3–3.5)
GLUCOSE SERPL-MCNC: 88 MG/DL (ref 70–99)
HCT VFR BLD AUTO: 39.7 % (ref 37–47)
HDLC SERPL-MCNC: 61 MG/DL (ref 40–59)
HGB BLD-MCNC: 13.3 G/DL (ref 12–16)
LDLC SERPL CALC-MCNC: 170 MG/DL (ref 0–129)
LYMPHOCYTES # BLD: 1.1 K/UL (ref 1–4.8)
LYMPHOCYTES NFR BLD: 23.7 %
MCH RBC QN AUTO: 31.6 PG (ref 27–31.3)
MCHC RBC AUTO-ENTMCNC: 33.5 % (ref 33–37)
MCV RBC AUTO: 94.3 FL (ref 79.4–94.8)
MONOCYTES # BLD: 0.4 K/UL (ref 0.2–0.8)
MONOCYTES NFR BLD: 7.5 %
NEUTROPHILS # BLD: 3.1 K/UL (ref 1.4–6.5)
NEUTS SEG NFR BLD: 64.3 %
PLATELET # BLD AUTO: 190 K/UL (ref 130–400)
POTASSIUM SERPL-SCNC: 5 MEQ/L (ref 3.4–4.9)
PROT SERPL-MCNC: 7.3 G/DL (ref 6.3–8)
RBC # BLD AUTO: 4.21 M/UL (ref 4.2–5.4)
SODIUM SERPL-SCNC: 143 MEQ/L (ref 135–144)
TRIGL SERPL-MCNC: 169 MG/DL (ref 0–150)
WBC # BLD AUTO: 4.8 K/UL (ref 4.8–10.8)

## 2023-04-26 PROCEDURE — 3017F COLORECTAL CA SCREEN DOC REV: CPT | Performed by: NURSE PRACTITIONER

## 2023-04-26 PROCEDURE — G0438 PPPS, INITIAL VISIT: HCPCS | Performed by: NURSE PRACTITIONER

## 2023-04-26 PROCEDURE — 1123F ACP DISCUSS/DSCN MKR DOCD: CPT | Performed by: NURSE PRACTITIONER

## 2023-04-26 NOTE — PROGRESS NOTES
Medicare Annual Wellness Visit    Damion Howard is here for Medicare AWV    Assessment & Plan   Initial Medicare annual wellness visit  Anemia, unspecified type  -     CBC with Auto Differential; Future  -     Vitamin B12 & Folate; Future  Chronic nonintractable headache, unspecified headache type  -     Comprehensive Metabolic Panel; Future  Lipid screening  -     Lipid Panel; Future    Recommendations for Preventive Services Due: see orders and patient instructions/AVS.  Recommended screening schedule for the next 5-10 years is provided to the patient in written form: see Patient Instructions/AVS.     No follow-ups on file. Subjective   The following acute and/or chronic problems were also addressed today:  Having some issues with     Migraines- has used fioricet and celebrex, mobic, flexeril for headaches associated with tension HA's    Patient's complete Health Risk Assessment and screening values have been reviewed and are found in Flowsheets. The following problems were reviewed today and where indicated follow up appointments were made and/or referrals ordered. No Positive Risk Factors identified today. Pt Declined pneumonia vaccination       Objective   Vitals:    04/26/23 1035   BP: 130/80   Site: Left Upper Arm   Position: Sitting   Cuff Size: Medium Adult   Pulse: 72   SpO2: 100%   Weight: 132 lb (59.9 kg)   Height: 5' 5\" (1.651 m)      Body mass index is 21.97 kg/m².       General Appearance: alert and oriented to person, place and time, well developed and well- nourished, in no acute distress  Skin: warm and dry, no rash or erythema  Head: normocephalic and atraumatic  Eyes: pupils equal, round, and reactive to light, extraocular eye movements intact, conjunctivae normal  ENT: tympanic membrane, external ear and ear canal normal bilaterally, nose without deformity, nasal mucosa and turbinates normal without polyps  Neck: supple and non-tender without mass, no

## 2023-04-27 LAB
FOLATE: 14.1 NG/ML
VITAMIN B-12: 631 PG/ML (ref 232–1245)

## 2023-07-25 ENCOUNTER — OFFICE VISIT (OUTPATIENT)
Dept: FAMILY MEDICINE CLINIC | Age: 68
End: 2023-07-25
Payer: MEDICARE

## 2023-07-25 ENCOUNTER — HOSPITAL ENCOUNTER (OUTPATIENT)
Dept: ULTRASOUND IMAGING | Age: 68
Discharge: HOME | End: 2023-07-27
Payer: MEDICARE

## 2023-07-25 VITALS
SYSTOLIC BLOOD PRESSURE: 118 MMHG | TEMPERATURE: 98.6 F | HEART RATE: 85 BPM | BODY MASS INDEX: 22.16 KG/M2 | DIASTOLIC BLOOD PRESSURE: 72 MMHG | HEIGHT: 65 IN | WEIGHT: 133 LBS | OXYGEN SATURATION: 96 %

## 2023-07-25 DIAGNOSIS — R82.998 DARK URINE: ICD-10-CM

## 2023-07-25 DIAGNOSIS — R17 JAUNDICE: ICD-10-CM

## 2023-07-25 DIAGNOSIS — R10.13 EPIGASTRIC PAIN: ICD-10-CM

## 2023-07-25 DIAGNOSIS — R53.83 OTHER FATIGUE: ICD-10-CM

## 2023-07-25 DIAGNOSIS — R17 JAUNDICE: Primary | ICD-10-CM

## 2023-07-25 LAB
ALBUMIN SERPL-MCNC: 3.8 G/DL (ref 3.5–4.6)
ALP SERPL-CCNC: 317 U/L (ref 40–130)
ALT SERPL-CCNC: 1873 U/L (ref 0–33)
ANION GAP SERPL CALCULATED.3IONS-SCNC: 10 MEQ/L (ref 9–15)
AST SERPL-CCNC: 1388 U/L (ref 0–35)
BASOPHILS # BLD: 0 K/UL (ref 0–0.2)
BASOPHILS NFR BLD: 0.6 %
BILIRUB SERPL-MCNC: 8.9 MG/DL (ref 0.2–0.7)
BILIRUBIN, POC: NORMAL
BLOOD URINE, POC: NORMAL
BUN SERPL-MCNC: 16 MG/DL (ref 8–23)
CALCIUM SERPL-MCNC: 9.6 MG/DL (ref 8.5–9.9)
CHLORIDE SERPL-SCNC: 103 MEQ/L (ref 95–107)
CLARITY, POC: CLEAR
CO2 SERPL-SCNC: 25 MEQ/L (ref 20–31)
COLOR, POC: YELLOW
CREAT SERPL-MCNC: 0.55 MG/DL (ref 0.5–0.9)
EOSINOPHIL # BLD: 0.1 K/UL (ref 0–0.7)
EOSINOPHIL NFR BLD: 1.8 %
ERYTHROCYTE [DISTWIDTH] IN BLOOD BY AUTOMATED COUNT: 16.3 % (ref 11.5–14.5)
GLOBULIN SER CALC-MCNC: 3.5 G/DL (ref 2.3–3.5)
GLUCOSE SERPL-MCNC: 91 MG/DL (ref 70–99)
GLUCOSE URINE, POC: NORMAL
HCT VFR BLD AUTO: 38.5 % (ref 37–47)
HGB BLD-MCNC: 12.7 G/DL (ref 12–16)
KETONES, POC: NORMAL
LEUKOCYTE EST, POC: NORMAL
LIPASE SERPL-CCNC: 87 U/L (ref 12–95)
LYMPHOCYTES # BLD: 0.8 K/UL (ref 1–4.8)
LYMPHOCYTES NFR BLD: 14.6 %
MCH RBC QN AUTO: 31.5 PG (ref 27–31.3)
MCHC RBC AUTO-ENTMCNC: 33 % (ref 33–37)
MCV RBC AUTO: 95.5 FL (ref 79.4–94.8)
MONOCYTES # BLD: 0.8 K/UL (ref 0.2–0.8)
MONOCYTES NFR BLD: 13.9 %
NEUTROPHILS # BLD: 3.8 K/UL (ref 1.4–6.5)
NEUTS SEG NFR BLD: 69.1 %
NITRITE, POC: NORMAL
PH, POC: 6
PLATELET # BLD AUTO: 177 K/UL (ref 130–400)
POTASSIUM SERPL-SCNC: 4.7 MEQ/L (ref 3.4–4.9)
PROT SERPL-MCNC: 7.3 G/DL (ref 6.3–8)
PROTEIN, POC: NORMAL
RBC # BLD AUTO: 4.03 M/UL (ref 4.2–5.4)
SODIUM SERPL-SCNC: 138 MEQ/L (ref 135–144)
SPECIFIC GRAVITY, POC: 1.01
UROBILINOGEN, POC: 0.2
WBC # BLD AUTO: 5.6 K/UL (ref 4.8–10.8)

## 2023-07-25 PROCEDURE — G8399 PT W/DXA RESULTS DOCUMENT: HCPCS | Performed by: NURSE PRACTITIONER

## 2023-07-25 PROCEDURE — 1123F ACP DISCUSS/DSCN MKR DOCD: CPT | Performed by: NURSE PRACTITIONER

## 2023-07-25 PROCEDURE — 99214 OFFICE O/P EST MOD 30 MIN: CPT | Performed by: NURSE PRACTITIONER

## 2023-07-25 PROCEDURE — G8427 DOCREV CUR MEDS BY ELIG CLIN: HCPCS | Performed by: NURSE PRACTITIONER

## 2023-07-25 PROCEDURE — 81003 URINALYSIS AUTO W/O SCOPE: CPT | Performed by: NURSE PRACTITIONER

## 2023-07-25 PROCEDURE — 1036F TOBACCO NON-USER: CPT | Performed by: NURSE PRACTITIONER

## 2023-07-25 PROCEDURE — 76705 ECHO EXAM OF ABDOMEN: CPT

## 2023-07-25 PROCEDURE — G8420 CALC BMI NORM PARAMETERS: HCPCS | Performed by: NURSE PRACTITIONER

## 2023-07-25 PROCEDURE — 1090F PRES/ABSN URINE INCON ASSESS: CPT | Performed by: NURSE PRACTITIONER

## 2023-07-25 PROCEDURE — 3017F COLORECTAL CA SCREEN DOC REV: CPT | Performed by: NURSE PRACTITIONER

## 2023-07-25 ASSESSMENT — ENCOUNTER SYMPTOMS
BLOOD IN STOOL: 0
ABDOMINAL DISTENTION: 1
DIARRHEA: 0
COUGH: 0
WHEEZING: 0
RHINORRHEA: 0
SHORTNESS OF BREATH: 0
NAUSEA: 0
SORE THROAT: 0
VOMITING: 0
ABDOMINAL PAIN: 1

## 2023-07-25 NOTE — PROGRESS NOTES
Subjective:      Patient ID: Johann Carmona is a 78 y.o. female who presents today for:  Chief Complaint   Patient presents with    Urinary Tract Infection     X 2 weeks no SX has rib tenderness  but getting better         HPI    Urine is dark   Like almost orange   Then epigastric pain and laurent rib pain   Like she got punched in the epigastric area   Drinking lots of water  Sometimes urine looks a little better   Then dark again   Over the weekend took some OTC UTI stuff  2 weeks Sarabjit Taylor is going on   Not gone  Tired and cant stay awake   Cant eat as much because shes drinking so much water she feels full   More and more tired and cannot stay awake   Tans stools     Colonoscopy in [place] good   She denies jaundice   Feels bloated           Past Medical History:   Diagnosis Date    Osteoarthritis     neck    Seizure ([address]) 12/29/2021     Past Surgical History:   Procedure Laterality Date    COLONOSCOPY N/A 1/10/2023    COLONOSCOPY DIAGNOSTIC performed by Jeannette Alexandre MD at [address] History     Socioeconomic History    Marital status:      Spouse name: Not on file    Number of children: Not on file    Years of education: Not on file    Highest education level: Not on file   Occupational History    Not on file   Tobacco Use    Smoking status: Never    Smokeless tobacco: Never   Substance and Sexual Activity    Alcohol use: No     Comment: occasionally    Drug use: No    Sexual activity: Not on file   Other Topics Concern    Not on file   Social History Narrative    Not on file     Social Determinants of Health     Financial Resource Strain: Low Risk     Difficulty of Paying Living Expenses: Not hard at all   Food Insecurity: No Food Insecurity    Worried About Running Out of Food in the Last Year: Never true    [address] in the Last Year: Never true   Transportation Needs: Unknown    Lack of Transportation (Medical):  Not on file    Lack of

## 2023-07-26 LAB
HAV IGM SER IA-ACNC: NONREACTIVE
HEPATITIS B CORE IGM ANTIBODY: NONREACTIVE
HEPATITIS B SURF AG,XHBAGS: NONREACTIVE
HEPATITIS C ANTIBODY: NONREACTIVE

## 2023-07-28 ENCOUNTER — OUTSIDE SERVICES (OUTPATIENT)
Dept: INFECTIOUS DISEASES | Age: 68
End: 2023-07-28
Payer: MEDICARE

## 2023-07-28 DIAGNOSIS — B17.9 ACUTE VIRAL HEPATITIS, UNSPECIFIED VIRAL HEPATITIS TYPE: ICD-10-CM

## 2023-07-28 DIAGNOSIS — R17 SERUM TOTAL BILIRUBIN ELEVATED: ICD-10-CM

## 2023-07-28 DIAGNOSIS — R79.89 ABNORMAL LFTS: Primary | ICD-10-CM

## 2023-07-28 PROCEDURE — 99223 1ST HOSP IP/OBS HIGH 75: CPT | Performed by: INTERNAL MEDICINE

## 2023-07-29 ENCOUNTER — OUTSIDE SERVICES (OUTPATIENT)
Dept: INFECTIOUS DISEASES | Age: 68
End: 2023-07-29
Payer: MEDICARE

## 2023-07-29 DIAGNOSIS — B17.9 ACUTE VIRAL HEPATITIS, UNSPECIFIED VIRAL HEPATITIS TYPE: ICD-10-CM

## 2023-07-29 DIAGNOSIS — R17 SERUM TOTAL BILIRUBIN ELEVATED: ICD-10-CM

## 2023-07-29 DIAGNOSIS — R79.89 ABNORMAL LFTS: Primary | ICD-10-CM

## 2023-07-29 LAB
HIV AG/AB: NONREACTIVE
SPECIMEN SOURCE: NORMAL

## 2023-07-29 PROCEDURE — 99232 SBSQ HOSP IP/OBS MODERATE 35: CPT | Performed by: INTERNAL MEDICINE

## 2023-07-30 LAB — PROCALCITONIN: 0.37 NG/ML

## 2023-08-07 ENCOUNTER — OFFICE VISIT (OUTPATIENT)
Dept: FAMILY MEDICINE CLINIC | Age: 68
End: 2023-08-07
Payer: MEDICARE

## 2023-08-07 VITALS
BODY MASS INDEX: 22.33 KG/M2 | WEIGHT: 134 LBS | HEIGHT: 65 IN | SYSTOLIC BLOOD PRESSURE: 116 MMHG | TEMPERATURE: 97.5 F | DIASTOLIC BLOOD PRESSURE: 60 MMHG

## 2023-08-07 DIAGNOSIS — B17.9 ACUTE HEPATITIS: ICD-10-CM

## 2023-08-07 DIAGNOSIS — E80.6 HYPERBILIRUBINEMIA: ICD-10-CM

## 2023-08-07 DIAGNOSIS — R17 JAUNDICE: Primary | ICD-10-CM

## 2023-08-07 LAB
ALBUMIN SERPL-MCNC: 3.4 G/DL (ref 3.5–4.6)
ALP SERPL-CCNC: 236 U/L (ref 40–130)
ALT SERPL-CCNC: 1191 U/L (ref 0–33)
ANION GAP SERPL CALCULATED.3IONS-SCNC: 14 MEQ/L (ref 9–15)
ANISOCYTOSIS BLD QL SMEAR: ABNORMAL
AST SERPL-CCNC: 1337 U/L (ref 0–35)
BASOPHILS # BLD: 0.1 K/UL (ref 0–0.2)
BASOPHILS NFR BLD: 1 %
BILIRUB SERPL-MCNC: 24.9 MG/DL (ref 0.2–0.7)
BUN SERPL-MCNC: 17 MG/DL (ref 8–23)
CALCIUM SERPL-MCNC: 9.3 MG/DL (ref 8.5–9.9)
CHLORIDE SERPL-SCNC: 100 MEQ/L (ref 95–107)
CO2 SERPL-SCNC: 22 MEQ/L (ref 20–31)
CREAT SERPL-MCNC: 0.32 MG/DL (ref 0.5–0.9)
EOSINOPHIL # BLD: 0.2 K/UL (ref 0–0.7)
EOSINOPHIL NFR BLD: 3 %
ERYTHROCYTE [DISTWIDTH] IN BLOOD BY AUTOMATED COUNT: 21.4 % (ref 11.5–14.5)
GLOBULIN SER CALC-MCNC: 4.2 G/DL (ref 2.3–3.5)
GLUCOSE SERPL-MCNC: 83 MG/DL (ref 70–99)
HCT VFR BLD AUTO: 41.1 % (ref 37–47)
HGB BLD-MCNC: 13.6 G/DL (ref 12–16)
LDH SERPL-CCNC: 415 U/L (ref 135–214)
LYMPHOCYTES # BLD: 1.7 K/UL (ref 1–4.8)
LYMPHOCYTES NFR BLD: 23 %
MCH RBC QN AUTO: 30.4 PG (ref 27–31.3)
MCHC RBC AUTO-ENTMCNC: 33.2 % (ref 33–37)
MCV RBC AUTO: 91.6 FL (ref 79.4–94.8)
MONOCYTES # BLD: 0.5 K/UL (ref 0.2–0.8)
MONOCYTES NFR BLD: 7 %
NEUTROPHILS # BLD: 4.9 K/UL (ref 1.4–6.5)
NEUTS SEG NFR BLD: 66 %
PLATELET # BLD AUTO: 235 K/UL (ref 130–400)
PLATELET BLD QL SMEAR: ADEQUATE
POTASSIUM SERPL-SCNC: 4.1 MEQ/L (ref 3.4–4.9)
PROT SERPL-MCNC: 7.6 G/DL (ref 6.3–8)
RBC # BLD AUTO: 4.49 M/UL (ref 4.2–5.4)
SODIUM SERPL-SCNC: 136 MEQ/L (ref 135–144)
WBC # BLD AUTO: 7.4 K/UL (ref 4.8–10.8)

## 2023-08-07 PROCEDURE — G8420 CALC BMI NORM PARAMETERS: HCPCS | Performed by: NURSE PRACTITIONER

## 2023-08-07 PROCEDURE — G8399 PT W/DXA RESULTS DOCUMENT: HCPCS | Performed by: NURSE PRACTITIONER

## 2023-08-07 PROCEDURE — G8427 DOCREV CUR MEDS BY ELIG CLIN: HCPCS | Performed by: NURSE PRACTITIONER

## 2023-08-07 PROCEDURE — 1090F PRES/ABSN URINE INCON ASSESS: CPT | Performed by: NURSE PRACTITIONER

## 2023-08-07 PROCEDURE — 99213 OFFICE O/P EST LOW 20 MIN: CPT | Performed by: NURSE PRACTITIONER

## 2023-08-07 PROCEDURE — 1036F TOBACCO NON-USER: CPT | Performed by: NURSE PRACTITIONER

## 2023-08-07 PROCEDURE — 1123F ACP DISCUSS/DSCN MKR DOCD: CPT | Performed by: NURSE PRACTITIONER

## 2023-08-07 PROCEDURE — 3017F COLORECTAL CA SCREEN DOC REV: CPT | Performed by: NURSE PRACTITIONER

## 2023-08-07 ASSESSMENT — ENCOUNTER SYMPTOMS
ABDOMINAL PAIN: 0
COLOR CHANGE: 1
ABDOMINAL DISTENTION: 1
NAUSEA: 1
SHORTNESS OF BREATH: 0
VOMITING: 0
SORE THROAT: 0
RHINORRHEA: 0
COUGH: 0
DIARRHEA: 0
WHEEZING: 0

## 2023-08-08 ENCOUNTER — TELEPHONE (OUTPATIENT)
Dept: GASTROENTEROLOGY | Age: 68
End: 2023-08-08

## 2023-08-08 NOTE — TELEPHONE ENCOUNTER
Called patient discussed most recent blood work results, clinically pt feels fatigued and reports increased abdominal swelling, no confusion or overt bleeding. Advised pt to go to [facility] or [facility] emergency room for evaluation and admission for liver biopsy, will likely need admission to tertiary center/liver transplant center for consideration of transjugular biopsy given the worsening liver failure and coagulopathy. Pt agreeable to go the ED at [facility] for evaluation.

## 2023-08-31 ENCOUNTER — TELEPHONE (OUTPATIENT)
Dept: FAMILY MEDICINE CLINIC | Age: 68
End: 2023-08-31

## 2023-08-31 DIAGNOSIS — Z12.31 OTHER SCREENING MAMMOGRAM: Primary | ICD-10-CM

## 2023-08-31 NOTE — TELEPHONE ENCOUNTER
----- Message from Frankie Malik sent at 8/31/2023  9:00 AM EDT -----  Subject: Referral Request    Reason for referral request? Yearly Mammogram  Provider patient wants to be referred to(if known): Krista Rojas    Provider Phone Number(if known):     Additional Information for Provider? patient states she received a letter   from the [facility] that she is due for her mammogram and would need   an order from her pcp to get this done.  ---------------------------------------------------------------------------  --------------  [address]    [phone]; OK to leave message on voicemail  ---------------------------------------------------------------------------  --------------

## 2023-09-15 ENCOUNTER — HOSPITAL ENCOUNTER (OUTPATIENT)
Dept: WOMENS IMAGING | Age: 68
End: 2023-09-15
Payer: MEDICARE

## 2023-09-15 VITALS — BODY MASS INDEX: 20.47 KG/M2 | WEIGHT: 123 LBS

## 2023-09-15 DIAGNOSIS — Z12.31 OTHER SCREENING MAMMOGRAM: ICD-10-CM

## 2023-09-15 PROCEDURE — 77063 BREAST TOMOSYNTHESIS BI: CPT

## 2024-04-26 SDOH — ECONOMIC STABILITY: FOOD INSECURITY: WITHIN THE PAST 12 MONTHS, THE FOOD YOU BOUGHT JUST DIDN'T LAST AND YOU DIDN'T HAVE MONEY TO GET MORE.: NEVER TRUE

## 2024-04-26 SDOH — ECONOMIC STABILITY: FOOD INSECURITY: WITHIN THE PAST 12 MONTHS, YOU WORRIED THAT YOUR FOOD WOULD RUN OUT BEFORE YOU GOT MONEY TO BUY MORE.: NEVER TRUE

## 2024-04-26 SDOH — ECONOMIC STABILITY: INCOME INSECURITY: HOW HARD IS IT FOR YOU TO PAY FOR THE VERY BASICS LIKE FOOD, HOUSING, MEDICAL CARE, AND HEATING?: NOT HARD AT ALL

## 2024-04-26 ASSESSMENT — PATIENT HEALTH QUESTIONNAIRE - PHQ9
1. LITTLE INTEREST OR PLEASURE IN DOING THINGS: NOT AT ALL
SUM OF ALL RESPONSES TO PHQ QUESTIONS 1-9: 0
SUM OF ALL RESPONSES TO PHQ9 QUESTIONS 1 & 2: 0
SUM OF ALL RESPONSES TO PHQ QUESTIONS 1-9: 0
2. FEELING DOWN, DEPRESSED OR HOPELESS: NOT AT ALL
1. LITTLE INTEREST OR PLEASURE IN DOING THINGS: NOT AT ALL
2. FEELING DOWN, DEPRESSED OR HOPELESS: NOT AT ALL
SUM OF ALL RESPONSES TO PHQ QUESTIONS 1-9: 0
SUM OF ALL RESPONSES TO PHQ9 QUESTIONS 1 & 2: 0
SUM OF ALL RESPONSES TO PHQ QUESTIONS 1-9: 0

## 2024-04-29 ENCOUNTER — OFFICE VISIT (OUTPATIENT)
Dept: FAMILY MEDICINE CLINIC | Age: 69
End: 2024-04-29
Payer: MEDICARE

## 2024-04-29 VITALS
DIASTOLIC BLOOD PRESSURE: 86 MMHG | SYSTOLIC BLOOD PRESSURE: 162 MMHG | HEART RATE: 70 BPM | WEIGHT: 129.2 LBS | HEIGHT: 65 IN | OXYGEN SATURATION: 92 % | TEMPERATURE: 98.9 F | BODY MASS INDEX: 21.52 KG/M2

## 2024-04-29 DIAGNOSIS — Z78.0 POSTMENOPAUSAL: ICD-10-CM

## 2024-04-29 DIAGNOSIS — R51.9 CHRONIC NONINTRACTABLE HEADACHE, UNSPECIFIED HEADACHE TYPE: ICD-10-CM

## 2024-04-29 DIAGNOSIS — G89.29 CHRONIC NONINTRACTABLE HEADACHE, UNSPECIFIED HEADACHE TYPE: ICD-10-CM

## 2024-04-29 DIAGNOSIS — Z00.00 MEDICARE ANNUAL WELLNESS VISIT, SUBSEQUENT: Primary | ICD-10-CM

## 2024-04-29 PROCEDURE — 1123F ACP DISCUSS/DSCN MKR DOCD: CPT | Performed by: NURSE PRACTITIONER

## 2024-04-29 PROCEDURE — 3017F COLORECTAL CA SCREEN DOC REV: CPT | Performed by: NURSE PRACTITIONER

## 2024-04-29 PROCEDURE — G0439 PPPS, SUBSEQ VISIT: HCPCS | Performed by: NURSE PRACTITIONER

## 2024-04-29 RX ORDER — MYCOPHENOLATE MOFETIL 250 MG/1
750 CAPSULE ORAL 2 TIMES DAILY
COMMUNITY
Start: 2024-04-02 | End: 2024-04-29

## 2024-04-29 RX ORDER — PREDNISONE 5 MG/1
15 TABLET ORAL DAILY
COMMUNITY
Start: 2024-04-03

## 2024-04-29 RX ORDER — PREDNISONE 10 MG/1
20 TABLET ORAL DAILY
COMMUNITY
Start: 2024-02-29

## 2024-04-29 RX ORDER — CELECOXIB 200 MG/1
200 CAPSULE ORAL DAILY
Qty: 30 CAPSULE | Refills: 5 | Status: SHIPPED | OUTPATIENT
Start: 2024-04-29

## 2024-04-29 ASSESSMENT — PATIENT HEALTH QUESTIONNAIRE - PHQ9
SUM OF ALL RESPONSES TO PHQ QUESTIONS 1-9: 0
1. LITTLE INTEREST OR PLEASURE IN DOING THINGS: NOT AT ALL
SUM OF ALL RESPONSES TO PHQ QUESTIONS 1-9: 0
2. FEELING DOWN, DEPRESSED OR HOPELESS: NOT AT ALL
SUM OF ALL RESPONSES TO PHQ9 QUESTIONS 1 & 2: 0

## 2024-04-29 ASSESSMENT — LIFESTYLE VARIABLES
HOW OFTEN DO YOU HAVE A DRINK CONTAINING ALCOHOL: MONTHLY OR LESS
HOW MANY STANDARD DRINKS CONTAINING ALCOHOL DO YOU HAVE ON A TYPICAL DAY: 1 OR 2

## 2024-04-29 NOTE — PROGRESS NOTES
Height: 1.651 m (5' 5\")       Body mass index is 21.5 kg/m².      General Appearance: alert and oriented to person, place and time, well developed and well- nourished, in no acute distress  Skin: warm and dry, no rash or erythema  Head: normocephalic and atraumatic  Eyes: pupils equal, round, and reactive to light, extraocular eye movements intact, conjunctivae normal  Neck: supple and non-tender without mass, no thyromegaly or thyroid nodules, no cervical lymphadenopathy  Pulmonary/Chest: clear to auscultation bilaterally- no wheezes, rales or rhonchi, normal air movement, no respiratory distress  Cardiovascular: normal rate, regular rhythm, normal S1 and S2, no murmurs, rubs, clicks, or gallops, distal pulses intact, no carotid bruits  Extremities: no cyanosis, clubbing or edema  Musculoskeletal: normal range of motion, no joint swelling, deformity or tenderness  Neurologic: reflexes normal and symmetric, no cranial nerve deficit, gait, coordination and speech normal       Allergies   Allergen Reactions    Pcn [Penicillins] Swelling and Rash     Prior to Visit Medications    Medication Sig Taking? Authorizing Provider   predniSONE (DELTASONE) 5 MG tablet Take 3 tablets by mouth daily Yes ProviderCaroline MD   predniSONE (DELTASONE) 10 MG tablet Take 2 tablets by mouth daily Yes ProviderCaroline MD   celecoxib (CELEBREX) 200 MG capsule Take 1 capsule by mouth daily Yes Ling Kirkland APRN - CNP   ibuprofen (ADVIL;MOTRIN) 200 MG tablet Take 1 tablet by mouth every 6 hours as needed for Pain Yes ProviderCaroline MD   levocetirizine (XYZAL) 5 MG tablet Take 1 tablet by mouth daily Yes Ling Kirkland APRN - CNP       CareTeam (Including outside providers/suppliers regularly involved in providing care):   Patient Care Team:  Ling Kirkland APRN - CNP as PCP - General (Nurse Practitioner)  Ling Kirkland APRN - CNP as PCP - Empaneled Provider  Maureen Dasilva PA-C (Physician Assistant)

## 2024-04-30 ENCOUNTER — OFFICE VISIT (OUTPATIENT)
Dept: FAMILY MEDICINE CLINIC | Age: 69
End: 2024-04-30

## 2024-04-30 VITALS — TEMPERATURE: 97.5 F | WEIGHT: 129 LBS | BODY MASS INDEX: 21.49 KG/M2 | HEIGHT: 65 IN

## 2024-04-30 DIAGNOSIS — D49.2 ABNORMAL SKIN GROWTH: ICD-10-CM

## 2024-04-30 DIAGNOSIS — L98.9 CHANGING SKIN LESION: ICD-10-CM

## 2024-04-30 DIAGNOSIS — L81.4 SOLAR LENTIGO: ICD-10-CM

## 2024-04-30 DIAGNOSIS — Z12.83 SKIN CANCER SCREENING: Primary | ICD-10-CM

## 2024-04-30 DIAGNOSIS — L84 CALLUS: ICD-10-CM

## 2024-04-30 DIAGNOSIS — D18.01 CHERRY ANGIOMA: ICD-10-CM

## 2024-04-30 ASSESSMENT — ENCOUNTER SYMPTOMS: COLOR CHANGE: 1

## 2024-04-30 NOTE — PROGRESS NOTES
Diagnosis Orders   1. Skin cancer screening        2. Solar lentigo        3. Changing skin lesion  Specimen to Pathology Outpatient    PA SHAVING SKIN LESION 1 TRUNK/ARM/LEG DIAM 0.5CM/<      4. Abnormal skin growth  Specimen to Pathology Outpatient    PA SHAVING SKIN LESION 1 TRUNK/ARM/LEG DIAM 0.5CM/<      5. Callus  PA DESTRUCTION BENIGN LESIONS UP TO 14      6. Cherry angioma              Orders Placed This Encounter   Procedures    Specimen to Pathology Outpatient     Margins requested on all specimens  R/O melanoma  Location right posterior shoulder superiorly     Standing Status:   Future     Standing Expiration Date:   4/30/2025     Order Specific Question:   PREVIOUS BIOPSY     Answer:   No     Order Specific Question:   PREOP DIAGNOSIS     Answer:   Abnormal pigmented skin growth     Order Specific Question:   FROZEN SECTION - NO OR YES/SPECIMEN     Answer:   No    PA SHAVING SKIN LESION 1 TRUNK/ARM/LEG DIAM 0.5CM/<     Right posterior shoulder superiorly    PA DESTRUCTION BENIGN LESIONS UP TO 14       Emili was seen today for skin exam.    Diagnoses and all orders for this visit:    Skin cancer screening    Solar lentigo    Changing skin lesion  -     Specimen to Pathology Outpatient; Future  -     PA SHAVING SKIN LESION 1 TRUNK/ARM/LEG DIAM 0.5CM/<    Abnormal skin growth  -     Specimen to Pathology Outpatient; Future  -     PA SHAVING SKIN LESION 1 TRUNK/ARM/LEG DIAM 0.5CM/<    Callus  -     PA DESTRUCTION BENIGN LESIONS UP TO 14    Cherry angioma        Return in about 1 year (around 4/30/2025) for 15 min skin check.    Patient Instructions     Patient has been educated on nature of the benign lesions that have been noted and how to monitor for changes.    Patient has been educated on methods of skin cancer prevention such as using SPF 50 sunscreen products, especially those containing zinc.    Patient has been educated on changes and lesions that would need earlier follow-up than routine 1

## 2024-04-30 NOTE — PATIENT INSTRUCTIONS
Patient has been educated on nature of the benign lesions that have been noted and how to monitor for changes.    Patient has been educated on methods of skin cancer prevention such as using SPF 50 sunscreen products, especially those containing zinc.    Patient has been educated on changes and lesions that would need earlier follow-up than routine 1 year skin check as well as the ABCD's of watching for signs of melanoma    Lesions elected to be removed by LN and shave biopsy as appropriate.    Incision/ Shave biopsy/ Laceration repair    -Clean surgical area with antibacterial soap and water once daily.    -Keep surgical site moist with vaseline or antibiotic ointment (single- Bacitracin, not triple-Neosporin) and apply a fresh bandage daily until a solid scab forms or if the wound is at risk for trauma or dirt.   -Follow up immediately if any growing redness (minimal redness or pale pink is normal along wound edges) surrounds the surgical site or if dripping drainage occurs at surgical site. Once a solid scab forms no more bandage needed. A wet scab can look yellow.  This is not infection, just moisture.  -You may be instructed to soak the wound with Hydrogen Peroxide to loosen scabbing around sutures, this is not to be done more often that every 3 days, should be for 30 seconds-1 min and then rinsed off with water.   -Once the lesion is healed be sure to apply sunscreen to the area to prevent burn of the newer and more delicate skin during the first 6 months of healing.    -If the scar begins to be raised you may massage the area firmly twice a day to help break down scar tissue and help the area become a flat scar. There is some evidence that Mederma applied to a scar daily for the first few months can help shrink and fade it more quickly then without intervention.     Cryotherapy instructions    Post op instructions given. A printed copy provided.    It is best to leave blisters alone if they form for the

## 2024-05-02 NOTE — RESULT ENCOUNTER NOTE
Notify patient the skin lesion pathology shows that the area is noncancerous.  However, it is not normal skin tissue.  Therefore it is good that it was removed.  No further treatment is necessary at this time.

## 2024-05-13 ENCOUNTER — HOSPITAL ENCOUNTER (OUTPATIENT)
Dept: WOMENS IMAGING | Age: 69
Discharge: HOME | End: 2024-05-15
Payer: MEDICARE

## 2024-05-13 DIAGNOSIS — Z78.0 POSTMENOPAUSAL: ICD-10-CM

## 2024-05-13 PROCEDURE — 77080 DXA BONE DENSITY AXIAL: CPT

## 2024-06-05 ENCOUNTER — OFFICE VISIT (OUTPATIENT)
Dept: FAMILY MEDICINE CLINIC | Age: 69
End: 2024-06-05
Payer: MEDICARE

## 2024-06-05 VITALS
HEIGHT: 65 IN | WEIGHT: 132.2 LBS | HEART RATE: 72 BPM | TEMPERATURE: 97.4 F | SYSTOLIC BLOOD PRESSURE: 142 MMHG | OXYGEN SATURATION: 99 % | BODY MASS INDEX: 22.02 KG/M2 | DIASTOLIC BLOOD PRESSURE: 80 MMHG

## 2024-06-05 DIAGNOSIS — K75.4 AUTOIMMUNE HEPATITIS (HCC): Primary | ICD-10-CM

## 2024-06-05 DIAGNOSIS — R03.0 ELEVATED BLOOD PRESSURE READING: ICD-10-CM

## 2024-06-05 PROCEDURE — 3017F COLORECTAL CA SCREEN DOC REV: CPT | Performed by: NURSE PRACTITIONER

## 2024-06-05 PROCEDURE — G8427 DOCREV CUR MEDS BY ELIG CLIN: HCPCS | Performed by: NURSE PRACTITIONER

## 2024-06-05 PROCEDURE — G8399 PT W/DXA RESULTS DOCUMENT: HCPCS | Performed by: NURSE PRACTITIONER

## 2024-06-05 PROCEDURE — 99213 OFFICE O/P EST LOW 20 MIN: CPT | Performed by: NURSE PRACTITIONER

## 2024-06-05 PROCEDURE — 1036F TOBACCO NON-USER: CPT | Performed by: NURSE PRACTITIONER

## 2024-06-05 PROCEDURE — 1090F PRES/ABSN URINE INCON ASSESS: CPT | Performed by: NURSE PRACTITIONER

## 2024-06-05 PROCEDURE — 1123F ACP DISCUSS/DSCN MKR DOCD: CPT | Performed by: NURSE PRACTITIONER

## 2024-06-05 PROCEDURE — G8420 CALC BMI NORM PARAMETERS: HCPCS | Performed by: NURSE PRACTITIONER

## 2024-06-05 RX ORDER — MYCOPHENOLATE MOFETIL 250 MG/1
750 CAPSULE ORAL 2 TIMES DAILY
COMMUNITY
Start: 2024-06-03

## 2024-06-05 ASSESSMENT — ENCOUNTER SYMPTOMS
DIARRHEA: 0
SHORTNESS OF BREATH: 0
COUGH: 0
CONSTIPATION: 0

## 2024-06-05 NOTE — PROGRESS NOTES
Subjective  Chief Complaint   Patient presents with    4 WEEK FOLLOW UP    Hypertension     No concerns       HPI    Follow up for elevated BP.      States that she has been checking and recording bp daily since being here last.  She has brought in these readings with her.   Overall they have been \"ok\" but up a little in the AM at times.   See medica for BP readings.  Denies any signs/symptoms of high bp    Currently tapering down on the prednisone due to autoimmune hepatitis.     Patient Active Problem List    Diagnosis Date Noted    Positive colorectal cancer screening using Cologuard test 01/10/2023    Seizure ([facility]) 12/29/2021     Past Medical History:   Diagnosis Date    Osteoarthritis     neck    Seizure ([facility]) 12/29/2021     Past Surgical History:   Procedure Laterality Date    COLONOSCOPY N/A 01/10/2023    COLONOSCOPY DIAGNOSTIC performed by Tasha Marshall MD at [facility] BREAST FINE NEEDLE ASPIRATION Bilateral      Family History   Problem Relation Age of Onset    Diabetes Father     Breast Cancer Neg Hx     Colon Cancer Neg Hx      Social History     Socioeconomic History    Marital status:      Spouse name: None    Number of children: None    Years of education: None    Highest education level: None   Tobacco Use    Smoking status: Never    Smokeless tobacco: Never   Vaping Use    Vaping Use: Never used   Substance and Sexual Activity    Alcohol use: No     Comment: occasionally    Drug use: No     Social Determinants of Health     Financial Resource Strain: Low Risk  (4/26/2024)    Overall Financial Resource Strain (CARDIA)     Difficulty of Paying Living Expenses: Not hard at all   Food Insecurity: No Food Insecurity (4/26/2024)    Hunger Vital Sign     Worried About Running Out of Food in the Last Year: Never true     Ran Out of Food in the Last Year: Never true   Transportation Needs: Unknown (4/26/2024)    PRAPARE - Transportation     Lack of Transportation (Non-Medical): No

## 2024-10-16 ENCOUNTER — PATIENT MESSAGE (OUTPATIENT)
Dept: FAMILY MEDICINE CLINIC | Age: 69
End: 2024-10-16

## 2024-10-16 DIAGNOSIS — Z12.31 BREAST CANCER SCREENING BY MAMMOGRAM: Primary | ICD-10-CM

## 2024-10-31 ENCOUNTER — HOSPITAL ENCOUNTER (OUTPATIENT)
Dept: WOMENS IMAGING | Age: 69
Discharge: HOME | End: 2024-11-02
Payer: MEDICARE

## 2024-10-31 DIAGNOSIS — Z12.31 BREAST CANCER SCREENING BY MAMMOGRAM: ICD-10-CM

## 2024-10-31 PROCEDURE — 77063 BREAST TOMOSYNTHESIS BI: CPT

## 2025-01-02 ENCOUNTER — OFFICE VISIT (OUTPATIENT)
Dept: URGENT CARE | Age: 70
End: 2025-01-02
Payer: COMMERCIAL

## 2025-01-02 VITALS
SYSTOLIC BLOOD PRESSURE: 161 MMHG | HEIGHT: 65 IN | HEART RATE: 80 BPM | RESPIRATION RATE: 20 BRPM | OXYGEN SATURATION: 99 % | DIASTOLIC BLOOD PRESSURE: 76 MMHG | WEIGHT: 125 LBS | BODY MASS INDEX: 20.83 KG/M2 | TEMPERATURE: 97.7 F

## 2025-01-02 DIAGNOSIS — J06.9 UPPER RESPIRATORY TRACT INFECTION, UNSPECIFIED TYPE: ICD-10-CM

## 2025-01-02 LAB — POC RAPID STREP: NEGATIVE

## 2025-01-02 RX ORDER — PREDNISONE 2.5 MG/1
2.5 TABLET ORAL DAILY
COMMUNITY

## 2025-01-02 RX ORDER — BROMPHENIRAMINE MALEATE, PSEUDOEPHEDRINE HYDROCHLORIDE, AND DEXTROMETHORPHAN HYDROBROMIDE 2; 30; 10 MG/5ML; MG/5ML; MG/5ML
5 SYRUP ORAL EVERY 4 HOURS PRN
Qty: 120 ML | Refills: 0 | Status: SHIPPED | OUTPATIENT
Start: 2025-01-02

## 2025-01-02 RX ORDER — MYCOPHENOLATE MOFETIL 500 MG/1
1 TABLET ORAL
COMMUNITY
Start: 2024-03-16

## 2025-01-02 RX ORDER — CELECOXIB 200 MG/1
200 CAPSULE ORAL DAILY
COMMUNITY

## 2025-01-02 RX ORDER — MYCOPHENOLATE MOFETIL 250 MG/1
750 CAPSULE ORAL 2 TIMES DAILY
COMMUNITY

## 2025-01-02 RX ORDER — PREDNISONE 10 MG/1
10 TABLET ORAL
Qty: 6 TABLET | Refills: 0 | Status: SHIPPED | OUTPATIENT
Start: 2025-01-02 | End: 2025-01-05

## 2025-01-02 RX ORDER — AZITHROMYCIN 250 MG/1
TABLET, FILM COATED ORAL
Qty: 6 TABLET | Refills: 0 | Status: SHIPPED | OUTPATIENT
Start: 2025-01-02

## 2025-01-02 NOTE — PROGRESS NOTES
"Subjective   Patient ID: Christal Posada is a 69 y.o. female who presents for Sore Throat, Nasal Congestion, and Cough (Sore throat, nasal congestion, cough x 1 week).  HPI  Patient presents for evaluation of sinus pressure. Patient reports 1 week of progressively worsening maxillary sinus pain, nasal congestion, and headache. There is reported mild postnasal drip. No fever, cough, or other constitutional signs and symptoms. Symptoms have been refractory to over-the-counter medications.    Review of Systems    Constitutional:  See HPI    ENT: See HPI  Respiratory: See HPI  Neurologic:  Alert and oriented X4, No numbness, No tingling.    All other systems are negative     Objective     /76 (BP Location: Left arm, Patient Position: Sitting)   Pulse 80   Temp 36.5 °C (97.7 °F)   Resp 20   Ht 1.651 m (5' 5\")   Wt 56.7 kg (125 lb)   SpO2 99%   BMI 20.80 kg/m²     Physical Exam    General:  Alert and oriented, No acute distress.    Eye:  Pupils are equal, round and reactive to light, Normal conjunctiva.    HENT:  Normocephalic, paranasal sinus pressure; nasal congestion noted; unremarkable oropharynx; no cervical adenopathy  Neck:  Supple    Respiratory: Respirations are non-labored   Musculoskeletal: Normal ROM and strength  Integumentary:  Warm, Dry, Intact, No pallor, No rash.    Neurologic:  Alert, Oriented, Normal sensory, Cranial Nerves II-XII are grossly intact  Psychiatric:  Cooperative, Appropriate mood & affect.    Assessment/Plan   Exam is consistent with upper respiratory infection.  Prescription for Z-Dane, low-dose prednisone, and Bromfed.  Patient's clinical presentation is otherwise unremarkable at this time. Patient is discharged with instructions to follow-up with primary care or seek emergency medical attention for worsening symptoms or any new concerns.  Problem List Items Addressed This Visit    None  Visit Diagnoses       Upper respiratory tract infection, unspecified type        " Relevant Medications    azithromycin (Zithromax) 250 mg tablet    predniSONE (Deltasone) 10 mg tablet    brompheniramine-pseudoeph-DM (Bromfed DM) 2-30-10 mg/5 mL syrup    Other Relevant Orders    POCT rapid strep A manually resulted (Completed)            Final diagnoses:   [J06.9] Upper respiratory tract infection, unspecified type

## 2025-02-03 ENCOUNTER — OFFICE VISIT (OUTPATIENT)
Dept: FAMILY MEDICINE CLINIC | Age: 70
End: 2025-02-03
Payer: MEDICARE

## 2025-02-03 VITALS
WEIGHT: 131.6 LBS | DIASTOLIC BLOOD PRESSURE: 88 MMHG | BODY MASS INDEX: 21.92 KG/M2 | HEART RATE: 67 BPM | SYSTOLIC BLOOD PRESSURE: 136 MMHG | HEIGHT: 65 IN | TEMPERATURE: 97.5 F | OXYGEN SATURATION: 99 %

## 2025-02-03 DIAGNOSIS — K75.4 AUTOIMMUNE HEPATITIS (HCC): Primary | ICD-10-CM

## 2025-02-03 DIAGNOSIS — H65.01 RIGHT ACUTE SEROUS OTITIS MEDIA, RECURRENCE NOT SPECIFIED: ICD-10-CM

## 2025-02-03 PROBLEM — R56.9 SEIZURE (HCC): Status: RESOLVED | Noted: 2021-12-29 | Resolved: 2025-02-03

## 2025-02-03 PROCEDURE — G8399 PT W/DXA RESULTS DOCUMENT: HCPCS | Performed by: NURSE PRACTITIONER

## 2025-02-03 PROCEDURE — 1159F MED LIST DOCD IN RCRD: CPT | Performed by: NURSE PRACTITIONER

## 2025-02-03 PROCEDURE — 1123F ACP DISCUSS/DSCN MKR DOCD: CPT | Performed by: NURSE PRACTITIONER

## 2025-02-03 PROCEDURE — G8420 CALC BMI NORM PARAMETERS: HCPCS | Performed by: NURSE PRACTITIONER

## 2025-02-03 PROCEDURE — 1036F TOBACCO NON-USER: CPT | Performed by: NURSE PRACTITIONER

## 2025-02-03 PROCEDURE — 99213 OFFICE O/P EST LOW 20 MIN: CPT | Performed by: NURSE PRACTITIONER

## 2025-02-03 PROCEDURE — 1125F AMNT PAIN NOTED PAIN PRSNT: CPT | Performed by: NURSE PRACTITIONER

## 2025-02-03 PROCEDURE — 1160F RVW MEDS BY RX/DR IN RCRD: CPT | Performed by: NURSE PRACTITIONER

## 2025-02-03 PROCEDURE — G8427 DOCREV CUR MEDS BY ELIG CLIN: HCPCS | Performed by: NURSE PRACTITIONER

## 2025-02-03 PROCEDURE — 1090F PRES/ABSN URINE INCON ASSESS: CPT | Performed by: NURSE PRACTITIONER

## 2025-02-03 PROCEDURE — 3017F COLORECTAL CA SCREEN DOC REV: CPT | Performed by: NURSE PRACTITIONER

## 2025-02-03 RX ORDER — PREDNISONE 2.5 MG/1
2.5 TABLET ORAL DAILY
COMMUNITY
Start: 2025-01-05

## 2025-02-03 SDOH — ECONOMIC STABILITY: FOOD INSECURITY: WITHIN THE PAST 12 MONTHS, THE FOOD YOU BOUGHT JUST DIDN'T LAST AND YOU DIDN'T HAVE MONEY TO GET MORE.: NEVER TRUE

## 2025-02-03 SDOH — ECONOMIC STABILITY: FOOD INSECURITY: WITHIN THE PAST 12 MONTHS, YOU WORRIED THAT YOUR FOOD WOULD RUN OUT BEFORE YOU GOT MONEY TO BUY MORE.: NEVER TRUE

## 2025-02-03 ASSESSMENT — ENCOUNTER SYMPTOMS
SINUS PAIN: 0
COUGH: 0
SINUS PRESSURE: 0
SHORTNESS OF BREATH: 0

## 2025-02-03 NOTE — PROGRESS NOTES
Subjective  Chief Complaint   Patient presents with    Ear Pain     RT ear pain, x 2/3 weeks       HPI    History of Present Illness  The patient is a 69-year-old female presenting today with right ear pain and fullness that has been lasting for a couple of weeks, preceded by a cold over [place]. She complains of right sinus pain and pressure, right ear fullness, and pain and pressure in her right neck. No positive swabs for anything. No fevers, no cough, no sputum, no shortness of breath. She has been taking Celebrex for her arthritis pain with minimal effect. No other medications.    She reports experiencing intermittent right ear pain, which she initially believed was resolving but has since recurred with increased severity. She recalls an episode of bilateral ear plugging upon awakening one morning, resulting in temporary hearing loss. She also notes that her right eye appears puffy in the mornings. She has previously attempted to manage her symptoms with over-the-counter decongestants but found them ineffective. She has not experienced any adverse reactions to these medications. She has a history of using prednisone 10 mg twice daily for 3 days, along with a 5-day course of antibiotics and cough syrup, which successfully resolved her cold symptoms.    She has been taking Celebrex quite often lately because this time of year is really bad for her arthritis. She is wondering if there is anything other than the Celebrex that has come off that is newer and might be better with the other medicines than that.    She has never had any seizures. When she had COVID-19, her  thought she was having a seizure, so they went to the hospital. The hospital staff also thought she was having a seizure. However, when she went to the neurologist, they said she was not having a seizure. The neurologist ran tests that the hospital did not do and determined that her symptoms were just side effects from dehydration and

## 2025-04-27 SDOH — HEALTH STABILITY: PHYSICAL HEALTH: ON AVERAGE, HOW MANY MINUTES DO YOU ENGAGE IN EXERCISE AT THIS LEVEL?: 30 MIN

## 2025-04-27 SDOH — HEALTH STABILITY: PHYSICAL HEALTH: ON AVERAGE, HOW MANY DAYS PER WEEK DO YOU ENGAGE IN MODERATE TO STRENUOUS EXERCISE (LIKE A BRISK WALK)?: 3 DAYS

## 2025-04-27 ASSESSMENT — PATIENT HEALTH QUESTIONNAIRE - PHQ9
SUM OF ALL RESPONSES TO PHQ QUESTIONS 1-9: 0
SUM OF ALL RESPONSES TO PHQ QUESTIONS 1-9: 0
1. LITTLE INTEREST OR PLEASURE IN DOING THINGS: NOT AT ALL
2. FEELING DOWN, DEPRESSED OR HOPELESS: NOT AT ALL
SUM OF ALL RESPONSES TO PHQ QUESTIONS 1-9: 0
SUM OF ALL RESPONSES TO PHQ QUESTIONS 1-9: 0

## 2025-04-27 ASSESSMENT — LIFESTYLE VARIABLES
HOW MANY STANDARD DRINKS CONTAINING ALCOHOL DO YOU HAVE ON A TYPICAL DAY: 0
HOW MANY STANDARD DRINKS CONTAINING ALCOHOL DO YOU HAVE ON A TYPICAL DAY: PATIENT DOES NOT DRINK
HOW OFTEN DO YOU HAVE A DRINK CONTAINING ALCOHOL: NEVER
HOW OFTEN DO YOU HAVE A DRINK CONTAINING ALCOHOL: 1
HOW OFTEN DO YOU HAVE SIX OR MORE DRINKS ON ONE OCCASION: 1

## 2025-04-30 ENCOUNTER — OFFICE VISIT (OUTPATIENT)
Dept: FAMILY MEDICINE CLINIC | Age: 70
End: 2025-04-30
Payer: MEDICARE

## 2025-04-30 VITALS
HEART RATE: 68 BPM | HEIGHT: 65 IN | TEMPERATURE: 97.2 F | DIASTOLIC BLOOD PRESSURE: 86 MMHG | OXYGEN SATURATION: 100 % | BODY MASS INDEX: 21.46 KG/M2 | SYSTOLIC BLOOD PRESSURE: 136 MMHG | WEIGHT: 128.8 LBS

## 2025-04-30 DIAGNOSIS — G89.29 CHRONIC NONINTRACTABLE HEADACHE, UNSPECIFIED HEADACHE TYPE: ICD-10-CM

## 2025-04-30 DIAGNOSIS — Z00.00 MEDICARE ANNUAL WELLNESS VISIT, SUBSEQUENT: Primary | ICD-10-CM

## 2025-04-30 DIAGNOSIS — R51.9 CHRONIC NONINTRACTABLE HEADACHE, UNSPECIFIED HEADACHE TYPE: ICD-10-CM

## 2025-04-30 PROCEDURE — G0439 PPPS, SUBSEQ VISIT: HCPCS | Performed by: NURSE PRACTITIONER

## 2025-04-30 PROCEDURE — 3017F COLORECTAL CA SCREEN DOC REV: CPT | Performed by: NURSE PRACTITIONER

## 2025-04-30 PROCEDURE — 1123F ACP DISCUSS/DSCN MKR DOCD: CPT | Performed by: NURSE PRACTITIONER

## 2025-04-30 PROCEDURE — 1159F MED LIST DOCD IN RCRD: CPT | Performed by: NURSE PRACTITIONER

## 2025-04-30 PROCEDURE — 1125F AMNT PAIN NOTED PAIN PRSNT: CPT | Performed by: NURSE PRACTITIONER

## 2025-04-30 PROCEDURE — 1160F RVW MEDS BY RX/DR IN RCRD: CPT | Performed by: NURSE PRACTITIONER

## 2025-04-30 RX ORDER — CELECOXIB 200 MG/1
200 CAPSULE ORAL DAILY
Qty: 30 CAPSULE | Refills: 5 | Status: SHIPPED | OUTPATIENT
Start: 2025-04-30

## 2025-04-30 RX ORDER — LEVOCETIRIZINE DIHYDROCHLORIDE 5 MG/1
5 TABLET, FILM COATED ORAL DAILY PRN
COMMUNITY

## 2025-04-30 NOTE — PROGRESS NOTES
Medicare Annual Wellness Visit    Emili Faulkner is here for Medicare AWV (No Concerns) and Medication Refill (Refill, Pended)    Assessment & Plan  1. Elevated BUN.  - BUN levels have shown a slight increase, indicating potential dehydration.  - Other kidney markers are within normal limits; liver function tests are satisfactory.  - Discussion on the potential impact of Celebrex on renal function; advised to continue its use as needed.  - Advised to augment fluid intake to address the elevated BUN levels.    2. Allergies.  - Reports experiencing allergy symptoms, including a raspy voice and potential cold symptoms.  - Physical exam findings indicate lungs sound good.  - Advised to maintain adequate hydration, ensure sufficient rest, and consider increasing intake of vitamin C and zinc.  - If symptoms persist, she should inform the clinic.    3. Medication Management.  - Currently taking Celebrex as needed, especially during cold weather or strenuous activities.  - Discussion on the effectiveness of Celebrex and its impact on kidney function.  - Advised to continue this regimen and ensure she has enough refills available at [facility].  - Reviewed prescription drug monitoring program.    Follow-up  - Recommended follow-up in 6 months to monitor ongoing conditions.  Medicare annual wellness visit, subsequent  Chronic nonintractable headache, unspecified headache type  -     celecoxib (CELEBREX) 200 MG capsule; Take 1 capsule by mouth daily, Disp-30 capsule, R-5Normal    Results  Labs   - BUN: Slightly elevated   - Bilirubin: Normal     No follow-ups on file.     Subjective     History of Present Illness  The patient is a 69-year-old female who presents for evaluation of elevated BUN, allergies, and medication management.    No significant changes in health status have been reported since the last visit. Weight loss is noted by the scale, although clothing fit remains unchanged.     Overall, she feels well and has

## 2025-05-01 ENCOUNTER — OFFICE VISIT (OUTPATIENT)
Dept: FAMILY MEDICINE CLINIC | Age: 70
End: 2025-05-01
Payer: MEDICARE

## 2025-05-01 VITALS — BODY MASS INDEX: 21.56 KG/M2 | TEMPERATURE: 97.5 F | HEIGHT: 65 IN | WEIGHT: 129.4 LBS

## 2025-05-01 DIAGNOSIS — L84 CALLUS OF HAND: ICD-10-CM

## 2025-05-01 DIAGNOSIS — L82.1 SEBORRHEIC KERATOSES: ICD-10-CM

## 2025-05-01 DIAGNOSIS — Z87.2 H/O ACTINIC KERATOSIS: ICD-10-CM

## 2025-05-01 DIAGNOSIS — Z12.83 SKIN CANCER SCREENING: Primary | ICD-10-CM

## 2025-05-01 PROCEDURE — 1126F AMNT PAIN NOTED NONE PRSNT: CPT | Performed by: FAMILY MEDICINE

## 2025-05-01 PROCEDURE — 1160F RVW MEDS BY RX/DR IN RCRD: CPT | Performed by: FAMILY MEDICINE

## 2025-05-01 PROCEDURE — G8427 DOCREV CUR MEDS BY ELIG CLIN: HCPCS | Performed by: FAMILY MEDICINE

## 2025-05-01 PROCEDURE — 1036F TOBACCO NON-USER: CPT | Performed by: FAMILY MEDICINE

## 2025-05-01 PROCEDURE — G8399 PT W/DXA RESULTS DOCUMENT: HCPCS | Performed by: FAMILY MEDICINE

## 2025-05-01 PROCEDURE — 1123F ACP DISCUSS/DSCN MKR DOCD: CPT | Performed by: FAMILY MEDICINE

## 2025-05-01 PROCEDURE — 99213 OFFICE O/P EST LOW 20 MIN: CPT | Performed by: FAMILY MEDICINE

## 2025-05-01 PROCEDURE — 1159F MED LIST DOCD IN RCRD: CPT | Performed by: FAMILY MEDICINE

## 2025-05-01 PROCEDURE — 1090F PRES/ABSN URINE INCON ASSESS: CPT | Performed by: FAMILY MEDICINE

## 2025-05-01 PROCEDURE — G8420 CALC BMI NORM PARAMETERS: HCPCS | Performed by: FAMILY MEDICINE

## 2025-05-01 PROCEDURE — 3017F COLORECTAL CA SCREEN DOC REV: CPT | Performed by: FAMILY MEDICINE

## 2025-05-01 NOTE — PROGRESS NOTES
Diagnosis Orders   1. Skin cancer screening        2. Seborrheic keratoses        3. Callus of hand        4. H/O actinic keratosis            See patient instructions for further assessment/plan specifics    Return in about 1 year (around 5/1/2026) for 15 min skin check.  Patient Instructions   Discussed benign nature of lesions noted.  No concerns today.        Subjective:      Patient ID: Emili Faulkner is a 69 y.o. female who presents for:  Chief Complaint   Patient presents with    Skin Exam     1yr skin check, no concerns. 1 spot noted last year.       HPI  Patient here for routine skin exam history of actinic keratosis in the past.    Has some calluses on her hand she has tried over-the-counter remover for and they do come off but they come right back.  She is left-handed and they keep reoccurring on the left hand.        Current Outpatient Medications on File Prior to Visit   Medication Sig Dispense Refill    levocetirizine (XYZAL) 5 MG tablet Take 1 tablet by mouth daily as needed      celecoxib (CELEBREX) 200 MG capsule Take 1 capsule by mouth daily 30 capsule 5    predniSONE (DELTASONE) 2.5 MG tablet Take 1 tablet by mouth daily      mycophenolate (CELLCEPT) 250 MG capsule Take 3 capsules by mouth 2 times daily       No current facility-administered medications on file prior to visit.     Past Medical History:   Diagnosis Date    Osteoarthritis     neck    Seizure (HCC) 12/29/2021     Past Surgical History:   Procedure Laterality Date    COLONOSCOPY N/A 01/10/2023    COLONOSCOPY DIAGNOSTIC performed by Tasha Marshall MD at [facility] BREAST FINE NEEDLE ASPIRATION Bilateral     multiple cyst aspirations 1980,s     Social History     Socioeconomic History    Marital status:      Spouse name: Not on file    Number of children: Not on file    Years of education: Not on file    Highest education level: Not on file   Occupational History    Not on file   Tobacco Use    Smoking status:

## (undated) DEVICE — BRUSH ENDO COMBO

## (undated) DEVICE — Device: Brand: ENDO SMARTCAP

## (undated) DEVICE — TUBING, SUCTION, 1/4" X 10', STRAIGHT: Brand: MEDLINE

## (undated) DEVICE — TUBE SET 96 MM 64 MM H2O PERISTALTIC STD AUX CHANNEL

## (undated) DEVICE — SINGLE PORT MANIFOLD: Brand: NEPTUNE 2

## (undated) DEVICE — ENDO CARRY-ON PROCEDURE KIT: Brand: ENDO CARRY-ON PROCEDURE KIT